# Patient Record
Sex: FEMALE | Race: WHITE | HISPANIC OR LATINO | Employment: FULL TIME | URBAN - METROPOLITAN AREA
[De-identification: names, ages, dates, MRNs, and addresses within clinical notes are randomized per-mention and may not be internally consistent; named-entity substitution may affect disease eponyms.]

---

## 2017-01-04 ENCOUNTER — AMBULATORY - HEALTHEAST (OUTPATIENT)
Dept: SURGERY | Facility: CLINIC | Age: 52
End: 2017-01-04

## 2017-01-13 ENCOUNTER — AMBULATORY - HEALTHEAST (OUTPATIENT)
Dept: SURGERY | Facility: CLINIC | Age: 52
End: 2017-01-13

## 2017-01-13 ENCOUNTER — OFFICE VISIT - HEALTHEAST (OUTPATIENT)
Dept: SURGERY | Facility: CLINIC | Age: 52
End: 2017-01-13

## 2017-01-13 DIAGNOSIS — E66.01 MORBID OBESITY (H): ICD-10-CM

## 2017-01-13 DIAGNOSIS — E78.5 HYPERLIPEMIA: ICD-10-CM

## 2017-01-13 DIAGNOSIS — I10 HYPERTENSION: ICD-10-CM

## 2017-01-13 ASSESSMENT — MIFFLIN-ST. JEOR: SCORE: 1732.03

## 2017-02-16 ENCOUNTER — AMBULATORY - HEALTHEAST (OUTPATIENT)
Dept: SURGERY | Facility: CLINIC | Age: 52
End: 2017-02-16

## 2017-02-23 ENCOUNTER — HOSPITAL ENCOUNTER (OUTPATIENT)
Dept: CARDIOLOGY | Facility: CLINIC | Age: 52
Discharge: HOME | End: 2017-02-23
Attending: FAMILY MEDICINE

## 2017-02-23 ENCOUNTER — AMBULATORY - HEALTHEAST (OUTPATIENT)
Dept: SURGERY | Facility: CLINIC | Age: 52
End: 2017-02-23

## 2017-02-23 ENCOUNTER — HOSPITAL ENCOUNTER (OUTPATIENT)
Dept: RADIOLOGY | Facility: CLINIC | Age: 52
Discharge: HOME | End: 2017-02-23
Attending: FAMILY MEDICINE

## 2017-02-23 DIAGNOSIS — Z01.818 PRE-OPERATIVE CLEARANCE: ICD-10-CM

## 2017-02-23 DIAGNOSIS — Z98.84 BARIATRIC SURGERY STATUS: ICD-10-CM

## 2017-02-23 DIAGNOSIS — E11.9 DIABETES (H): ICD-10-CM

## 2017-02-23 DIAGNOSIS — Z98.84 S/P LAPAROSCOPIC SLEEVE GASTRECTOMY: ICD-10-CM

## 2017-02-23 DIAGNOSIS — Z71.3 DIETARY COUNSELING: ICD-10-CM

## 2017-02-23 DIAGNOSIS — R63.4 RAPID WEIGHT LOSS: ICD-10-CM

## 2017-02-23 DIAGNOSIS — Z98.84 HISTORY OF ROUX-EN-Y GASTRIC BYPASS: ICD-10-CM

## 2017-02-23 DIAGNOSIS — K90.9 INTESTINAL MALABSORPTION: ICD-10-CM

## 2017-02-23 DIAGNOSIS — E66.9 OBESITY (BMI 30-39.9): ICD-10-CM

## 2017-02-23 ASSESSMENT — MIFFLIN-ST. JEOR: SCORE: 1727.5

## 2017-02-24 LAB
ATRIAL RATE - MUSE: 55 BPM
DIASTOLIC BLOOD PRESSURE - MUSE: NORMAL MMHG
INTERPRETATION ECG - MUSE: NORMAL
P AXIS - MUSE: 29 DEGREES
PR INTERVAL - MUSE: 152 MS
QRS DURATION - MUSE: 86 MS
QT - MUSE: 416 MS
QTC - MUSE: 397 MS
R AXIS - MUSE: 18 DEGREES
SYSTOLIC BLOOD PRESSURE - MUSE: NORMAL MMHG
T AXIS - MUSE: 16 DEGREES
VENTRICULAR RATE- MUSE: 55 BPM

## 2017-02-27 ENCOUNTER — OFFICE VISIT - HEALTHEAST (OUTPATIENT)
Dept: FAMILY MEDICINE | Facility: CLINIC | Age: 52
End: 2017-02-27

## 2017-02-27 DIAGNOSIS — H40.9 GLAUCOMA: ICD-10-CM

## 2017-02-27 DIAGNOSIS — E11.9 DIABETES (H): ICD-10-CM

## 2017-02-27 DIAGNOSIS — Z01.818 PREOP EXAMINATION: ICD-10-CM

## 2017-02-27 DIAGNOSIS — E55.9 VITAMIN D DEFICIENCY: ICD-10-CM

## 2017-02-27 DIAGNOSIS — J31.0 RHINITIS: ICD-10-CM

## 2017-02-27 DIAGNOSIS — E04.0 SIMPLE GOITER: ICD-10-CM

## 2017-02-27 DIAGNOSIS — E66.9 OBESITY: ICD-10-CM

## 2017-02-27 DIAGNOSIS — E78.5 HYPERLIPIDEMIA: ICD-10-CM

## 2017-02-27 DIAGNOSIS — K76.0 NAFLD (NONALCOHOLIC FATTY LIVER DISEASE): ICD-10-CM

## 2017-02-27 DIAGNOSIS — I10 ESSENTIAL HYPERTENSION: ICD-10-CM

## 2017-02-27 DIAGNOSIS — E11.9 TYPE 2 DIABETES MELLITUS (H): ICD-10-CM

## 2017-02-27 DIAGNOSIS — Z01.818 PRE-OPERATIVE CLEARANCE: ICD-10-CM

## 2017-02-27 LAB
CHOLEST SERPL-MCNC: 191 MG/DL
FASTING STATUS PATIENT QL REPORTED: ABNORMAL
HBA1C MFR BLD: 6.6 % (ref 3.5–6)
HDLC SERPL-MCNC: 56 MG/DL
LDLC SERPL CALC-MCNC: 99 MG/DL
TRIGL SERPL-MCNC: 182 MG/DL

## 2017-02-27 ASSESSMENT — MIFFLIN-ST. JEOR: SCORE: 1739.41

## 2017-02-28 ENCOUNTER — COMMUNICATION - HEALTHEAST (OUTPATIENT)
Dept: FAMILY MEDICINE | Facility: CLINIC | Age: 52
End: 2017-02-28

## 2017-03-12 ENCOUNTER — ANESTHESIA - HEALTHEAST (OUTPATIENT)
Dept: SURGERY | Facility: CLINIC | Age: 52
End: 2017-03-12

## 2017-03-13 ENCOUNTER — SURGERY - HEALTHEAST (OUTPATIENT)
Dept: SURGERY | Facility: CLINIC | Age: 52
End: 2017-03-13

## 2017-03-13 ASSESSMENT — MIFFLIN-ST. JEOR: SCORE: 1672.78

## 2017-03-16 ENCOUNTER — COMMUNICATION - HEALTHEAST (OUTPATIENT)
Dept: SURGERY | Facility: CLINIC | Age: 52
End: 2017-03-16

## 2017-03-21 ENCOUNTER — AMBULATORY - HEALTHEAST (OUTPATIENT)
Dept: SURGERY | Facility: CLINIC | Age: 52
End: 2017-03-21

## 2017-03-21 DIAGNOSIS — Z98.84 BARIATRIC SURGERY STATUS: ICD-10-CM

## 2017-03-21 DIAGNOSIS — Z71.3 DIETARY COUNSELING: ICD-10-CM

## 2017-03-21 DIAGNOSIS — E66.9 OBESITY (BMI 30-39.9): ICD-10-CM

## 2017-03-24 ENCOUNTER — COMMUNICATION - HEALTHEAST (OUTPATIENT)
Dept: INTERNAL MEDICINE | Facility: CLINIC | Age: 52
End: 2017-03-24

## 2017-03-27 ENCOUNTER — COMMUNICATION - HEALTHEAST (OUTPATIENT)
Dept: SURGERY | Facility: CLINIC | Age: 52
End: 2017-03-27

## 2017-03-29 ENCOUNTER — OFFICE VISIT - HEALTHEAST (OUTPATIENT)
Dept: SURGERY | Facility: CLINIC | Age: 52
End: 2017-03-29

## 2017-03-29 DIAGNOSIS — Z48.89 POSTOPERATIVE VISIT: ICD-10-CM

## 2017-03-29 DIAGNOSIS — E11.9 DIABETES (H): ICD-10-CM

## 2017-03-29 ASSESSMENT — MIFFLIN-ST. JEOR: SCORE: 1644.15

## 2017-05-05 ENCOUNTER — COMMUNICATION - HEALTHEAST (OUTPATIENT)
Dept: SURGERY | Facility: CLINIC | Age: 52
End: 2017-05-05

## 2017-06-08 ENCOUNTER — OFFICE VISIT - HEALTHEAST (OUTPATIENT)
Dept: FAMILY MEDICINE | Facility: CLINIC | Age: 52
End: 2017-06-08

## 2017-06-08 DIAGNOSIS — R42 LIGHTHEADED: ICD-10-CM

## 2017-06-08 DIAGNOSIS — I10 ESSENTIAL HYPERTENSION WITH GOAL BLOOD PRESSURE LESS THAN 130/80: ICD-10-CM

## 2017-06-08 DIAGNOSIS — R73.03 PREDIABETES: ICD-10-CM

## 2017-06-08 LAB — HBA1C MFR BLD: 5.5 % (ref 3.5–6)

## 2017-06-08 ASSESSMENT — MIFFLIN-ST. JEOR: SCORE: 1540.73

## 2017-06-12 ENCOUNTER — COMMUNICATION - HEALTHEAST (OUTPATIENT)
Dept: FAMILY MEDICINE | Facility: CLINIC | Age: 52
End: 2017-06-12

## 2017-06-19 ENCOUNTER — OFFICE VISIT - HEALTHEAST (OUTPATIENT)
Dept: SURGERY | Facility: CLINIC | Age: 52
End: 2017-06-19

## 2017-06-19 DIAGNOSIS — Z98.84 BARIATRIC SURGERY STATUS: ICD-10-CM

## 2017-06-19 DIAGNOSIS — Z71.3 DIETARY COUNSELING: ICD-10-CM

## 2017-06-19 DIAGNOSIS — E66.9 OBESITY (BMI 30-39.9): ICD-10-CM

## 2017-06-19 ASSESSMENT — MIFFLIN-ST. JEOR: SCORE: 1535.29

## 2017-06-22 ENCOUNTER — OFFICE VISIT - HEALTHEAST (OUTPATIENT)
Dept: FAMILY MEDICINE | Facility: CLINIC | Age: 52
End: 2017-06-22

## 2017-06-22 DIAGNOSIS — I10 ESSENTIAL HYPERTENSION WITH GOAL BLOOD PRESSURE LESS THAN 130/80: ICD-10-CM

## 2017-06-22 DIAGNOSIS — T75.3XXA MOTION SICKNESS, INITIAL ENCOUNTER: ICD-10-CM

## 2017-06-22 RX ORDER — CETIRIZINE HYDROCHLORIDE 5 MG/1
5 TABLET ORAL DAILY
Status: SHIPPED | COMMUNITY
Start: 2017-06-22

## 2017-06-22 ASSESSMENT — MIFFLIN-ST. JEOR: SCORE: 1515.78

## 2017-07-10 ENCOUNTER — OFFICE VISIT - HEALTHEAST (OUTPATIENT)
Dept: SURGERY | Facility: CLINIC | Age: 52
End: 2017-07-10

## 2017-07-10 DIAGNOSIS — E66.01 MORBID OBESITY (H): ICD-10-CM

## 2017-08-22 ENCOUNTER — COMMUNICATION - HEALTHEAST (OUTPATIENT)
Dept: FAMILY MEDICINE | Facility: CLINIC | Age: 52
End: 2017-08-22

## 2017-08-22 DIAGNOSIS — T75.3XXA MOTION SICKNESS, INITIAL ENCOUNTER: ICD-10-CM

## 2017-08-24 ENCOUNTER — RECORDS - HEALTHEAST (OUTPATIENT)
Dept: ADMINISTRATIVE | Facility: OTHER | Age: 52
End: 2017-08-24

## 2017-09-06 ENCOUNTER — AMBULATORY - HEALTHEAST (OUTPATIENT)
Dept: SURGERY | Facility: CLINIC | Age: 52
End: 2017-09-06

## 2017-09-06 DIAGNOSIS — K90.9 UNSPECIFIED INTESTINAL MALABSORPTION: ICD-10-CM

## 2017-09-06 DIAGNOSIS — Z98.84 S/P BARIATRIC SURGERY: ICD-10-CM

## 2017-09-06 DIAGNOSIS — K91.2 OTHER AND UNSPECIFIED POSTSURGICAL NONABSORPTION: ICD-10-CM

## 2017-09-11 ENCOUNTER — HOSPITAL ENCOUNTER (OUTPATIENT)
Dept: MAMMOGRAPHY | Facility: CLINIC | Age: 52
Discharge: HOME | End: 2017-09-11
Attending: OBSTETRICS & GYNECOLOGY

## 2017-09-11 DIAGNOSIS — Z12.31 VISIT FOR SCREENING MAMMOGRAM: ICD-10-CM

## 2017-09-15 ENCOUNTER — COMMUNICATION - HEALTHEAST (OUTPATIENT)
Dept: SURGERY | Facility: CLINIC | Age: 52
End: 2017-09-15

## 2017-09-18 ENCOUNTER — OFFICE VISIT - HEALTHEAST (OUTPATIENT)
Dept: FAMILY MEDICINE | Facility: CLINIC | Age: 52
End: 2017-09-18

## 2017-09-18 DIAGNOSIS — Z01.818 PREOP EXAMINATION: ICD-10-CM

## 2017-09-18 DIAGNOSIS — I34.0 MITRAL REGURGITATION: ICD-10-CM

## 2017-09-18 RX ORDER — CLINDAMYCIN HCL 300 MG
CAPSULE ORAL
Refills: 0 | Status: SHIPPED | COMMUNITY
Start: 2017-07-12 | End: 2021-12-16

## 2017-09-18 ASSESSMENT — MIFFLIN-ST. JEOR: SCORE: 1463.84

## 2017-09-19 ENCOUNTER — COMMUNICATION - HEALTHEAST (OUTPATIENT)
Dept: FAMILY MEDICINE | Facility: CLINIC | Age: 52
End: 2017-09-19

## 2017-09-22 ENCOUNTER — RECORDS - HEALTHEAST (OUTPATIENT)
Dept: ADMINISTRATIVE | Facility: OTHER | Age: 52
End: 2017-09-22

## 2017-09-22 LAB
LAB AP CHARGES (HE HISTORICAL CONVERSION): NORMAL
PATH REPORT.COMMENTS IMP SPEC: NORMAL
PATH REPORT.COMMENTS IMP SPEC: NORMAL
PATH REPORT.FINAL DX SPEC: NORMAL
PATH REPORT.GROSS SPEC: NORMAL
PATH REPORT.MICROSCOPIC SPEC OTHER STN: NORMAL
PATH REPORT.MICROSCOPIC SPEC OTHER STN: NORMAL
PATH REPORT.RELEVANT HX SPEC: NORMAL
RESULT FLAG (HE HISTORICAL CONVERSION): NORMAL

## 2017-10-04 ENCOUNTER — OFFICE VISIT - HEALTHEAST (OUTPATIENT)
Dept: INTERNAL MEDICINE | Facility: CLINIC | Age: 52
End: 2017-10-04

## 2017-10-04 DIAGNOSIS — R30.0 DYSURIA: ICD-10-CM

## 2017-10-06 ENCOUNTER — COMMUNICATION - HEALTHEAST (OUTPATIENT)
Dept: INTERNAL MEDICINE | Facility: CLINIC | Age: 52
End: 2017-10-06

## 2017-10-17 ENCOUNTER — OFFICE VISIT - HEALTHEAST (OUTPATIENT)
Dept: INTERNAL MEDICINE | Facility: CLINIC | Age: 52
End: 2017-10-17

## 2017-10-17 DIAGNOSIS — Z98.84 S/P BARIATRIC SURGERY: ICD-10-CM

## 2017-10-17 DIAGNOSIS — E78.5 HYPERLIPIDEMIA: ICD-10-CM

## 2017-10-17 DIAGNOSIS — R20.0 NUMBNESS AND TINGLING IN RIGHT HAND: ICD-10-CM

## 2017-10-17 DIAGNOSIS — K76.0 NAFLD (NONALCOHOLIC FATTY LIVER DISEASE): ICD-10-CM

## 2017-10-17 DIAGNOSIS — R20.2 NUMBNESS AND TINGLING IN RIGHT HAND: ICD-10-CM

## 2017-10-17 DIAGNOSIS — Z76.89 ENCOUNTER TO ESTABLISH CARE WITH NEW DOCTOR: ICD-10-CM

## 2017-10-17 DIAGNOSIS — K91.2 OTHER AND UNSPECIFIED POSTSURGICAL NONABSORPTION: ICD-10-CM

## 2017-10-17 DIAGNOSIS — E11.9 TYPE 2 DIABETES MELLITUS (H): ICD-10-CM

## 2017-10-17 DIAGNOSIS — E55.9 VITAMIN D DEFICIENCY: ICD-10-CM

## 2017-10-17 DIAGNOSIS — F43.21 GRIEF REACTION: ICD-10-CM

## 2017-10-17 DIAGNOSIS — Z98.890 S/P GASTRIC SURGERY: ICD-10-CM

## 2017-10-17 DIAGNOSIS — I10 ESSENTIAL HYPERTENSION: ICD-10-CM

## 2017-10-17 DIAGNOSIS — K90.9 INTESTINAL MALABSORPTION: ICD-10-CM

## 2017-10-17 LAB
CHOLEST SERPL-MCNC: 230 MG/DL
FASTING STATUS PATIENT QL REPORTED: ABNORMAL
HBA1C MFR BLD: 5.5 % (ref 3.5–6)
HDLC SERPL-MCNC: 59 MG/DL
LDLC SERPL CALC-MCNC: 135 MG/DL
TRIGL SERPL-MCNC: 181 MG/DL

## 2017-10-17 ASSESSMENT — MIFFLIN-ST. JEOR: SCORE: 1468.09

## 2017-10-23 ENCOUNTER — RECORDS - HEALTHEAST (OUTPATIENT)
Dept: ADMINISTRATIVE | Facility: OTHER | Age: 52
End: 2017-10-23

## 2017-10-24 ENCOUNTER — OFFICE VISIT - HEALTHEAST (OUTPATIENT)
Dept: SURGERY | Facility: CLINIC | Age: 52
End: 2017-10-24

## 2017-10-24 DIAGNOSIS — E66.3 OVERWEIGHT: ICD-10-CM

## 2017-10-24 DIAGNOSIS — K91.2 POSTOPERATIVE MALABSORPTION: ICD-10-CM

## 2017-10-24 RX ORDER — LATANOPROST 50 UG/ML
1 SOLUTION/ DROPS OPHTHALMIC AT BEDTIME
Refills: 11 | Status: SHIPPED | COMMUNITY
Start: 2017-10-11 | End: 2021-12-16

## 2017-10-24 ASSESSMENT — MIFFLIN-ST. JEOR: SCORE: 1471.21

## 2017-11-30 ENCOUNTER — COMMUNICATION - HEALTHEAST (OUTPATIENT)
Dept: INTERNAL MEDICINE | Facility: CLINIC | Age: 52
End: 2017-11-30

## 2017-11-30 DIAGNOSIS — J30.9 ALLERGIC RHINITIS: ICD-10-CM

## 2017-12-29 ENCOUNTER — COMMUNICATION - HEALTHEAST (OUTPATIENT)
Dept: INTERNAL MEDICINE | Facility: CLINIC | Age: 52
End: 2017-12-29

## 2017-12-29 DIAGNOSIS — J30.9 ALLERGIC RHINITIS: ICD-10-CM

## 2018-03-16 ENCOUNTER — OFFICE VISIT - HEALTHEAST (OUTPATIENT)
Dept: SURGERY | Facility: CLINIC | Age: 53
End: 2018-03-16

## 2018-03-16 DIAGNOSIS — Z98.84 BARIATRIC SURGERY STATUS: ICD-10-CM

## 2018-03-16 DIAGNOSIS — Z71.3 NUTRITIONAL COUNSELING: ICD-10-CM

## 2018-03-16 DIAGNOSIS — E66.3 OVERWEIGHT (BMI 25.0-29.9): ICD-10-CM

## 2018-03-16 ASSESSMENT — MIFFLIN-ST. JEOR: SCORE: 1467.25

## 2018-04-30 ENCOUNTER — COMMUNICATION - HEALTHEAST (OUTPATIENT)
Dept: INTERNAL MEDICINE | Facility: CLINIC | Age: 53
End: 2018-04-30

## 2018-04-30 DIAGNOSIS — J30.9 ALLERGIC RHINITIS: ICD-10-CM

## 2018-06-04 ENCOUNTER — AMBULATORY - HEALTHEAST (OUTPATIENT)
Dept: SURGERY | Facility: CLINIC | Age: 53
End: 2018-06-04

## 2018-06-04 DIAGNOSIS — K90.9 INTESTINAL MALABSORPTION, UNSPECIFIED TYPE: ICD-10-CM

## 2018-06-04 DIAGNOSIS — Z98.84 S/P BARIATRIC SURGERY: ICD-10-CM

## 2018-06-04 DIAGNOSIS — K91.2 POSTSURGICAL MALABSORPTION: ICD-10-CM

## 2018-06-10 ENCOUNTER — COMMUNICATION - HEALTHEAST (OUTPATIENT)
Dept: FAMILY MEDICINE | Facility: CLINIC | Age: 53
End: 2018-06-10

## 2018-06-10 DIAGNOSIS — I10 ESSENTIAL HYPERTENSION WITH GOAL BLOOD PRESSURE LESS THAN 130/80: ICD-10-CM

## 2018-06-12 ENCOUNTER — OFFICE VISIT - HEALTHEAST (OUTPATIENT)
Dept: INTERNAL MEDICINE | Facility: CLINIC | Age: 53
End: 2018-06-12

## 2018-06-12 DIAGNOSIS — E78.5 HYPERLIPIDEMIA: ICD-10-CM

## 2018-06-12 DIAGNOSIS — Z01.818 PREOP EXAMINATION: ICD-10-CM

## 2018-06-12 DIAGNOSIS — E11.9 TYPE 2 DIABETES MELLITUS (H): ICD-10-CM

## 2018-06-12 DIAGNOSIS — I10 ESSENTIAL HYPERTENSION: ICD-10-CM

## 2018-06-12 DIAGNOSIS — H40.9 GLAUCOMA: ICD-10-CM

## 2018-06-12 DIAGNOSIS — I34.0 MITRAL REGURGITATION: ICD-10-CM

## 2018-06-12 DIAGNOSIS — K76.0 NAFLD (NONALCOHOLIC FATTY LIVER DISEASE): ICD-10-CM

## 2018-06-12 DIAGNOSIS — R87.619 ABNORMAL PAP SMEAR OF CERVIX: ICD-10-CM

## 2018-06-12 DIAGNOSIS — Z01.818 ENCOUNTER FOR PREOPERATIVE EXAMINATION FOR GENERAL SURGICAL PROCEDURE: ICD-10-CM

## 2018-06-12 DIAGNOSIS — Z98.84 S/P LAPAROSCOPIC SLEEVE GASTRECTOMY: ICD-10-CM

## 2018-06-12 LAB
ALBUMIN SERPL-MCNC: 4.2 G/DL (ref 3.5–5)
ALP SERPL-CCNC: 89 U/L (ref 45–120)
ALT SERPL W P-5'-P-CCNC: 13 U/L (ref 0–45)
ANION GAP SERPL CALCULATED.3IONS-SCNC: 13 MMOL/L (ref 5–18)
AST SERPL W P-5'-P-CCNC: 23 U/L (ref 0–40)
BILIRUB SERPL-MCNC: 0.4 MG/DL (ref 0–1)
BUN SERPL-MCNC: 13 MG/DL (ref 8–22)
CALCIUM SERPL-MCNC: 9.8 MG/DL (ref 8.5–10.5)
CHLORIDE BLD-SCNC: 103 MMOL/L (ref 98–107)
CO2 SERPL-SCNC: 23 MMOL/L (ref 22–31)
CREAT SERPL-MCNC: 0.7 MG/DL (ref 0.6–1.1)
CREAT UR-MCNC: 26.9 MG/DL
ERYTHROCYTE [DISTWIDTH] IN BLOOD BY AUTOMATED COUNT: 11.3 % (ref 11–14.5)
GFR SERPL CREATININE-BSD FRML MDRD: >60 ML/MIN/1.73M2
GLUCOSE BLD-MCNC: 88 MG/DL (ref 70–125)
HBA1C MFR BLD: 5.4 % (ref 3.5–6)
HCT VFR BLD AUTO: 40.7 % (ref 35–47)
HGB BLD-MCNC: 13.4 G/DL (ref 12–16)
MCH RBC QN AUTO: 32.1 PG (ref 27–34)
MCHC RBC AUTO-ENTMCNC: 33.1 G/DL (ref 32–36)
MCV RBC AUTO: 97 FL (ref 80–100)
MICROALBUMIN UR-MCNC: <0.5 MG/DL (ref 0–1.99)
MICROALBUMIN/CREAT UR: NORMAL MG/G
PLATELET # BLD AUTO: 252 THOU/UL (ref 140–440)
PMV BLD AUTO: 7.8 FL (ref 7–10)
POTASSIUM BLD-SCNC: 4 MMOL/L (ref 3.5–5)
PROT SERPL-MCNC: 7.3 G/DL (ref 6–8)
RBC # BLD AUTO: 4.19 MILL/UL (ref 3.8–5.4)
SODIUM SERPL-SCNC: 139 MMOL/L (ref 136–145)
WBC: 8.9 THOU/UL (ref 4–11)

## 2018-06-12 ASSESSMENT — MIFFLIN-ST. JEOR: SCORE: 1493.56

## 2018-06-13 LAB
ATRIAL RATE - MUSE: 57 BPM
DIASTOLIC BLOOD PRESSURE - MUSE: NORMAL MMHG
INTERPRETATION ECG - MUSE: NORMAL
P AXIS - MUSE: 42 DEGREES
PR INTERVAL - MUSE: 158 MS
QRS DURATION - MUSE: 88 MS
QT - MUSE: 456 MS
QTC - MUSE: 443 MS
R AXIS - MUSE: 30 DEGREES
SYSTOLIC BLOOD PRESSURE - MUSE: NORMAL MMHG
T AXIS - MUSE: 35 DEGREES
VENTRICULAR RATE- MUSE: 57 BPM

## 2018-06-18 ENCOUNTER — ANESTHESIA - HEALTHEAST (OUTPATIENT)
Dept: SURGERY | Facility: CLINIC | Age: 53
End: 2018-06-18

## 2018-06-19 ENCOUNTER — SURGERY - HEALTHEAST (OUTPATIENT)
Dept: SURGERY | Facility: CLINIC | Age: 53
End: 2018-06-19

## 2018-07-24 ENCOUNTER — COMMUNICATION - HEALTHEAST (OUTPATIENT)
Dept: INTERNAL MEDICINE | Facility: CLINIC | Age: 53
End: 2018-07-24

## 2018-07-24 DIAGNOSIS — T75.3XXA MOTION SICKNESS, INITIAL ENCOUNTER: ICD-10-CM

## 2018-09-17 ENCOUNTER — HOSPITAL ENCOUNTER (OUTPATIENT)
Dept: MAMMOGRAPHY | Facility: CLINIC | Age: 53
Discharge: HOME | End: 2018-09-17
Attending: INTERNAL MEDICINE

## 2018-09-17 DIAGNOSIS — Z12.31 SCREENING MAMMOGRAM, ENCOUNTER FOR: ICD-10-CM

## 2018-11-27 ENCOUNTER — COMMUNICATION - HEALTHEAST (OUTPATIENT)
Dept: ADMINISTRATIVE | Facility: CLINIC | Age: 53
End: 2018-11-27

## 2018-12-19 ENCOUNTER — RECORDS - HEALTHEAST (OUTPATIENT)
Dept: ADMINISTRATIVE | Facility: OTHER | Age: 53
End: 2018-12-19

## 2019-01-30 ENCOUNTER — OFFICE VISIT - HEALTHEAST (OUTPATIENT)
Dept: INTERNAL MEDICINE | Facility: CLINIC | Age: 54
End: 2019-01-30

## 2019-01-30 DIAGNOSIS — E78.5 HYPERLIPIDEMIA, UNSPECIFIED HYPERLIPIDEMIA TYPE: ICD-10-CM

## 2019-01-30 DIAGNOSIS — E55.9 VITAMIN D DEFICIENCY: ICD-10-CM

## 2019-01-30 DIAGNOSIS — H40.9 GLAUCOMA, UNSPECIFIED GLAUCOMA TYPE, UNSPECIFIED LATERALITY: ICD-10-CM

## 2019-01-30 DIAGNOSIS — E04.0 SIMPLE GOITER: ICD-10-CM

## 2019-01-30 DIAGNOSIS — F43.23 ADJUSTMENT DISORDER WITH MIXED ANXIETY AND DEPRESSED MOOD: ICD-10-CM

## 2019-01-30 DIAGNOSIS — Z98.84 S/P LAPAROSCOPIC SLEEVE GASTRECTOMY: ICD-10-CM

## 2019-01-30 DIAGNOSIS — Z00.00 ROUTINE GENERAL MEDICAL EXAMINATION AT A HEALTH CARE FACILITY: ICD-10-CM

## 2019-01-30 DIAGNOSIS — I10 ESSENTIAL HYPERTENSION: ICD-10-CM

## 2019-01-30 DIAGNOSIS — E11.9 TYPE 2 DIABETES MELLITUS WITHOUT COMPLICATION, WITHOUT LONG-TERM CURRENT USE OF INSULIN (H): ICD-10-CM

## 2019-01-30 DIAGNOSIS — K76.0 NAFLD (NONALCOHOLIC FATTY LIVER DISEASE): ICD-10-CM

## 2019-01-30 LAB
ALBUMIN SERPL-MCNC: 4.3 G/DL (ref 3.5–5)
ALP SERPL-CCNC: 81 U/L (ref 45–120)
ALT SERPL W P-5'-P-CCNC: 27 U/L (ref 0–45)
ANION GAP SERPL CALCULATED.3IONS-SCNC: 15 MMOL/L (ref 5–18)
AST SERPL W P-5'-P-CCNC: 31 U/L (ref 0–40)
BILIRUB SERPL-MCNC: 0.7 MG/DL (ref 0–1)
BUN SERPL-MCNC: 13 MG/DL (ref 8–22)
CALCIUM SERPL-MCNC: 9.9 MG/DL (ref 8.5–10.5)
CHLORIDE BLD-SCNC: 103 MMOL/L (ref 98–107)
CHOLEST SERPL-MCNC: 272 MG/DL
CO2 SERPL-SCNC: 22 MMOL/L (ref 22–31)
CREAT SERPL-MCNC: 0.78 MG/DL (ref 0.6–1.1)
FASTING STATUS PATIENT QL REPORTED: YES
GFR SERPL CREATININE-BSD FRML MDRD: >60 ML/MIN/1.73M2
GLUCOSE BLD-MCNC: 102 MG/DL (ref 70–125)
HBA1C MFR BLD: 5.6 % (ref 3.5–6)
HDLC SERPL-MCNC: 84 MG/DL
LDLC SERPL CALC-MCNC: 143 MG/DL
POTASSIUM BLD-SCNC: 4.4 MMOL/L (ref 3.5–5)
PROT SERPL-MCNC: 7.7 G/DL (ref 6–8)
SODIUM SERPL-SCNC: 140 MMOL/L (ref 136–145)
TRIGL SERPL-MCNC: 227 MG/DL
TSH SERPL DL<=0.005 MIU/L-ACNC: 1.51 UIU/ML (ref 0.3–5)

## 2019-01-31 LAB
25(OH)D3 SERPL-MCNC: 52.4 NG/ML (ref 30–80)
25(OH)D3 SERPL-MCNC: 52.4 NG/ML (ref 30–80)

## 2019-02-18 ENCOUNTER — HOSPITAL ENCOUNTER (OUTPATIENT)
Dept: ULTRASOUND IMAGING | Facility: CLINIC | Age: 54
Discharge: HOME | End: 2019-02-18
Attending: INTERNAL MEDICINE

## 2019-02-18 DIAGNOSIS — E04.0 SIMPLE GOITER: ICD-10-CM

## 2019-02-22 ENCOUNTER — COMMUNICATION - HEALTHEAST (OUTPATIENT)
Dept: INTERNAL MEDICINE | Facility: CLINIC | Age: 54
End: 2019-02-22

## 2019-03-06 ENCOUNTER — COMMUNICATION - HEALTHEAST (OUTPATIENT)
Dept: INTERNAL MEDICINE | Facility: CLINIC | Age: 54
End: 2019-03-06

## 2019-03-06 DIAGNOSIS — E04.0 SIMPLE GOITER: ICD-10-CM

## 2019-03-07 ENCOUNTER — COMMUNICATION - HEALTHEAST (OUTPATIENT)
Dept: ADMINISTRATIVE | Facility: CLINIC | Age: 54
End: 2019-03-07

## 2019-06-11 ENCOUNTER — COMMUNICATION - HEALTHEAST (OUTPATIENT)
Dept: SURGERY | Facility: CLINIC | Age: 54
End: 2019-06-11

## 2019-06-11 ENCOUNTER — COMMUNICATION - HEALTHEAST (OUTPATIENT)
Dept: FAMILY MEDICINE | Facility: CLINIC | Age: 54
End: 2019-06-11

## 2019-06-11 DIAGNOSIS — I10 ESSENTIAL HYPERTENSION WITH GOAL BLOOD PRESSURE LESS THAN 130/80: ICD-10-CM

## 2019-07-22 ENCOUNTER — COMMUNICATION - HEALTHEAST (OUTPATIENT)
Dept: INTERNAL MEDICINE | Facility: CLINIC | Age: 54
End: 2019-07-22

## 2019-07-22 DIAGNOSIS — J30.9 ALLERGIC RHINITIS: ICD-10-CM

## 2019-08-02 ENCOUNTER — COMMUNICATION - HEALTHEAST (OUTPATIENT)
Dept: SCHEDULING | Facility: CLINIC | Age: 54
End: 2019-08-02

## 2019-09-05 ENCOUNTER — OFFICE VISIT - HEALTHEAST (OUTPATIENT)
Dept: FAMILY MEDICINE | Facility: CLINIC | Age: 54
End: 2019-09-05

## 2019-09-05 DIAGNOSIS — F10.10 ALCOHOL ABUSE: ICD-10-CM

## 2019-09-05 DIAGNOSIS — I10 ESSENTIAL HYPERTENSION WITH GOAL BLOOD PRESSURE LESS THAN 130/80: ICD-10-CM

## 2019-09-05 DIAGNOSIS — E66.01 MORBID OBESITY (H): ICD-10-CM

## 2019-09-05 DIAGNOSIS — E11.9 TYPE 2 DIABETES MELLITUS WITHOUT COMPLICATION, WITHOUT LONG-TERM CURRENT USE OF INSULIN (H): ICD-10-CM

## 2019-09-05 DIAGNOSIS — I34.0 MITRAL VALVE INSUFFICIENCY, UNSPECIFIED ETIOLOGY: ICD-10-CM

## 2019-09-05 DIAGNOSIS — Z76.89 ENCOUNTER TO ESTABLISH CARE: ICD-10-CM

## 2019-09-05 DIAGNOSIS — E78.5 HYPERLIPIDEMIA, UNSPECIFIED HYPERLIPIDEMIA TYPE: ICD-10-CM

## 2019-09-05 LAB
ANION GAP SERPL CALCULATED.3IONS-SCNC: 10 MMOL/L (ref 5–18)
BUN SERPL-MCNC: 17 MG/DL (ref 8–22)
CALCIUM SERPL-MCNC: 10.1 MG/DL (ref 8.5–10.5)
CHLORIDE BLD-SCNC: 104 MMOL/L (ref 98–107)
CO2 SERPL-SCNC: 25 MMOL/L (ref 22–31)
CREAT SERPL-MCNC: 0.81 MG/DL (ref 0.6–1.1)
GFR SERPL CREATININE-BSD FRML MDRD: >60 ML/MIN/1.73M2
GLUCOSE BLD-MCNC: 106 MG/DL (ref 70–125)
HBA1C MFR BLD: 5.4 % (ref 3.5–6)
POTASSIUM BLD-SCNC: 4 MMOL/L (ref 3.5–5)
SODIUM SERPL-SCNC: 139 MMOL/L (ref 136–145)

## 2019-09-05 ASSESSMENT — ANXIETY QUESTIONNAIRES
GAD7 TOTAL SCORE: 13
IF YOU CHECKED OFF ANY PROBLEMS ON THIS QUESTIONNAIRE, HOW DIFFICULT HAVE THESE PROBLEMS MADE IT FOR YOU TO DO YOUR WORK, TAKE CARE OF THINGS AT HOME, OR GET ALONG WITH OTHER PEOPLE: SOMEWHAT DIFFICULT
7. FEELING AFRAID AS IF SOMETHING AWFUL MIGHT HAPPEN: SEVERAL DAYS
5. BEING SO RESTLESS THAT IT IS HARD TO SIT STILL: NOT AT ALL
4. TROUBLE RELAXING: NEARLY EVERY DAY
3. WORRYING TOO MUCH ABOUT DIFFERENT THINGS: NEARLY EVERY DAY
6. BECOMING EASILY ANNOYED OR IRRITABLE: SEVERAL DAYS
1. FEELING NERVOUS, ANXIOUS, OR ON EDGE: NEARLY EVERY DAY
2. NOT BEING ABLE TO STOP OR CONTROL WORRYING: MORE THAN HALF THE DAYS

## 2019-09-05 ASSESSMENT — PATIENT HEALTH QUESTIONNAIRE - PHQ9: SUM OF ALL RESPONSES TO PHQ QUESTIONS 1-9: 8

## 2019-09-05 ASSESSMENT — MIFFLIN-ST. JEOR: SCORE: 1613.53

## 2019-09-06 ENCOUNTER — COMMUNICATION - HEALTHEAST (OUTPATIENT)
Dept: FAMILY MEDICINE | Facility: CLINIC | Age: 54
End: 2019-09-06

## 2019-09-27 ENCOUNTER — RECORDS - HEALTHEAST (OUTPATIENT)
Dept: HEALTH INFORMATION MANAGEMENT | Facility: CLINIC | Age: 54
End: 2019-09-27

## 2019-10-20 ENCOUNTER — COMMUNICATION - HEALTHEAST (OUTPATIENT)
Dept: INTERNAL MEDICINE | Facility: CLINIC | Age: 54
End: 2019-10-20

## 2019-10-20 DIAGNOSIS — J30.9 ALLERGIC RHINITIS: ICD-10-CM

## 2019-10-23 ENCOUNTER — HOSPITAL ENCOUNTER (OUTPATIENT)
Dept: MAMMOGRAPHY | Facility: CLINIC | Age: 54
Discharge: HOME | End: 2019-10-23

## 2019-10-23 DIAGNOSIS — Z12.31 VISIT FOR SCREENING MAMMOGRAM: ICD-10-CM

## 2019-12-03 ENCOUNTER — COMMUNICATION - HEALTHEAST (OUTPATIENT)
Dept: FAMILY MEDICINE | Facility: CLINIC | Age: 54
End: 2019-12-03

## 2019-12-03 DIAGNOSIS — I10 ESSENTIAL HYPERTENSION WITH GOAL BLOOD PRESSURE LESS THAN 130/80: ICD-10-CM

## 2020-06-10 ENCOUNTER — COMMUNICATION - HEALTHEAST (OUTPATIENT)
Dept: FAMILY MEDICINE | Facility: CLINIC | Age: 55
End: 2020-06-10

## 2020-06-11 ENCOUNTER — OFFICE VISIT - HEALTHEAST (OUTPATIENT)
Dept: FAMILY MEDICINE | Facility: CLINIC | Age: 55
End: 2020-06-11

## 2020-06-11 DIAGNOSIS — T75.3XXA MOTION SICKNESS, INITIAL ENCOUNTER: ICD-10-CM

## 2020-08-01 ENCOUNTER — VIRTUAL VISIT (OUTPATIENT)
Dept: FAMILY MEDICINE | Facility: OTHER | Age: 55
End: 2020-08-01
Payer: COMMERCIAL

## 2020-08-02 ENCOUNTER — COMMUNICATION - HEALTHEAST (OUTPATIENT)
Dept: FAMILY MEDICINE | Facility: CLINIC | Age: 55
End: 2020-08-02

## 2020-08-02 DIAGNOSIS — E78.5 HYPERLIPIDEMIA, UNSPECIFIED HYPERLIPIDEMIA TYPE: ICD-10-CM

## 2020-08-02 DIAGNOSIS — I10 ESSENTIAL HYPERTENSION: ICD-10-CM

## 2020-08-02 DIAGNOSIS — E11.9 TYPE 2 DIABETES MELLITUS WITHOUT COMPLICATION, WITHOUT LONG-TERM CURRENT USE OF INSULIN (H): ICD-10-CM

## 2020-08-02 NOTE — PROGRESS NOTES
"Date: 2020 07:56:07  Clinician: Jignesh Asif  Clinician NPI: [id number]  Patient: Evita Vasquez  Patient : 1965  Patient Address: [place] [address]  Patient Phone: [phone]  Visit Protocol: URI  Patient Summary:  Evita is a 54 year old ( : 1965 ) female who initiated a Visit for COVID-19 (Coronavirus) evaluation and screening. When asked the question \"Please sign me up to receive news, health information and promotions from [employer].\", Evita responded \"Yes\".    When asked when her symptoms started, Evita reported that she does not have any symptoms.   She denies having recent facial or sinus surgery in the past 60 days and taking antibiotic medication in the past month.    Pertinent COVID-19 (Coronavirus) information  In the past 14 days, Evita has not worked in a congregate living setting.   She does not work or volunteer as healthcare worker or a  and does not work or volunteer in a healthcare facility.   Evita also has not lived in a congregate living setting in the past 14 days. She does not live with a healthcare worker.   Evita has not had a close contact with a laboratory-confirmed COVID-19 patient within the last 14 days.   Pertinent medical history  Evita does not get yeast infections when she takes antibiotics.   Evita does not need a return to work/school note.   Weight: 220 lbs   Evita does not smoke or use smokeless tobacco.   Additional information as reported by the patient (free text): My  spend four days with his father in [place]. His father tested positive Yesterday. He was in close contact with him.  He has been home 10 days. I have a apt with my doctor Monday, and I am wondering if I should be tested first. I am unsure if the symptoms I have are allergy related.   Weight: 220 lbs    MEDICATIONS: lisinopril oral, ALLERGIES: Penicillins  Clinician Response:  Dear Evita,   Based on the details you've shared, you may have been exposed " to coronavirus (COVID-19). You do not need to be tested at this time.  What should I do?  For safety, it's very important to follow these rules. Do this for 14 days after the date you were last exposed to the virus.  Stay home and away from others. Don't go to work, school or anywhere else.  No hugging, kissing or shaking hands.  Don't let anyone visit.  Cover your mouth and nose with a mask, tissue or washcloth to avoid spreading germs.  Wash your hands and face often. Use soap and water.  What are the symptoms of COVID-19?  The most common symptoms are cough, fever and trouble breathing. Less common symptoms include headache, body aches, fatigue (feeling very tired), chills, sore throat, stuffy or runny nose, diarrhea (loose poop), loss of taste or smell, belly pain, and nausea or vomiting (feeling sick to your stomach or throwing up).  After 14 days, if you have still don't have symptoms, you likely don't have this virus.  If you develop symptoms, follow these guidelines.  If you're normally healthy: Please start another [facility] visit to report your symptoms. Go to [email or web address].  If you have a serious health problem (like cancer, heart failure, an organ transplant or kidney disease): Call your specialty clinic. Let them know that you might have COVID-19.  Where can I get more information?   [facility] -- About COVID-19: [email or web address]  CDC -- What to Do If You're Sick: www.cdc.gov/coronavirus/2019-ncov/about/steps-when-sick.html  CDC -- Ending Home Isolation: www.cdc.gov/coronavirus/2019-ncov/hcp/disposition-in-home-patients.html  CDC -- Caring for Someone: www.cdc.gov/coronavirus/2019-ncov/if-you-are-sick/care-for-someone.html  [facility] -- Interim Guidance for Hospital Discharge to Home: www.health.[facility].mn.us/diseases/coronavirus/hcp/hospdischarge.pdf  [facility] clinical trials (COVID-19 research studies): clinicalaffairs.[facility]-clinical-trials  Below are the COVID-19  hotlines at the [place] Department of Health ([facility]). Interpreters are available.   For health questions: Call 167-938-3193 or 1-314.962.2869 (7 a.m. to 7 p.m.) For questions about schools and childcare: Call 732-729-0394 or 1-322.353.4748 (7 a.m. to 7 p.m.)     .      Diagnosis: Healthy person accompanying sick person  Diagnosis ICD: Z76.3

## 2020-08-03 ENCOUNTER — COMMUNICATION - HEALTHEAST (OUTPATIENT)
Dept: FAMILY MEDICINE | Facility: CLINIC | Age: 55
End: 2020-08-03

## 2020-08-12 ENCOUNTER — AMBULATORY - HEALTHEAST (OUTPATIENT)
Dept: LAB | Facility: CLINIC | Age: 55
End: 2020-08-12

## 2020-08-12 DIAGNOSIS — E11.9 TYPE 2 DIABETES MELLITUS WITHOUT COMPLICATION, WITHOUT LONG-TERM CURRENT USE OF INSULIN (H): ICD-10-CM

## 2020-08-12 DIAGNOSIS — E78.5 HYPERLIPIDEMIA, UNSPECIFIED HYPERLIPIDEMIA TYPE: ICD-10-CM

## 2020-08-12 DIAGNOSIS — I10 ESSENTIAL HYPERTENSION: ICD-10-CM

## 2020-08-12 LAB
ANION GAP SERPL CALCULATED.3IONS-SCNC: 10 MMOL/L (ref 5–18)
BUN SERPL-MCNC: 7 MG/DL (ref 8–22)
CALCIUM SERPL-MCNC: 9.4 MG/DL (ref 8.5–10.5)
CHLORIDE BLD-SCNC: 103 MMOL/L (ref 98–107)
CHOLEST SERPL-MCNC: 199 MG/DL
CO2 SERPL-SCNC: 26 MMOL/L (ref 22–31)
CREAT SERPL-MCNC: 0.66 MG/DL (ref 0.6–1.1)
CREAT UR-MCNC: 167.4 MG/DL
FASTING STATUS PATIENT QL REPORTED: YES
GFR SERPL CREATININE-BSD FRML MDRD: >60 ML/MIN/1.73M2
GLUCOSE BLD-MCNC: 153 MG/DL (ref 70–125)
HBA1C MFR BLD: 6.9 %
HDLC SERPL-MCNC: 66 MG/DL
LDLC SERPL CALC-MCNC: 102 MG/DL
MICROALBUMIN UR-MCNC: 3.14 MG/DL (ref 0–1.99)
MICROALBUMIN/CREAT UR: 18.8 MG/G
POTASSIUM BLD-SCNC: 4.6 MMOL/L (ref 3.5–5)
SODIUM SERPL-SCNC: 139 MMOL/L (ref 136–145)
TRIGL SERPL-MCNC: 157 MG/DL

## 2020-08-17 ENCOUNTER — COMMUNICATION - HEALTHEAST (OUTPATIENT)
Dept: FAMILY MEDICINE | Facility: CLINIC | Age: 55
End: 2020-08-17

## 2020-08-18 ENCOUNTER — AMBULATORY - HEALTHEAST (OUTPATIENT)
Dept: FAMILY MEDICINE | Facility: CLINIC | Age: 55
End: 2020-08-18

## 2020-08-18 DIAGNOSIS — E11.9 TYPE 2 DIABETES MELLITUS WITHOUT COMPLICATION, WITHOUT LONG-TERM CURRENT USE OF INSULIN (H): ICD-10-CM

## 2020-08-19 ENCOUNTER — AMBULATORY - HEALTHEAST (OUTPATIENT)
Dept: FAMILY MEDICINE | Facility: CLINIC | Age: 55
End: 2020-08-19

## 2020-08-19 DIAGNOSIS — E11.9 TYPE 2 DIABETES MELLITUS WITHOUT COMPLICATION, WITHOUT LONG-TERM CURRENT USE OF INSULIN (H): ICD-10-CM

## 2020-10-29 ENCOUNTER — VIRTUAL VISIT (OUTPATIENT)
Dept: FAMILY MEDICINE | Facility: OTHER | Age: 55
End: 2020-10-29

## 2020-10-29 ENCOUNTER — COMMUNICATION - HEALTHEAST (OUTPATIENT)
Dept: FAMILY MEDICINE | Facility: CLINIC | Age: 55
End: 2020-10-29

## 2020-10-29 ENCOUNTER — COMMUNICATION - HEALTHEAST (OUTPATIENT)
Dept: SCHEDULING | Facility: CLINIC | Age: 55
End: 2020-10-29

## 2020-10-29 NOTE — PROGRESS NOTES
"Date: 10/29/2020 08:50:05  Clinician: Yuliana Sunshine  Clinician NPI: [id number]  Patient: Evita Vasquez  Patient : 1965  Patient Address: [facility] [place] [address]  Patient Phone: [phone]  Visit Protocol: URI  Patient Summary:  Evita is a 55 year old ( : 1965 ) female who initiated a [facility] Visit for COVID-19 (Coronavirus) evaluation and screening. When asked the question \"Please sign me up to receive news, health information and promotions from [facility].\", Evita responded \"Yes\".    Evita states her symptoms started 1-2 days ago.   Her symptoms consist of a headache, wheezing, enlarged lymph nodes, a cough, nasal congestion, malaise, a sore throat, tooth pain, ageusia, anosmia, and rhinitis. She is experiencing mild difficulty breathing with activities but can speak normally in full sentences.   Symptom details     Nasal secretions: The color of her mucus is yellow and clear.    Cough: Evita coughs almost every minute and her cough is not more bothersome at night. Phlegm does not come into her throat when she coughs. She believes her cough is caused by post-nasal drip.     Sore throat: Evita reports having mild throat pain (1-3 on a 10 point pain scale), does not have exudate on her tonsils, and can swallow liquids. The lymph nodes in her neck are enlarged. A rash has not appeared on the skin since the sore throat started.     Wheezing: Evita has not ever been diagnosed with asthma. Additional wheezing details as reported by the patient (free text): Just started this morning.  It just feels difficult to breathe.  Not too weesy after taking medicine.       Headache: She states the headache is mild (1-3 on a 10 point pain scale).     Tooth pain: The tooth pain is not caused by a cavity, recent dental work, or other mouth problems.      Evita denies having ear pain, fever, nausea, facial pain or pressure, myalgias, chills, diarrhea, and vomiting. She also denies having recent facial or " sinus surgery in the past 60 days, having a sinus infection within the past year, and taking antibiotic medication in the past month.   Precipitating events  Evita is not sure if she has been exposed to someone with strep throat. She has recently been exposed to someone with influenza. Evita has been in close contact with the following high risk individuals: immunocompromised people and people with asthma, heart disease or diabetes.   Pertinent COVID-19 (Coronavirus) information  Evita does not work or volunteer as healthcare worker or a . In the past 14 days, Evita has not worked or volunteered at a healthcare facility or group living setting.   In the past 14 days, she also has not lived in a congregate living setting.   Evita has not had a close contact with a laboratory-confirmed COVID-19 patient within 14 days of symptom onset.    Since December 2019, Evita has not been diagnosed with lab-confirmed COVID-19 test and has not had upper respiratory infection or influenza-like illness.   Pertinent medical history  Evita does not get yeast infections when she takes antibiotics.   Evita does not need a return to work/school note.   Weight: 220 lbs   Evita does not smoke or use smokeless tobacco.   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 I do not know if I have been exposed to the flu or other viruses.  However, students have been sick and sent home.  No one COVID positive to my knowledge.   Weight: 220 lbs    MEDICATIONS: vitamin A-vitamin D3-vitamin E-vitamin K oral, vitamin B12-folic acid oral, Daily Multiple Vitamins with Iron oral, lisinopril oral, ALLERGIES: Penicillins  Clinician Response:  Dear Evita,   Your symptoms show that you may have coronavirus (COVID-19). This illness can cause fever, cough and trouble breathing. Many people get a mild case and get better on their own. Some people can get very sick.  Based  "on the symptoms you have shared, I would like you to be re-checked in 2 to 3 days. Please call your family clinic to set up a video or phone visit.  Will I be tested for COVID-19?  We would like to test you for this virus.   Please call [phone] to schedule your visit. Explain that [facility]. Be ready to share your [facility] visit ID number.    The following will serve as your written order for this COVID Test, ordered by me, for the indication of suspected COVID [Z20.828]: The test will be ordered in [employer], our electronic health record, after you are scheduled. It will show as ordered and authorized by Demar Adkins MD.  Order: COVID-19 (Coronavirus) PCR for SYMPTOMATIC testing from [facility].   1.When it's time for your COVID test:   Stay at least 6 feet away from others. (If someone will drive you to your test, stay in the backseat, as far away from the  as you can.)   Cover your mouth and nose with a mask, tissue or washcloth.  Go straight to the testing site. Don't make any stops on the way there or back.      2.Starting now: Stay home and away from others (self-isolate) until:   You've had no fever---and no medicine that reduces fever---for one full day (24 hours). And...   Your other symptoms have gotten better. For example, your cough or breathing has improved. And...   At least 10 days have passed since your symptoms started.       During this time, don't leave the house except for testing or medical care.   Stay in your own room, even for meals. Use your own bathroom if you can.   Stay away from others in your home. No hugging, kissing or shaking hands. No visitors.  Don't go to work, school or anywhere else.    Clean \"high touch\" surfaces often (doorknobs, counters, handles, etc.). Use a household cleaning spray or wipes. You'll find a full list of  on the EPA website: www.epa.gov/pesticide-registration/list-n-disinfectants-use-against-sars-cov-2.   Cover " your mouth and nose with a mask, tissue or washcloth to avoid spreading germs.  Wash your hands and face often. Use soap and water.  Caregivers in these groups are at risk for severe illness due to COVID-19:  o People 65 years and older  o People who live in a nursing home or long-term care facility  o People with chronic disease (lung, heart, cancer, diabetes, kidney, liver, immunologic)   o People who have a weakened immune system, including those who:   Are in cancer treatment  Take medicine that weakens the immune system, such as corticosteroids  Had a bone marrow or organ transplant  Have an immune deficiency  Have poorly controlled HIV or AIDS  Are obese (body mass index of 40 or higher)  Smoke regularly   o Caregivers should wear gloves while washing dishes, handling laundry and cleaning bedrooms and bathrooms.  o Use caution when washing and drying laundry: Don't shake dirty laundry, and use the warmest water setting that you can.  o For more tips, go to www.cdc.gov/coronavirus/2019-ncov/downloads/10Things.pdf.      How can I take care of myself?   Get lots of rest. Drink extra fluids (unless a doctor has told you not to)   Take Tylenol (acetaminophen) for fever or pain. If you have liver or kidney problems, ask your family doctor if it's okay to take Tylenol.   Adults can take either:    650 mg (two 325 mg pills) every 4 to 6 hours, or...   1,000 mg (two 500 mg pills) every 8 hours as needed.    Note: Don't take more than 3,000 mg in one day. Acetaminophen is found in many medicines (both prescribed and over-the-counter medicines). Read all labels to be sure you don't take too much.   For children, check the Tylenol bottle for the right dose. The dose is based on the child's age or weight.    If you have other health problems (like cancer, heart failure, an organ transplant or severe kidney disease): Call your specialty clinic if you don't feel better in the next 2 days.       Know when to call 911.  Emergency warning signs include:    Trouble breathing or shortness of breath Pain or pressure in the chest that doesn't go away Feeling confused like you haven't felt before, or not being able to wake up Bluish-colored lips or face  Where can I get more information?   [facility] -- About COVID-19: [email or web address]   CDC -- What to Do If You're Sick: www.cdc.gov/coronavirus/2019-ncov/about/steps-when-sick.html   CDC -- Ending Home Isolation: www.cdc.gov/coronavirus/2019-ncov/hcp/disposition-in-home-patients.html   [facility] -- Caring for Someone: www.cdc.gov/coronavirus/2019-ncov/if-you-are-sick/care-for-someone.html   [facility] -- Interim Guidance for Hospital Discharge to Home: www.health.[facility].mn./diseases/coronavirus/hcp/hospdischarge.pdf   [facility] clinical trials (COVID-19 research studies): clinicalaffairs.[facility]-clinical-trials    Below are the COVID-19 hotlines at the [place] Department of Health ([facility]). Interpreters are available.    For health questions: Call 864-102-1076 or 1-971.725.3547 (7 a.m. to 7 p.m.) For questions about schools and childcare: Call 898-020-2416 or 1-217.439.7546 (7 a.m. to 7 p.m.)       Diagnosis: Contact with and (suspected) exposure to other viral communicable diseases  Diagnosis ICD: Z20.828

## 2020-10-30 ENCOUNTER — AMBULATORY - HEALTHEAST (OUTPATIENT)
Dept: FAMILY MEDICINE | Facility: CLINIC | Age: 55
End: 2020-10-30

## 2020-10-30 DIAGNOSIS — Z20.822 SUSPECTED 2019 NOVEL CORONAVIRUS INFECTION: ICD-10-CM

## 2020-10-30 DIAGNOSIS — Z20.822 COVID-19 RULED OUT: ICD-10-CM

## 2020-11-22 ENCOUNTER — COMMUNICATION - HEALTHEAST (OUTPATIENT)
Dept: FAMILY MEDICINE | Facility: CLINIC | Age: 55
End: 2020-11-22

## 2020-11-22 DIAGNOSIS — E11.9 TYPE 2 DIABETES MELLITUS WITHOUT COMPLICATION, WITHOUT LONG-TERM CURRENT USE OF INSULIN (H): ICD-10-CM

## 2020-11-25 ENCOUNTER — VIRTUAL VISIT (OUTPATIENT)
Dept: FAMILY MEDICINE | Facility: OTHER | Age: 55
End: 2020-11-25

## 2020-11-25 ENCOUNTER — AMBULATORY - HEALTHEAST (OUTPATIENT)
Dept: FAMILY MEDICINE | Facility: CLINIC | Age: 55
End: 2020-11-25

## 2020-11-25 DIAGNOSIS — Z20.822 SUSPECTED 2019 NOVEL CORONAVIRUS INFECTION: ICD-10-CM

## 2020-11-25 NOTE — PROGRESS NOTES
"Date: 2020 13:46:00  Clinician: Kim Lay  Clinician NPI: [id number]  Patient: Evita Vasquez  Patient : 1965  Patient Address: [facility] [place] [address]  Patient Phone: [phone]  Visit Protocol: URI  Patient Summary:  Evita is a 55 year old ( : 1965 ) female who initiated a [facility] Visit for COVID-19 (Coronavirus) evaluation and screening. When asked the question \"Please sign me up to receive news, health information and promotions from [facility].\", Evita responded \"Yes\".    Evita states her symptoms started 1-2 days ago.   Her symptoms consist of a cough, nasal congestion, nausea, rhinitis, myalgia, chills, malaise, ageusia, diarrhea, and anosmia. She is experiencing mild difficulty breathing with activities but can speak normally in full sentences. Evita also feels feverish.   Symptom details     Nasal secretions: The color of her mucus is blood-tinged, white, and clear.    Cough: Evita coughs a few times an hour and her cough is not more bothersome at night. Phlegm comes into her throat when she coughs. She believes her cough is caused by post-nasal drip. The color of the phlegm is white and clear.     Temperature: Her current temperature is 100.4 degrees Fahrenheit. Evita has had a temperature over 100 degrees Fahrenheit for 1-2 days.      Evita denies having ear pain, headache, wheezing, vomiting, facial pain or pressure, sore throat, and teeth pain. She also denies taking antibiotic medication in the past month and having recent facial or sinus surgery in the past 60 days.   Precipitating events  She has recently been exposed to someone with influenza. Evita has been in close contact with the following high risk individuals: people with asthma, heart disease or diabetes.   Pertinent COVID-19 (Coronavirus) information  Evita does not work or volunteer as healthcare worker or a . In the past 14 days, Evita has not worked or volunteered at a healthcare " facility or group living setting.   In the past 14 days, she also has not lived in a congregate living setting.   Evita has had a close contact with a laboratory-confirmed COVID-19 patient within 14 days of symptom onset. She was exposed at her work. Date Evita was exposed to the laboratory-confirmed COVID-19 patient: 11/17/2020   Additional information about contact with COVID-19 (Coronavirus) patient as reported by the patient (free text): On November 16 &amp; 17 spent about 3 hours with my  talking about transitioning to distant learning etc.  On November 18 he woke up sick and had a test.  He tested positive.    Since December 2019, Evita has been tested for COVID-19 and has had upper respiratory infection (URI) or influenza-like illness.      Result of COVID-19 test: Negative     Date of her COVID-19 test: 10/22/2020     Date(s) of previous URI or influenza-like illness (free-text): 10/19/2020 10/25/2020     Symptoms Evita experienced during previous URI or influenza-like illness as reported by the patient (free-text): Headache, runny nose, cough, phlegm, chest pain        Pertinent medical history  Evita has diabetes. She has been told by her provider that her diabetes is not in control.   She has been told by her provider to avoid NSAIDs.   Evita does not get yeast infections when she takes antibiotics.   She denies having immunosuppressive conditions (e.g., chemotherapy, HIV, organ transplant, long-term use of steroids or other immunosuppressive medications, splenectomy). She does not have severe COPD and congestive heart failure. She does not have asthma.   Evita needs a return to work/school note.   Weight: 220 lbs   Evita does not smoke or use smokeless tobacco.   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  I  spent several hours a day in my classroom with some of these students.   Weight: 220 lbs    MEDICATIONS: vitamin A-vitamin D3-vitamin E-vitamin K oral, vitamin B12-folic acid oral, Daily Multiple Vitamins with Iron oral, lisinopril oral, ALLERGIES: Penicillins  Clinician Response:  Dear Evita,   Your symptoms show that you may have coronavirus (COVID-19). This illness can cause fever, cough and trouble breathing. Many people get a mild case and get better on their own. Some people can get very sick.  Based on the symptoms you have shared, I would like you to be re-checked in 2 to 3 days. Please call your family clinic to set up a video or phone visit.  Will I be tested for COVID-19?  We would like to test you for this virus.   Please call [phone] to schedule your visit. Explain that you were referred by [facility] to have a COVID-19 test. Be ready to share your [facility] visit ID number.   * If you need to schedule in [place] or [facility] please call [phone] or for [employer] employees please call [phone].    The following will serve as your written order for this COVID Test, ordered by me, for the indication of suspected COVID [Z20.828]: The test will be ordered in [employer], our electronic health record, after you are scheduled. It will show as ordered and authorized by Demar Adkins MD.  Order: COVID-19 (Coronavirus) PCR for SYMPTOMATIC testing from [facility].   1.When it's time for your COVID test:   Stay at least 6 feet away from others. (If someone will drive you to your test, stay in the backseat, as far away from the  as you can.)   Cover your mouth and nose with a mask, tissue or washcloth.  Go straight to the testing site. Don't make any stops on the way there or back.      2.Starting now: Stay home and away from others (self-isolate) until:   You've had no fever---and no medicine that reduces fever---for one full day (24 hours). And...   Your other symptoms have gotten better. For example, your  "cough or breathing has improved. And...   At least 10 days have passed since your symptoms started.       During this time, don't leave the house except for testing or medical care.   Stay in your own room, even for meals. Use your own bathroom if you can.   Stay away from others in your home. No hugging, kissing or shaking hands. No visitors.  Don't go to work, school or anywhere else.    Clean \"high touch\" surfaces often (doorknobs, counters, handles, etc.). Use a household cleaning spray or wipes. You'll find a full list of  on the EPA website: www.epa.gov/pesticide-registration/list-n-disinfectants-use-against-sars-cov-2.   Cover your mouth and nose with a mask, tissue or washcloth to avoid spreading germs.  Wash your hands and face often. Use soap and water.  Caregivers in these groups are at risk for severe illness due to COVID-19:  o People 65 years and older  o People who live in a nursing home or long-term care facility  o People with chronic disease (lung, heart, cancer, diabetes, kidney, liver, immunologic)   o People who have a weakened immune system, including those who:   Are in cancer treatment  Take medicine that weakens the immune system, such as corticosteroids  Had a bone marrow or organ transplant  Have an immune deficiency  Have poorly controlled HIV or AIDS  Are obese (body mass index of 40 or higher)  Smoke regularly   o Caregivers should wear gloves while washing dishes, handling laundry and cleaning bedrooms and bathrooms.  o Use caution when washing and drying laundry: Don't shake dirty laundry, and use the warmest water setting that you can.  o For more tips, go to www.cdc.gov/coronavirus/2019-ncov/downloads/10Things.pdf.      How can I take care of myself?   Get lots of rest. Drink extra fluids (unless a doctor has told you not to)   Take Tylenol (acetaminophen) for fever or pain. If you have liver or kidney problems, ask your family doctor if it's okay to take Tylenol.   Adults " can take either:    650 mg (two 325 mg pills) every 4 to 6 hours, or...   1,000 mg (two 500 mg pills) every 8 hours as needed.    Note: Don't take more than 3,000 mg in one day. Acetaminophen is found in many medicines (both prescribed and over-the-counter medicines). Read all labels to be sure you don't take too much.   For children, check the Tylenol bottle for the right dose. The dose is based on the child's age or weight.    If you have other health problems (like cancer, heart failure, an organ transplant or severe kidney disease): Call your specialty clinic if you don't feel better in the next 2 days.       Know when to call 911. Emergency warning signs include:    Trouble breathing or shortness of breath Pain or pressure in the chest that doesn't go away Feeling confused like you haven't felt before, or not being able to wake up Bluish-colored lips or face  Where can I get more information?   [facility] -- About COVID-19: [email or web address]   CDC -- What to Do If You're Sick: www.cdc.gov/coronavirus/2019-ncov/about/steps-when-sick.html   CDC -- Ending Home Isolation: www.cdc.gov/coronavirus/2019-ncov/hcp/disposition-in-home-patients.html   CDC -- Caring for Someone: www.cdc.gov/coronavirus/2019-ncov/if-you-are-sick/care-for-someone.html   [facility] -- Interim Guidance for Hospital Discharge to Home: www.health.[facility].[place].us/diseases/coronavirus/hcp/hospdischarge.pdf   [facility] clinical trials (COVID-19 research studies): clinicalaffairs.[facility]-clinical-trials    Below are the COVID-19 hotlines at the [facility] ([facility]). Interpreters are available.    For health questions: Call 817-458-0350 or 1-935.825.8869 (7 a.m. to 7 p.m.) For questions about schools and childcare: Call 708-866-1529 or 1-961.846.1192 (7 a.m. to 7 p.m.)       Diagnosis: Contact with and (suspected) exposure to other viral communicable diseases  Diagnosis ICD: Z20.828

## 2020-11-27 ENCOUNTER — AMBULATORY - HEALTHEAST (OUTPATIENT)
Dept: FAMILY MEDICINE | Facility: CLINIC | Age: 55
End: 2020-11-27

## 2020-11-27 DIAGNOSIS — Z20.822 SUSPECTED 2019 NOVEL CORONAVIRUS INFECTION: ICD-10-CM

## 2020-11-29 ENCOUNTER — COMMUNICATION - HEALTHEAST (OUTPATIENT)
Dept: SCHEDULING | Facility: CLINIC | Age: 55
End: 2020-11-29

## 2020-12-16 ENCOUNTER — COMMUNICATION - HEALTHEAST (OUTPATIENT)
Dept: FAMILY MEDICINE | Facility: CLINIC | Age: 55
End: 2020-12-16

## 2020-12-16 DIAGNOSIS — I10 ESSENTIAL HYPERTENSION WITH GOAL BLOOD PRESSURE LESS THAN 130/80: ICD-10-CM

## 2020-12-21 ENCOUNTER — HOSPITAL ENCOUNTER (OUTPATIENT)
Dept: MAMMOGRAPHY | Facility: CLINIC | Age: 55
Discharge: HOME | End: 2020-12-21

## 2020-12-21 DIAGNOSIS — Z12.31 SCREENING MAMMOGRAM, ENCOUNTER FOR: ICD-10-CM

## 2020-12-29 ENCOUNTER — COMMUNICATION - HEALTHEAST (OUTPATIENT)
Dept: FAMILY MEDICINE | Facility: CLINIC | Age: 55
End: 2020-12-29

## 2020-12-29 DIAGNOSIS — J30.9 ALLERGIC RHINITIS: ICD-10-CM

## 2021-01-07 ENCOUNTER — OFFICE VISIT - HEALTHEAST (OUTPATIENT)
Dept: FAMILY MEDICINE | Facility: CLINIC | Age: 56
End: 2021-01-07

## 2021-01-07 DIAGNOSIS — I10 ESSENTIAL HYPERTENSION WITH GOAL BLOOD PRESSURE LESS THAN 130/80: ICD-10-CM

## 2021-01-07 DIAGNOSIS — F10.10 ALCOHOL ABUSE: ICD-10-CM

## 2021-01-07 DIAGNOSIS — E78.5 HYPERLIPIDEMIA, UNSPECIFIED HYPERLIPIDEMIA TYPE: ICD-10-CM

## 2021-01-07 DIAGNOSIS — Z12.11 SCREEN FOR COLON CANCER: ICD-10-CM

## 2021-01-07 DIAGNOSIS — Z00.00 ROUTINE HEALTH MAINTENANCE: ICD-10-CM

## 2021-01-07 DIAGNOSIS — H40.9 GLAUCOMA, UNSPECIFIED GLAUCOMA TYPE, UNSPECIFIED LATERALITY: ICD-10-CM

## 2021-01-07 DIAGNOSIS — E11.9 TYPE 2 DIABETES MELLITUS WITHOUT COMPLICATION, WITHOUT LONG-TERM CURRENT USE OF INSULIN (H): ICD-10-CM

## 2021-01-07 DIAGNOSIS — E66.01 MORBID OBESITY (H): ICD-10-CM

## 2021-01-07 LAB
ALBUMIN SERPL-MCNC: 4.1 G/DL (ref 3.5–5)
ALP SERPL-CCNC: 128 U/L (ref 45–120)
ALT SERPL W P-5'-P-CCNC: 336 U/L (ref 0–45)
ANION GAP SERPL CALCULATED.3IONS-SCNC: 14 MMOL/L (ref 5–18)
AST SERPL W P-5'-P-CCNC: 721 U/L (ref 0–40)
BILIRUB SERPL-MCNC: 0.6 MG/DL (ref 0–1)
BUN SERPL-MCNC: 10 MG/DL (ref 8–22)
CALCIUM SERPL-MCNC: 10 MG/DL (ref 8.5–10.5)
CHLORIDE BLD-SCNC: 101 MMOL/L (ref 98–107)
CHOLEST SERPL-MCNC: 242 MG/DL
CO2 SERPL-SCNC: 24 MMOL/L (ref 22–31)
CREAT SERPL-MCNC: 0.71 MG/DL (ref 0.6–1.1)
FASTING STATUS PATIENT QL REPORTED: YES
GFR SERPL CREATININE-BSD FRML MDRD: >60 ML/MIN/1.73M2
GLUCOSE BLD-MCNC: 135 MG/DL (ref 70–125)
HBA1C MFR BLD: 6.8 %
HDLC SERPL-MCNC: 53 MG/DL
LDLC SERPL CALC-MCNC: 160 MG/DL
POTASSIUM BLD-SCNC: 4 MMOL/L (ref 3.5–5)
PROT SERPL-MCNC: 7.7 G/DL (ref 6–8)
SODIUM SERPL-SCNC: 139 MMOL/L (ref 136–145)
TRIGL SERPL-MCNC: 143 MG/DL

## 2021-01-07 RX ORDER — LISINOPRIL 40 MG/1
40 TABLET ORAL DAILY
Qty: 90 TABLET | Refills: 3 | Status: SHIPPED | OUTPATIENT
Start: 2021-01-07 | End: 2022-02-08

## 2021-01-07 ASSESSMENT — MIFFLIN-ST. JEOR: SCORE: 1586.54

## 2021-01-08 LAB — HCV AB SERPL QL IA: NEGATIVE

## 2021-01-28 ENCOUNTER — RECORDS - HEALTHEAST (OUTPATIENT)
Dept: ADMINISTRATIVE | Facility: OTHER | Age: 56
End: 2021-01-28

## 2021-04-01 ENCOUNTER — OFFICE VISIT - HEALTHEAST (OUTPATIENT)
Dept: FAMILY MEDICINE | Facility: CLINIC | Age: 56
End: 2021-04-01

## 2021-04-01 DIAGNOSIS — F10.10 ALCOHOL ABUSE: ICD-10-CM

## 2021-04-01 DIAGNOSIS — L30.9 DERMATITIS: ICD-10-CM

## 2021-04-01 DIAGNOSIS — E11.9 TYPE 2 DIABETES MELLITUS WITHOUT COMPLICATION, WITHOUT LONG-TERM CURRENT USE OF INSULIN (H): ICD-10-CM

## 2021-04-01 DIAGNOSIS — R79.89 ELEVATED LFTS: ICD-10-CM

## 2021-04-01 LAB
ALBUMIN SERPL-MCNC: 4.1 G/DL (ref 3.5–5)
ALP SERPL-CCNC: 112 U/L (ref 45–120)
ALT SERPL W P-5'-P-CCNC: 80 U/L (ref 0–45)
ANION GAP SERPL CALCULATED.3IONS-SCNC: 12 MMOL/L (ref 5–18)
AST SERPL W P-5'-P-CCNC: 141 U/L (ref 0–40)
BILIRUB SERPL-MCNC: 0.7 MG/DL (ref 0–1)
BUN SERPL-MCNC: 10 MG/DL (ref 8–22)
CALCIUM SERPL-MCNC: 9.7 MG/DL (ref 8.5–10.5)
CHLORIDE BLD-SCNC: 103 MMOL/L (ref 98–107)
CO2 SERPL-SCNC: 25 MMOL/L (ref 22–31)
CREAT SERPL-MCNC: 0.71 MG/DL (ref 0.6–1.1)
GFR SERPL CREATININE-BSD FRML MDRD: >60 ML/MIN/1.73M2
GLUCOSE BLD-MCNC: 145 MG/DL (ref 70–125)
HBA1C MFR BLD: 6.2 %
POTASSIUM BLD-SCNC: 4 MMOL/L (ref 3.5–5)
PROT SERPL-MCNC: 7.3 G/DL (ref 6–8)
SODIUM SERPL-SCNC: 140 MMOL/L (ref 136–145)

## 2021-04-01 RX ORDER — TRIAMCINOLONE ACETONIDE 1 MG/G
CREAM TOPICAL
Qty: 30 G | Refills: 0 | Status: SHIPPED | OUTPATIENT
Start: 2021-04-01 | End: 2021-12-16

## 2021-05-12 ENCOUNTER — COMMUNICATION - HEALTHEAST (OUTPATIENT)
Dept: SCHEDULING | Facility: CLINIC | Age: 56
End: 2021-05-12

## 2021-05-12 ENCOUNTER — OFFICE VISIT - HEALTHEAST (OUTPATIENT)
Dept: FAMILY MEDICINE | Facility: CLINIC | Age: 56
End: 2021-05-12

## 2021-05-12 ENCOUNTER — HOSPITAL ENCOUNTER (EMERGENCY)
Dept: EMERGENCY MEDICINE | Facility: CLINIC | Age: 56
Discharge: HOME | End: 2021-05-12
Payer: COMMERCIAL

## 2021-05-12 DIAGNOSIS — J06.9 UPPER RESPIRATORY TRACT INFECTION, UNSPECIFIED TYPE: ICD-10-CM

## 2021-05-12 DIAGNOSIS — R06.2 WHEEZING: ICD-10-CM

## 2021-05-12 DIAGNOSIS — R05.9 COUGH: ICD-10-CM

## 2021-05-12 DIAGNOSIS — J02.9 SORE THROAT: ICD-10-CM

## 2021-05-12 DIAGNOSIS — R06.02 SHORTNESS OF BREATH: ICD-10-CM

## 2021-05-12 DIAGNOSIS — Z20.822 SUSPECTED COVID-19 VIRUS INFECTION: ICD-10-CM

## 2021-05-12 LAB
ANION GAP SERPL CALCULATED.3IONS-SCNC: 7 MMOL/L (ref 5–18)
BASOPHILS # BLD AUTO: 0.1 THOU/UL (ref 0–0.2)
BASOPHILS NFR BLD AUTO: 1 % (ref 0–2)
BUN SERPL-MCNC: 7 MG/DL (ref 8–22)
CALCIUM SERPL-MCNC: 10.4 MG/DL (ref 8.5–10.5)
CHLORIDE BLD-SCNC: 106 MMOL/L (ref 98–107)
CO2 SERPL-SCNC: 26 MMOL/L (ref 22–31)
CREAT SERPL-MCNC: 0.66 MG/DL (ref 0.6–1.1)
EOSINOPHIL # BLD AUTO: 0.6 THOU/UL (ref 0–0.4)
EOSINOPHIL NFR BLD AUTO: 6 % (ref 0–6)
ERYTHROCYTE [DISTWIDTH] IN BLOOD BY AUTOMATED COUNT: 12.9 % (ref 11–14.5)
GFR SERPL CREATININE-BSD FRML MDRD: >60 ML/MIN/1.73M2
GLUCOSE BLD-MCNC: 128 MG/DL (ref 70–125)
HCT VFR BLD AUTO: 41 % (ref 35–47)
HGB BLD-MCNC: 14.5 G/DL (ref 12–16)
IMM GRANULOCYTES # BLD: 0 THOU/UL
IMM GRANULOCYTES NFR BLD: 0 %
LYMPHOCYTES # BLD AUTO: 1.6 THOU/UL (ref 0.8–4.4)
LYMPHOCYTES NFR BLD AUTO: 16 % (ref 20–40)
MCH RBC QN AUTO: 33.3 PG (ref 27–34)
MCHC RBC AUTO-ENTMCNC: 35.4 G/DL (ref 32–36)
MCV RBC AUTO: 94 FL (ref 80–100)
MONOCYTES # BLD AUTO: 0.7 THOU/UL (ref 0–0.9)
MONOCYTES NFR BLD AUTO: 7 % (ref 2–10)
NEUTROPHILS # BLD AUTO: 7 THOU/UL (ref 2–7.7)
NEUTROPHILS NFR BLD AUTO: 71 % (ref 50–70)
PLATELET # BLD AUTO: 227 THOU/UL (ref 140–440)
PMV BLD AUTO: 10.1 FL (ref 8.5–12.5)
POTASSIUM BLD-SCNC: 4 MMOL/L (ref 3.5–5)
RBC # BLD AUTO: 4.35 MILL/UL (ref 3.8–5.4)
SODIUM SERPL-SCNC: 139 MMOL/L (ref 136–145)
WBC: 9.9 THOU/UL (ref 4–11)

## 2021-05-12 RX ORDER — ALBUTEROL SULFATE 90 UG/1
2 AEROSOL, METERED RESPIRATORY (INHALATION) EVERY 4 HOURS PRN
Qty: 1 INHALER | Refills: 0 | Status: SHIPPED | OUTPATIENT
Start: 2021-05-12 | End: 2021-12-16

## 2021-05-12 ASSESSMENT — MIFFLIN-ST. JEOR: SCORE: 1603.33

## 2021-05-26 ASSESSMENT — PATIENT HEALTH QUESTIONNAIRE - PHQ9: SUM OF ALL RESPONSES TO PHQ QUESTIONS 1-9: 8

## 2021-05-27 VITALS — WEIGHT: 214 LBS | BODY MASS INDEX: 33.59 KG/M2 | HEIGHT: 67 IN

## 2021-05-28 ASSESSMENT — ANXIETY QUESTIONNAIRES: GAD7 TOTAL SCORE: 13

## 2021-05-29 NOTE — TELEPHONE ENCOUNTER
Former patient of carmen Ryan & has not established care with another provider.  Please assign refill request to covering provider per Clinic standard process.      Refill Approved    Rx renewed per Medication Renewal Policy. Medication was last renewed on 6/12/18.    Nadya Malik, [facility] Triage/Med Refill 6/12/2019     Requested Prescriptions   Pending Prescriptions Disp Refills     lisinopril (PRINIVIL,ZESTRIL) 10 MG tablet [Pharmacy Med Name: LISINOPRIL 10MG TABLETS] 90 tablet 0     Sig: TAKE 1 TABLET(10 MG) BY MOUTH DAILY       Ace Inhibitors Refill Protocol Passed - 6/11/2019  3:09 AM        Passed - PCP or prescribing provider visit in past 12 months       Last office visit with prescriber/PCP: 10/17/2017 Carmen Ryan MD OR same dept: Visit date not found OR same specialty: 6/22/2017 Noemí Walter, FNP  Last physical: 1/30/2019 Last MTM visit: Visit date not found   Next visit within 3 mo: Visit date not found  Next physical within 3 mo: Visit date not found  Prescriber OR PCP: Carmen Ryan MD  Last diagnosis associated with med order: 1. Essential hypertension with goal blood pressure less than 130/80  - lisinopril (PRINIVIL,ZESTRIL) 10 MG tablet [Pharmacy Med Name: LISINOPRIL 10MG TABLETS]; TAKE 1 TABLET(10 MG) BY MOUTH DAILY  Dispense: 90 tablet; Refill: 0    If protocol passes may refill for 12 months if within 3 months of last provider visit (or a total of 15 months).             Passed - Serum Potassium in past 12 months     Lab Results   Component Value Date    Potassium 4.4 01/30/2019             Passed - Blood pressure filed in past 12 months     BP Readings from Last 1 Encounters:   01/30/19 130/74             Passed - Serum Creatinine in past 12 months     Creatinine   Date Value Ref Range Status   01/30/2019 0.78 0.60 - 1.10 mg/dL Final

## 2021-05-29 NOTE — TELEPHONE ENCOUNTER
"----- Message from Dorothy Millan MD sent at 2019 10:53 AM CDT -----  Regarding: FW: Cancellation of Order # [id number]  It looks like she is due for yearly labs and these have been canceled  ----- Message -----  From: Admin, Epic  Sent: 2019  11:02 PM  To: Dorothy Millan MD  Subject: Cancellation of Order # [id number]                 Order number [id number] for the procedure VITAMIN A (RETINOL),   SERUM OR PLASMA (VIT A) [[id number]] has been canceled by Admin, Epic  [ADMIN]. This procedure was ordered by Dorothy Millan MD   [[id number]] on 2018 for the patient Evita Vasquez [[id number]].  The reason for cancellation was \"Order \".    This was a future order.    "

## 2021-05-29 NOTE — TELEPHONE ENCOUNTER
Called pt to get her scheduled for her annual post op visit as she missed her 2 yr appointment which should have been in March.  She needs to have labs ordered as well as an appt with one of the bariatricians.  Left pt a message asking her to call back to make the appt.    Chika Valadez RN, N  [facility]  P [phone]  F [phone]

## 2021-05-30 VITALS — HEIGHT: 66 IN | WEIGHT: 244 LBS | BODY MASS INDEX: 39.21 KG/M2

## 2021-05-30 VITALS — BODY MASS INDEX: 38.3 KG/M2 | HEIGHT: 67 IN | WEIGHT: 244 LBS

## 2021-05-30 VITALS — HEIGHT: 66 IN | WEIGHT: 245 LBS | BODY MASS INDEX: 39.37 KG/M2

## 2021-05-30 VITALS — HEIGHT: 67 IN | WEIGHT: 200.2 LBS | BODY MASS INDEX: 31.42 KG/M2

## 2021-05-30 VITALS — BODY MASS INDEX: 35.99 KG/M2 | HEIGHT: 67 IN | WEIGHT: 229.31 LBS

## 2021-05-30 VITALS — BODY MASS INDEX: 35 KG/M2 | WEIGHT: 223 LBS | HEIGHT: 67 IN

## 2021-05-30 VITALS — BODY MASS INDEX: 39.09 KG/M2 | WEIGHT: 244 LBS

## 2021-05-30 NOTE — TELEPHONE ENCOUNTER
Left voicemail for patient to return call to clinic. When patient returns call, please give them below message.    Tamika Wolfe CMA ............... 9:32 AM, 07/23/19

## 2021-05-30 NOTE — TELEPHONE ENCOUNTER
Former patient of carmen Ryan & has not established care with another provider.  Please assign refill request to covering provider per Clinic standard process.      Refill Approved    Rx renewed per Medication Renewal Policy. Medication was last renewed on 4/30//18.    Nadya Malik, [facility] Connection Triage/Med Refill 7/23/2019     Requested Prescriptions   Pending Prescriptions Disp Refills     fluticasone propionate (FLONASE) 50 mcg/actuation nasal spray [Pharmacy Med Name: FLUTICASONE 50MCG NASAL SP (120) RX]  0     Sig: SHAKE AND INSTILL 2 SPRAYS IN EACH NOSTRIL EVERY DAY AS NEEDED       Nasal Steroid Refill Protocol Passed - 7/22/2019  5:04 PM        Passed - Patient has had office visit/physical in last 2 years     Last office visit with prescriber/PCP: 10/17/2017 OR same dept: Visit date not found OR same specialty: 10/17/2017 Carmen Ryan MD Last physical: 1/30/2019 Last MTM visit: Visit date not found    Next appt within 3 mo: Visit date not found  Next physical within 3 mo: Visit date not found  Prescriber OR PCP: Carmen Ryan MD  Last diagnosis associated with med order: 1. Allergic rhinitis  - fluticasone propionate (FLONASE) 50 mcg/actuation nasal spray [Pharmacy Med Name: FLUTICASONE 50MCG NASAL SP (120) RX]; SHAKE AND INSTILL 2 SPRAYS IN EACH NOSTRIL EVERY DAY AS NEEDED; Refill: 0     If protocol passes may refill for 12 months if within 3 months of last provider visit (or a total of 15 months).

## 2021-05-30 NOTE — TELEPHONE ENCOUNTER
Patient needs to EC with a new clinician now that Dr. Ryan is no longer here. May be able to get a refill once scheduled.  Tamika Wolfe CMA ............... 9:24 AM, 07/23/19

## 2021-05-30 NOTE — TELEPHONE ENCOUNTER
Patient Returning Call  Reason for call:  Patient calling back  Information relayed to patient:  Relayed below message, patient agrees  Patient has additional questions:  No  If YES, what are your questions/concerns:  Transferred patient to scheduling to make an appointment.  Okay to leave a detailed message?: No call back needed

## 2021-05-31 VITALS — HEIGHT: 67 IN | WEIGHT: 184 LBS | BODY MASS INDEX: 28.88 KG/M2

## 2021-05-31 VITALS — HEIGHT: 67 IN | BODY MASS INDEX: 28.77 KG/M2 | WEIGHT: 183.31 LBS

## 2021-05-31 VITALS — BODY MASS INDEX: 30.56 KG/M2 | HEIGHT: 67 IN | WEIGHT: 194.7 LBS

## 2021-05-31 VITALS — WEIGHT: 185.5 LBS | BODY MASS INDEX: 29.49 KG/M2

## 2021-05-31 VITALS — WEIGHT: 199 LBS | HEIGHT: 67 IN | BODY MASS INDEX: 31.23 KG/M2

## 2021-05-31 VITALS — BODY MASS INDEX: 29.03 KG/M2 | WEIGHT: 185 LBS | HEIGHT: 67 IN

## 2021-05-31 NOTE — TELEPHONE ENCOUNTER
RN triage   Call from pt   Pt states she woke in the night - felt chest pressure and /104 -- took extra BP med   This AM = /98 -- still having chest pressure   Pt states she has gained a lot of wt and is drinking too much alcohol for the past 2 months   Has chest pressure off and on for the past few months   Pt states she walks Qday -- walked for 1 hr yesterday -- felt OK   No blurred vision or slurred speech  Per protocol = should go to ED or UC   Pt indicated she would go to    Shyanne Park RN [facility] RN triage      Reason for Disposition    Systolic BP >= 160 OR Diastolic >= 100, and any cardiac or neurologic symptoms (e.g., chest pain, difficulty breathing, unsteady gait, blurred vision)    Protocols used: HIGH BLOOD PRESSURE-A-OH

## 2021-06-01 VITALS — BODY MASS INDEX: 29.79 KG/M2 | HEIGHT: 67 IN | WEIGHT: 189.8 LBS

## 2021-06-01 VITALS — HEIGHT: 67 IN | WEIGHT: 184 LBS | BODY MASS INDEX: 28.88 KG/M2

## 2021-06-01 VITALS — BODY MASS INDEX: 30.23 KG/M2 | WEIGHT: 193 LBS

## 2021-06-01 NOTE — PROGRESS NOTES
Assessment/Plan:     1. Encounter to establish care    2. Essential hypertension with goal blood pressure less than 130/80  Optimal control today.  Continue Lisinopril at 40 mg daily.  I would not recommend any higher dose.  She will continue checking BPs at home with goal of < 140/90.  Recheck BMP.  - lisinopril (PRINIVIL,ZESTRIL) 40 MG tablet; Take 1 tablet (40 mg total) by mouth daily.  Dispense: 90 tablet; Refill: 0  - Basic Metabolic Panel    3. Morbid obesity (H)  S/P gastric sleeve.  Recent weight gain secondary to stress.      4. Alcohol abuse  Increased alcohol consumption secondary to stressors.  Patient recognizes that this is an issue.  Likely substituting food with alcohol since her gastric bypass.  Highly encouraged decreased alcohol use.  She will re-establish with her therapist, and I highly encouraged her to do so.  She declines any additional referrals today.    5. Hyperlipidemia, unspecified hyperlipidemia type  Encouraged better diet and exercise habits.  Will recheck a lipid panel next year at her physical.    6. Mitral valve insufficiency, unspecified etiology  No murmur noted today.  Consider an monitoring echocardiogram next year.    7. Type 2 diabetes mellitus without complication, without long-term current use of insulin (H)  Historically well controlled with diet.  Recent weight gain, will recheck a Hgb A1c.  - Glycosylated Hemoglobin A1c        Subjective:     Evita Vasquez is a 54 y.o. female who presents to establish care.  Patient previously followed with Dr. Carmen Ryan and internal medicine.  She works as a high school social sciences teacher.  She is also finishing her masters degree, and will graduate in December.  Patient is  with one adult daughter and 1 grandchild.    History of gastric sleeve.  She has recently gained some weight over the last year.  She mostly attributes this to stress and poor eating habits.  She has a lot on her plate with teaching as well as going to  school.  She was seeing a counselor last year, and found that to be very beneficial.  Dr. Ryan had started her on Lexapro earlier this year due to anxiety, but patient never filled this.  She has been self-medicating with alcohol.  And reports to drinking about 4 glasses of wine per day.  This usually occurs in the evening before bedtime.  She does not usually drink during the day or feel the need to drink when she wakes up.  She is concerned about her increased use of alcohol.  Denies any depressive symptoms.    Patient is no longer having any menstrual periods.  She had a hysterectomy 2 years ago due to significant cervical dysplasia.  Her ovaries are intact.  She follows with Dr. Lozano.    History of hypertension.  Patient historically on lisinopril 10 mg daily.  She was seen in the emergency room last month with some chest pain.  She was found to be quite hypertensive at 175/82.  Her lisinopril was increased to 20 mg at that visit.  Her EKG and troponin was negative for any concern about MI.  Since that visit, patient has been titrating her lisinopril up.  She is currently taking 50 mg daily.  Blood pressures have been well controlled at home, generally 120s over 70s.  She denies any chest pain or shortness of breath.  Her liver enzymes are very mildly elevated in the ER.    History of glaucoma.  She follows with an ophthalmologist for this.    History of mitral regurgitation.  She has had an echocardiogram in the past, but it has been quite a while.  She does take prophylactic antibiotics prior to dental procedures due to this.  She denies any new chest pain, shortness of breath, or palpitations.    History of type 2 diabetes prior to her gastric bypass.  Since then, it has been well controlled with diet and exercise.  Last hemoglobin A1c was normal.      The following portions of the patient's history were reviewed and updated as appropriate: allergies, current medications, past family history, past  "medical history, past social history, past surgical history and problem list.    Review of Systems  A comprehensive review of systems was performed and was otherwise negative    Objective:     /80   Pulse 64   Ht 5' 6.5\" (1.689 m)   Wt 218 lb (98.9 kg)   LMP 06/07/2018   BMI 34.66 kg/m      General Appearance: Alert, cooperative, no distress, appears stated age  Lungs: Clear to auscultation bilaterally, respirations unlabored  Heart: Regular rate and rhythm, S1 and S2 normal, no murmur, rub, or gallop   Psychologic: appropriate affective, answers all of my questions appropriately. No hallucinations, delusion, or suicidal ideations.    PHQ-9 Total Score: 8 (9/5/2019  3:00 PM)    TYRONE 7 Total Score: 13 (9/5/2019  3:00 PM)        Mary Kate Duncan NP    "

## 2021-06-02 VITALS — WEIGHT: 204.25 LBS | BODY MASS INDEX: 31.99 KG/M2

## 2021-06-02 NOTE — TELEPHONE ENCOUNTER
Refill Approved    Rx renewed per Medication Renewal Policy. Medication was last renewed on 7/23/19  OV 9/5/19.    Azucena Villalpando, [facility] Connection Triage/Med Refill 10/20/2019     Requested Prescriptions   Pending Prescriptions Disp Refills     fluticasone propionate (FLONASE) 50 mcg/actuation nasal spray [Pharmacy Med Name: FLUTICASONE 50MCG NASAL SP (120) RX]  0     Sig: SHAKE AND INSTILL 2 SPRAYS IN EACH NOSTRIL EVERY DAY AS NEEDED       Nasal Steroid Refill Protocol Passed - 10/20/2019  3:04 PM        Passed - Patient has had office visit/physical in last 2 years     Last office visit with prescriber/PCP: Visit date not found OR same dept: Visit date not found OR same specialty: 10/17/2017 Carmen Ryan MD Last physical: Visit date not found Last MTM visit: Visit date not found    Next appt within 3 mo: Visit date not found  Next physical within 3 mo: Visit date not found  Prescriber OR PCP: Dov Ryan MD  Last diagnosis associated with med order: 1. Allergic rhinitis  - fluticasone propionate (FLONASE) 50 mcg/actuation nasal spray [Pharmacy Med Name: FLUTICASONE 50MCG NASAL SP (120) RX]; SHAKE AND INSTILL 2 SPRAYS IN EACH NOSTRIL EVERY DAY AS NEEDED; Refill: 0     If protocol passes may refill for 12 months if within 3 months of last provider visit (or a total of 15 months).

## 2021-06-03 VITALS
HEIGHT: 67 IN | WEIGHT: 218 LBS | HEART RATE: 64 BPM | DIASTOLIC BLOOD PRESSURE: 80 MMHG | BODY MASS INDEX: 34.21 KG/M2 | SYSTOLIC BLOOD PRESSURE: 128 MMHG

## 2021-06-03 NOTE — TELEPHONE ENCOUNTER
Refill Approved    Rx renewed per Medication Renewal Policy. Medication was last renewed on 9/5/19.    Azucean Villalpando, [facility] Connection Triage/Med Refill 12/4/2019     Requested Prescriptions   Pending Prescriptions Disp Refills     lisinopril (PRINIVIL,ZESTRIL) 40 MG tablet [Pharmacy Med Name: LISINOPRIL 40MG TABLETS] 90 tablet 0     Sig: TAKE 1 TABLET(40 MG) BY MOUTH DAILY       Ace Inhibitors Refill Protocol Passed - 12/3/2019  5:06 PM        Passed - PCP or prescribing provider visit in past 12 months       Last office visit with prescriber/PCP: 9/5/2019 MaryK ate Duncan NP OR same dept: 9/5/2019 Mary Kate Duncan NP OR same specialty: 9/5/2019 Mary Kate Duncan NP  Last physical: Visit date not found Last MTM visit: Visit date not found   Next visit within 3 mo: Visit date not found  Next physical within 3 mo: Visit date not found  Prescriber OR PCP: Mary Kate Duncan NP  Last diagnosis associated with med order: 1. Essential hypertension with goal blood pressure less than 130/80  - lisinopril (PRINIVIL,ZESTRIL) 40 MG tablet [Pharmacy Med Name: LISINOPRIL 40MG TABLETS]; TAKE 1 TABLET(40 MG) BY MOUTH DAILY  Dispense: 90 tablet; Refill: 0    If protocol passes may refill for 12 months if within 3 months of last provider visit (or a total of 15 months).             Passed - Serum Potassium in past 12 months     Lab Results   Component Value Date    Potassium 4.0 09/05/2019             Passed - Blood pressure filed in past 12 months     BP Readings from Last 1 Encounters:   09/05/19 128/80             Passed - Serum Creatinine in past 12 months     Creatinine   Date Value Ref Range Status   09/05/2019 0.81 0.60 - 1.10 mg/dL Final

## 2021-06-04 VITALS — WEIGHT: 215 LBS | BODY MASS INDEX: 34.18 KG/M2

## 2021-06-05 VITALS
HEART RATE: 74 BPM | BODY MASS INDEX: 33.19 KG/M2 | DIASTOLIC BLOOD PRESSURE: 80 MMHG | SYSTOLIC BLOOD PRESSURE: 132 MMHG | WEIGHT: 211.9 LBS

## 2021-06-05 VITALS
SYSTOLIC BLOOD PRESSURE: 120 MMHG | WEIGHT: 210.3 LBS | HEART RATE: 74 BPM | DIASTOLIC BLOOD PRESSURE: 70 MMHG | HEIGHT: 67 IN | BODY MASS INDEX: 33.01 KG/M2

## 2021-06-08 NOTE — PROGRESS NOTES
"I was consulted by Bhupendra Woodward MD to evaluate this pt for Bariatric Surgery.    HPI: Evita Vasquez is a 51 y.o. female here today for consideration of metabolic and bariatric surgery. she has had weight problems for she was a teenager. she Has tried multiple weight loss programs in the past with moderate success. She has tried weight watchers, slim genics and nutrisystem. She was able to lose 64 lbs, but was unable to keep the weight off.   she carries most of his/her obesity in through their abdomen, and hips.   This pt also experiences Hypertention which is currently controlled with meds.   This pt does not have GERD.   The pt does not have Sleep Apnea.   This pt does not have diabetes.       Allergies:Penicillins    Past Medical History   Diagnosis Date     Borderline diabetes mellitus      Hyperlipidemia      Hypertension        No past surgical history on file.    CURRENT MEDS:      Family History   Problem Relation Age of Onset     Heart attack Father      Early death Father      Heart attack Brother      Diabetes Brother      Heart disease Brother      Cirrhosis Mother      Diabetes Mother      Type II     Hypertension Mother      No Medical Problems Sister      Diabetes Brother      Heart disease Brother      Diabetes Brother      Heart disease Brother         reports that she has quit smoking. Her smoking use included Cigarettes. She has never used smokeless tobacco. She reports that she drinks about 6.0 oz of alcohol per week  She reports that she does not use illicit drugs.    Review of Systems - 12 point Review of Systems is negative except for the issues mentioned above.    PSYCHIATRIC: she has undergone a lifestyle assessment and has been deemed a good candidate for bariatric surgery by the psychologist.    Visit Vitals     /84     Pulse 63     Ht 5' 6.25\" (1.683 m)     Wt (!) 245 lb (111.1 kg)     SpO2 98%     BMI 39.25 kg/m2     Body mass index is 39.25 kg/(m^2).    EXAM:  GENERAL: This is a " well-developed 51 y.o. female who appears her stated age  HEAD & NECK: Grossly normal.  No palpable thyroid lesions  CARDIAC: RRR without murmur  CHEST/LUNG:  Clear to auscultation  ABDOMEN: Obese.  Nontender.  No hernias or masses appreciated.  LYMPHATIC:  No significant adenopathy appreciated.    EXTREMITIES: Grossly normal.  No evidence of chronic venous stasis.    NEUROLOGIC: Focally intact  INTEGUMENT: No open lesions or ulcers  PSYCHIATRIC: Normal affect. she has a good grasp on the nature of her obesity and the treatment options.    LABS:  Lab Results   Component Value Date    WBC 10.1 04/05/2016    HGB 14.0 04/05/2016    HCT 41.5 04/05/2016    MCV 93 04/05/2016     04/05/2016     INR/Prothrombin Time      Lab Results   Component Value Date    HGBA1C 6.6 (H) 09/26/2016     Lab Results   Component Value Date    ALT 81 (H) 09/26/2016    AST 66 (H) 09/26/2016    ALKPHOS 97 09/26/2016    BILITOT 0.3 09/26/2016       Assessment/Plan: 51 y.o. female who is an excellent candidate for bariatric and metabolic surgery.  After a careful conversation with the patient it was decided that a  Laparoscopic Sleeve Gastrectomy. would be her best option.       I went over the surgery in detail with her.  I went over the nature of the operation and some of the potential consequences of the surgery.  I went over the expected hospital course and discussed laparoscopic versus open surgery, understanding that we will plan on doing this laparoscopically with the possibility of having to convert to an open operation.  I went over some of the risks and complications of the operation including, but not limited to, DVT, pulmonary emboli, pneumonia, postoperative bleeding, wound infection, staple line leak, intra-abdominal sepsis, and possible death.  I also went over some of the potential nutritional concerns such as vitamin B-12, iron, vitamin D, vitamin A, calcium and protein deficiencies.  I will also went over the need for  lifelong nutritional surveillance.  The patient understands and wants to proceed with surgery.  We will submit for prior authorization.      Tejas Prado MD  Health system Department of Surgery

## 2021-06-08 NOTE — TELEPHONE ENCOUNTER
Medication Request  Medication name: Motion sickness for medication patch for flying  [facility]ed [id number]  Reason for request: For motion sickness  When did you use medication last?:  n/a  Patient offered appointment:  n/a  Okay to leave a detailed message: yes  [phone]    FYI: This medication is not listed on the patient's medication list. Patient stated that she doesn't know the name of this medication but it is a patch that goes behind the ear for motion sickness.

## 2021-06-08 NOTE — PROGRESS NOTES
"Evita Vasquez is a 54 y.o. female who is being evaluated via a billable video visit.      The patient has been notified of following:     \"This video visit will be conducted via a call between you and your physician/provider. We have found that certain health care needs can be provided without the need for an in-person physical exam.  This service lets us provide the care you need with a video conversation.  If a prescription is necessary we can send it directly to your pharmacy.  If lab work is needed we can place an order for that and you can then stop by our lab to have the test done at a later time.    Video visits are billed at different rates depending on your insurance coverage. Please reach out to your insurance provider with any questions.    If during the course of the call the physician/provider feels a video visit is not appropriate, you will not be charged for this service.\"    Patient has given verbal consent to a Video visit? Yes    Will anyone else be joining your video visit? No        Video Start Time: 103     Patient presents via video visit for a medication request.  History of severe motion sickness and vomiting with flying.  She has two upcoming trips planned.  Unfortunately, she will be leaving tomorrow morning to attend a  for a family member.  Patient gets significant nausea and vomiting with flying, specifically with landing.  She will be on a smaller plan tomorrow, which is worse for her.  Patient has benefited from Scopolamine in the past and denies any significant side effects.  She has tried OTC dramamine in the past without great effect.    Additional provider notes: GENERAL: Healthy, alert and no distress  EYES: Eyes grossly normal to inspection. No discharge or erythema, or obvious scleral/conjunctival abnormalities.  RESP: No audible wheeze, cough, or visible cyanosis.  No visible retractions or increased work of breathing.    NEURO: Cranial nerves grossly intact. Mentation " and speech appropriate for age.  PSYCH: Mentation appears normal, affect normal/bright, judgement and insight intact, normal speech and appearance well-groomed    Assessment/Plan:  1. Motion sickness, initial encounter  Prescribed Scopolamine for upcoming plan trips.  Discussed proper use and possible side effects of medication.    - scopolamine (TRANSDERM-SCOP) 1.5 mg transdermal patch; Place 1 patch on the skin every third day.  Dispense: 4 patch; Refill: 1      Video-Visit Details    Type of service:  Video Visit    Video End Time (time video stopped): 1045  Originating Location (pt. Location): Home    Distant Location (provider location):  [facility]     Platform used for Video Visit: Milagros Duncan NP

## 2021-06-08 NOTE — TELEPHONE ENCOUNTER
I have never prescribed this for the patient.  Please have her schedule a virtual visit.    Mary Kate Duncan NP

## 2021-06-08 NOTE — PROGRESS NOTES
I have submitted a Prior Authorization request on behalf of this patient [facility] (Primary) and to [facility] (secondary) to be approved for a LSG with Dr. Tejas Prado.  Copy to Darrick to scan to patients chart

## 2021-06-08 NOTE — PROGRESS NOTES
Workflow updated.     Bruno Tapia [facility] Surgery  P: [phone]  F: [phone]

## 2021-06-09 NOTE — ANESTHESIA POSTPROCEDURE EVALUATION
Patient: Evita Vasquez  #2  LAPAROSCOPIC SLEEVE GASTRECTOMY  Anesthesia type: general    Patient location: PACU  Last vitals:   Vitals:    03/13/17 1215   BP: 162/74   Pulse: 78   Resp: 16   Temp:    SpO2: 97%     Post vital signs: stable  Level of consciousness: drowsy but responds to questions  Post-anesthesia pain: pain controlled, looks comfortable  Post-anesthesia nausea and vomiting: no  Pulmonary: supplemental oxygen when seen.  Cardiovascular: stable and blood pressure at baseline  Hydration: adequate  Anesthetic events: no    QCDR Measures:  ASA# 11 - Donna-op Cardiac Arrest: ASA11B - Patient did NOT experience unanticipated cardiac arrest  ASA# 12 - Donna-op Mortality Rate: ASA12B - Patient did NOT die  ASA# 13 - PACU Re-Intubation Rate: ASA13B - Patient did NOT require a new airway mgmt  ASA# 10 - Composite Anes Safety: ASA10A - No serious adverse event  ASA# 38 - New Corneal Injury: ASA38A - No new exposure keratitis or corneal abrasion in PACU    Additional Notes:

## 2021-06-09 NOTE — ANESTHESIA CARE TRANSFER NOTE
Last vitals:   Vitals:    03/13/17 1136   BP: 155/71   Pulse: 73   Resp: 20   Temp: 37  C (98.6  F)   SpO2: 99%     Patient's level of consciousness is drowsy  Spontaneous respirations: yes  Maintains airway independently: yes  Dentition unchanged: yes  Oropharynx: oropharynx clear of all foreign objects    QCDR Measures:  ASA# 20 - Surgical Safety Checklist: ASA20A - Safety Checks Done  PQRS# 430 - Adult PONV Prevention: 4558F - Pt received => 2 anti-emetic agents (different classes) preop & intraop  ASA# 8 - Peds PONV Prevention: NA - Not pediatric patient, not GA or 2 or more risk factors NOT present  PQRS# 424 - Donna-op Temp Management: 4559F - At least one body temp DOCUMENTED => 35.5C or 95.9F within required timeframe  PQRS# 426 - PACU Transfer Protocol: - Transfer of care checklist used  ASA# 14 - Acute Post-op Pain: ASA14B - Patient did NOT experience pain >= 7 out of 10    I completed my SBAR handoff to the receiving nurse per policy and procedure.

## 2021-06-09 NOTE — PROGRESS NOTES
"HPI: Pt is here for follow up of a Laparoscopic Sleeve Gastrectomy. she is doing well. Taking po well. She is drinking about 60 ounces of water and then 2 protein shakes per day. No vomiting. No fevers or chills. Ambulating without problems.       /67  Pulse 60  Ht 5' 7\" (1.702 m)  Wt (!) 223 lb (101.2 kg)  LMP 02/21/2017  SpO2 97%  BMI 34.93 kg/m2  Wt Readings from Last 3 Encounters:   03/29/17 (!) 223 lb (101.2 kg)   03/13/17 (!) 229 lb 5 oz (104 kg)   02/27/17 (!) 244 lb (110.7 kg)     Body mass index is 34.93 kg/(m^2).    EXAM:  GENERAL:Appears well  ABDOMEN: Incisions healing well      Assessment/Plan: Pt s/p Laparoscopic Sleeve Gastrectomy. Doing well. Diet and activity discussed. she will [facility].    Tejas Prado MD  [facility] Department of Surgery  "

## 2021-06-09 NOTE — ANESTHESIA PREPROCEDURE EVALUATION
Anesthesia Evaluation      Patient summary reviewed   No history of anesthetic complications     Airway   Mallampati: I  Neck ROM: full   Pulmonary - negative ROS and normal exam                          Cardiovascular - normal exam  (+) hypertension, , hypercholesterolemia,      Neuro/Psych - negative ROS     Endo/Other    (+) diabetes mellitus type 2 well controlled, obesity,      GI/Hepatic/Renal - negative ROS      Other findings: Non-alcoholic fatty liver disease. Glaucoma.  Motion sickness, but denies PONV.      Dental - normal exam                        Anesthesia Plan  Planned anesthetic: general endotracheal    ASA 3   Induction: intravenous   Anesthetic plan and risks discussed with: patient    Post-op plan: routine recovery

## 2021-06-09 NOTE — PROGRESS NOTES
ASSESSMENT:  1. Preop examination  For weight loss surgery as below.    2. Obesity  BMI greater than 30 with comorbidities.    3. Type 2 diabetes mellitus  Diagnosed 2016, A1c 6.6  - Lipid Cascade; Future  - Lipid Cascade    4. Rhinitis  Chronic long-standing.    5. Hypertension  Well-controlled.    6. Hyperlipidemia  Overall well controlled, currently not on a statin medication.    7. NAFLD (nonalcoholic fatty liver disease)  Diagnosed via imaging studies, presumed metabolic.    8. Simple goiter  Patient has normal thyroid function  - Thyroid Stimulating Hormone (TSH)    9. Glaucoma  Followed by ophthalmology.    10. Vitamin D deficiency  Mild deficiency of 5000 units daily.    11. Pre-operative clearance     - HM1(CBC and Differential)  - Urinalysis-UC if Indicated  - Comprehensive Metabolic Panel  - APTT(PTT)  - INR  - Glycosylated Hemoglobin A1c  - HM1 (CBC with Diff)  - Glycosylated Hemoglobin A1c    PLAN:  1.  Hemoglobin A1c, TSH, lipid profile, INR PTT urinalysis comprehensive metabolic profile and CBC are reviewed.  2.  Laboratory studies essentially within normal limits.  3.  Patient is cleared for surgery.    Orders Placed This Encounter   Procedures     Thyroid Stimulating Hormone (TSH)     Lipid Cascade     Standing Status:   Future     Number of Occurrences:   1     Standing Expiration Date:   2/27/2018     Order Specific Question:   Fasting is required?     Answer:   Yes     HM1 (CBC with Diff)     There are no discontinued medications.    No Follow-up on file.    Patient approved for surgery with general or local anesthesia.     CHIEF COMPLAINT:  Chief Complaint   Patient presents with     Pre-op Exam     Subjective:     Scheduled Procedure: laparoscopic sleeve gastrectomy   Surgery Date:  3-13-17  Surgery Location: [facility]  Surgeon:  Dr. Prado   Recovery Plan: a few days in the hospital, then home     Evita is a 51 y.o. female here for an internal medicine pre-operative consultation. The exam is  requested by Dr. Prado in preparation for laparoscopic sleeve gastrectomy to be performed at [facility] on March 13, 2017. Today s examination on 2/27/2017 is done to review the underlying surgical condition of morbid obesity, clear for anesthesia, and review medical problems with appropriate changes in medications    Evita has tolerated previous surgeries well without bleeding or anesthesia difficulty.    Obesity: She began discussing bariatric surgery as an option following a visit to the emergency room in April of 2016. She went to a bariatric care center from April to September. She decided to get the surgery in September. She states that she has had some success in the past with diet and exercise. She had a chest x-ray on 2/23/2017 and an EKG on 2/21/2017.    Current Outpatient Prescriptions   Medication Sig Dispense Refill     ALLEGRA-D 24 HOUR 180-240 mg per 24 hr tablet TAKE 1 TABLET BY MOUTH DAILY 90 tablet 0     cholecalciferol, vitamin D3, 1,000 unit tablet Take 2,000 Units by mouth daily.        cyanocobalamin, vitamin B-12, 1,000 mcg Subl Place 1,000 mcg under the tongue daily.       fluticasone (FLONASE) 50 mcg/actuation nasal spray SHAKE WELL AND USE 2 SPRAYS IN EACH NOSTRIL EVERY DAY AS NEEDED 48 g 3     lisinopril (PRINIVIL,ZESTRIL) 40 MG tablet TAKE 1 TABLET BY MOUTH ONCE DAILY 90 tablet 2     multivitamin therapeutic (THERAGRAN) tablet Take 1 tablet by mouth daily.       omeprazole (PRILOSEC) 20 MG capsule Take 1 capsule (20 mg total) by mouth daily. Open capsule and sprinkle on food. 90 capsule 0     timolol maleate (TIMOPTIC) 0.5 % ophthalmic solution INT 1 GTT IN OU QD  11     ursodiol (ACTIGALL) 300 mg capsule Take 1 capsule (300 mg total) by mouth 2 (two) times a day. Start 2 wks after surgery. Do not open capsule. Swallow whole with warm water. 180 capsule 1     No current facility-administered medications for this visit.      Allergies   Allergen Reactions     Penicillins  Hives     Childhood     Immunization History   Administered Date(s) Administered     Influenza, Seasonal, Inj PF 10/01/2011     Influenza, inj, historic 11/10/2008, 10/20/2009, 10/26/2010, 10/08/2013, 10/21/2014, 11/01/2016     Influenza, seasonal,quad inj 36+ mos 09/28/2015     Influenza, seasonal,quad inj 6-35 mos 12/07/2012     Td, historic 10/19/2000     Tdap 03/07/2011     Patient Active Problem List   Diagnosis     Hyperlipidemia     Hypertension     Abnormal Pap Smear Of Cervix     Simple goiter     NAFLD (nonalcoholic fatty liver disease)     Glaucoma     Vitamin D deficiency     Obesity     Type 2 diabetes mellitus     Rhinitis     Social History     Social History     Marital status:      Spouse name: N/A     Number of children: 1     Years of education: N/A     Occupational History     HS       Social History Main Topics     Smoking status: Former Smoker     Types: Cigarettes     Smokeless tobacco: Never Used     Alcohol use Yes      Comment: occasional, weekends, with meals. Wine     Drug use: No     Sexual activity: Yes     Partners: Male     Other Topics Concern     Not on file     Social History Narrative     Past Surgical History:   Procedure Laterality Date     CERVICAL BIOPSY  W/ LOOP ELECTRODE EXCISION  2005     TONSILLECTOMY         Family History   Problem Relation Age of Onset     Heart attack Father      Heart attack Brother      Diabetes Brother      Heart disease Brother      Cirrhosis Mother      Hypertension Mother      Diabetes type II Mother      No Medical Problems Sister      Diabetes Brother      Heart disease Brother      Diabetes Brother      Heart disease Brother      History of Present Illness  Recent Health  Fever: no  Chills: no  Fatigue: no  Chest Pain: no  Cough: no  Dyspnea: no  Urinary Frequency: no  Nausea:no   Vomiting: no  Diarrhea: no  Abdominal Pain: no  Easy Bruising: no  Lower Extremity Swelling:no   Poor Exercise Tolerance:no     Pertinent  History  Prior Anesthesia: yes  Previous Anesthesia Reaction:  no  Diabetes:yes     Cardiovascular Disease:no   Pulmonary Disease: no  Renal Disease:no   GI Disease: no  Sleep Apnea: no  Thromboembolic Problems: no  Clotting Disorder:no   Bleeding Disorder:no   Transfusion Reaction: no  Impaired Immunity:no    Steroid use in the last 6 months: no   Frequent Aspirin use: no    Review of Systems  Review of Systems   She is a former patient of Dr. Woodward. She uses Allegra D and fluticasone as needed for allergies. She has had allergy shots in the past, as well as allergy testing. She has allergies to molds, danders, pollens, and hay fever. She is currently taking lisinopril to control her blood pressure. She has been on Lipitor and simvastatin in the past. She was taken off of simvastatin a year ago due to liver labs and was not placed back on statin medication. She stays that she was found to have a fatty liver via ultrasound. He has a normal biopsy of a goiter in the past. She is currently using Timoptic for pre-glaucoma and will be seeing ophthalmology at the end of March. Her A1c on 9/26/2016 was 6.6%. She received the flu shot at work this season. She has been working on completing an advanced directive. She takes 5000 units of Vitamin D daily. All other systems are negative.    Objective:      Vitals:    02/27/17 1444   BP: 114/72   Pulse: (!) 53   Resp: 14   Temp: 97.9  F (36.6  C)   SpO2: 98%     Wt Readings from Last 3 Encounters:   02/27/17 (!) 244 lb (110.7 kg)   02/23/17 (!) 244 lb (110.7 kg)   02/23/17 (!) 244 lb (110.7 kg)     BP Readings from Last 3 Encounters:   02/27/17 114/72   01/13/17 148/84   09/28/16 140/81     Body mass index is 38.22 kg/(m^2).    Physical Exam:  Constitutional: she appears well-developed and well-nourished.   HENT:   Right Ear: External ear normal.   Left Ear: External ear normal.   Nose: Nose normal.   Mouth/Throat: Oropharynx is clear and moist.   Eyes: Conjunctivae and EOM are  normal. Pupils are equal, round, and reactive to light. Right eye exhibits no discharge. Left eye exhibits no discharge.   Neck: No thyromegaly present.   Cardiovascular: Normal rate, regular rhythm and normal heart sounds. No murmur heard.  Pulmonary/Chest: Effort normal and breath sounds normal.   Abdominal: Soft. Bowel sounds are normal. He exhibits no distension and no mass. There is no tenderness. There is no rebound and no guarding.   Musculoskeletal: Normal range of motion.   No joint swelling or deformity.   Lymphadenopathy:     He has no cervical adenopathy.   Neurological: she is alert. she has normal reflexes.   Skin: Skin is warm and dry. No rash noted.   Psychiatric: she has a normal mood and affect.     DATA REVIEWED:  Additional History from Old Records Summarized (2): None  Decision to Obtain Records (1): None  Radiology Tests Summarized or Ordered (1): Reviewed 2/23/2017 chest x-ray note.  Labs Reviewed or Ordered (1): Reviewed 9/26/2016 labs. Ordered labs.  Medicine Test Summarized or Ordered (1): Reviewed 2/21/2017 EKG note.  Independent Review of EKG, X-RAY, or RAPID STREP (2 each): None    The visit lasted a total of 20 minutes face to face with the patient. Over 50% of the time was spent counseling and educating the patient about preparation for surgery.    IMitch, am scribing for and in the presence of, Dr. Peter.    I, Dr. Peter, personally performed the services described in this documentation, as scribed by Mitch Griffith in my presence, and it is both accurate and complete.    Total Data Points: 3

## 2021-06-09 NOTE — PROGRESS NOTES
Patient attended group class to review pre-op instructions for upcoming surgery.  Discussed admission process and hospital course.  Pharmacy information packet given and explained. Patient was given exercises to work on post-op for maintaining muscle mass and strengthening that was created by Physical Therapy.  Bariatric quiz reviewed. Rationale for correct answers given and pt. verbalized understanding. Discharge instructions, information card and follow up appointments given and reviewed with pt at this time and patient verbalized understanding. She will have her H&P on 2/27/2017 with Dr. Peter and do her CXR and EKG on 2/23 at [place], then labs with her primary on 2/27. Prescriptions sent for Omeprazole and Actigall to be started after surgery.  Dior Bennett RN, CBN

## 2021-06-09 NOTE — PROGRESS NOTES
Time in: 10:00/Time out: 11:00      Pt presents for 1 week post op dietitian follow up. Reports tolerating full liquids well. Denies n/v, constipation/diarrhea, or significant pain. Taking MVI and SL B12 appropriately. Taking adequate fluids/day. Educated pt on pureed and soft to bariatric regular diets. Provided grocery list and sample meal plan for each diet stage.    Pt will begin pureed diet on 3/27 and advance as tolerated to softs/bariatric regular on 4/17/2017. Instructed pt to begin 500 mg calcium citrate BID and 5000IU Vitamin D3 when starting the soft/regular bariatric diet phase. Pt to follow up with RD at 3 months post op.

## 2021-06-09 NOTE — PROGRESS NOTES
Time in: 1:00 /Time out: 2:00  .  Initial Weight: 252 lbs  Weight: 244 lb (110.7 kg)  Weight loss from initial: 8  % Weight loss: 3.17 %    Weight goal: At or below initial weight    Pt presents for nutrition education class for liquid diets. Educated pt on 2 week preop and 1 week post op liquid diets. Discussed appropriate liquids and demonstrated portions for each of the food groupings during each diet phase. Reviewed appropriate calories/protein/fluid goals during 2 week pre-op liquid diet. Educated on correct vitamins/minerals to take after surgery in correct dosage and frequency. Provided grocery list and sample menu plan for each diet stage, as well as unflavored protein powder samples.      Pt will begin 2 week preop liquid diet 2/27/2017, will do clear liquids the day before surgery. Pt is scheduled for LSG on 3/13/2017, and will then follow two weeks  post op liquid diet. Pt  will f/u with RD 1 week post op for further diet advancement.

## 2021-06-10 NOTE — TELEPHONE ENCOUNTER
New Appointment Needed  What is the reason for the visit:    lab appointment  Provider Preference: lab only   How soon do you need to be seen?: next week  Waitlist offered?: No  Okay to leave a detailed message:  Yes.    She was exposed to covid 19 and has been self quarantined. Today would be her 14th day of being quarantined. Wondering if she can schedule lab only visit next week or would pcp prefer patient having labs drawn through lab drive up?  Please call to let her know.

## 2021-06-10 NOTE — TELEPHONE ENCOUNTER
Patient will need to do a urine test for diabetes monitoring, which I don't think she can do through the drive-up.  As long as she has finished quarantine and is asymptomatic, she can schedule a lab only visit in the clinic.    Mary Kate Duncan NP

## 2021-06-11 NOTE — PROGRESS NOTES
Health and Behavior Assessment with Intervention, Post Op Follow up (30 minutes): Met with patient 1:1 to discuss postoperative issues.  Weight today was 192 pounds.  She has continued to make positive progress 4 months postoperatively.  Conversely, she noted that she is getting more attention because of her weight loss and is questioning who she should tell especially when she returns to work.  She is also trying to reconcile the significant weight loss with how she views herself in the mirror.  She will follow-up if necessary.  Diagnoses: E 66.01

## 2021-06-11 NOTE — PROGRESS NOTES
Office Visit - Follow Up   Evita Vasquez   51 y.o. female    Date of Visit: 6/22/2017    Chief Complaint   Patient presents with     Hypertension     follow up BP check        Assessment and Plan   1. Essential hypertension with goal blood pressure less than 130/80  Blood pressure is well managed.  Patient was encouraged to continue to check her blood pressure once or twice daily.  She is to continue with her daily exercise.  Reduce sodium and call the clinic if blood pressure is trending high.    2. Motion sickness, initial encounter  We will try a different treatment.  Prescription of scopolamine was sent to patient's pharmacy  - scopolamine (TRANSDERM-SCOP) 1.5 mg (1 mg over 3 days) transdermal patch; Place 1 patch on the skin every third day.  Dispense: 4 patch; Refill: 0       History of Present Illness   This 51 y.o. old female female patient with history of hyperlipidemia, hypertension, glaucoma, vitamin D deficiency, prediabetic, morbid obesity with recent laparoscopic sleeve gastrectomy.  Patient was seen in the clinic 2 weeks ago for lightheadedness.  Patient reported that she was hypotensive prior to her surgery and she was taking 40 mg of lisinopril daily but after surgery she started having lightheadedness when she takes her lisinopril.  I did make changes to her lisinopril during that visit she was instructed to take 10 mg of lisinopril daily and to follow-up.  Patient is here today for follow-up.  Patient reported that she has not been having lightheadedness.  She has been checking her blood pressure twice daily and her blood pressures have been ranging between 107 to 134 systolically and 70s diastolically.  Patient denied chest pain, shortness of breath, fainting, and palpitations.  Patient reported that she does have history of motion sickness especially when she is flying on an airplane.  She reported that last year when she flew to [place] she used Dramamine but she was still sick while she was in  "flight.  She is requesting a different treatment option for her motion sickness as she is planning to travel in the next couple of weeks for her vacation.    Review of Systems: A comprehensive review of systems was negative except as noted.     Medications, Allergies and Problem List   Reviewed and updated     Physical Exam   General Appearance: Alert and oriented    /72 (Patient Site: Right Arm, Patient Position: Sitting, Cuff Size: Adult Regular)  Pulse 63  Ht 5' 7\" (1.702 m)  Wt 194 lb 11.2 oz (88.3 kg)  LMP 05/17/2017  BMI 30.49 kg/m2  Physical Examination: General appearance - alert, well appearing, and in no distress  Eyes: pupils equal and reactive, extraocular eye movements intact  Mouth: mucous membranes moist, pharynx normal without lesions  Neurological: alert, oriented, normal speech, no focal findings or movement disorder noted  Extremities: No edema, no clubbing or cyanosis  Psychiatric: Normal affect. Does not appear anxious or depressed.       Additional Information   Current Outpatient Prescriptions   Medication Sig Dispense Refill     calcium carbonate (OS-OBED) 500 mg calcium (1,250 mg) chewable tablet Chew 1 tablet daily.       cetirizine (ZYRTEC) 5 MG tablet Take 5 mg by mouth daily.       cholecalciferol, vitamin D3, 5,000 unit Tab Take 5,000 Units by mouth daily.       cyanocobalamin, vitamin B-12, 2,500 mcg Subl Place 2,500 mcg under the tongue daily.       fluticasone (FLONASE) 50 mcg/actuation nasal spray 2 sprays into each nostril daily as needed.        lisinopril (PRINIVIL,ZESTRIL) 10 MG tablet Take 1 tablet (10 mg total) by mouth daily. 30 tablet 11     pediatric multivitamin-iron (POLY-VI-SOL WITH IRON) chewable tablet Chew 1 tablet daily.        timolol maleate (TIMOPTIC) 0.5 % ophthalmic solution Administer 1 drop to both eyes bedtime. Pre glaucoma       ursodiol (ACTIGALL) 300 mg capsule Take 1 capsule (300 mg total) by mouth 2 (two) times a day. Start 2 wks after surgery. " Do not open capsule. Swallow whole with warm water. 180 capsule 1     scopolamine (TRANSDERM-SCOP) 1.5 mg (1 mg over 3 days) transdermal patch Place 1 patch on the skin every third day. 4 patch 0     No current facility-administered medications for this visit.      Allergies   Allergen Reactions     Penicillins Hives     Childhood     Mold Other (See Comments)     congestion     Social History   Substance Use Topics     Smoking status: Former Smoker     Types: Cigarettes     Smokeless tobacco: Never Used     Alcohol use Yes      Comment: occasional, weekends, with meals. Wine       Time: total time spent with the patient was 15 minutes of which >50% was spent in counseling and coordination of care     Noemí Walter, CNP

## 2021-06-11 NOTE — PROGRESS NOTES
"Post-op Surgical Weight Loss Diet Evaluation     Assessment:  Pt presents for 3 months post-op RD visit, s/p LSG on 3/13/2017 with Dr. Prado. Today we reviewed current eating habits and level of physical activity, and instructed on the changes that are required for successful bariatric outcomes.    Patient Progress: Pt states she has concerns with BP - saw  1.5 weeks ago; lowered BP meds (stated dr told her to \"eat something\" as his last words) rec coming to see Dr. Millan in the future  Pt is doing well - answered questions and addressed her concerns throughout appt    Pt's Initial Weight: 252 lbs  Weight: 199 lb (90.3 kg)  Weight loss from initial: 53  % Weight loss: 21.03 %    Body mass index is 31.17 kg/(m^2).     Concerns: Pt is doing a great job - very focused on keys to success    Diabetes  Testing Blood Sugars: not testing   Last A1C, (per pt. report): 5.5%    Vitamins   Multi Vit with Iron: yes  Calcium Citrate: yes  B12: yes  D3: yes    Do you experience hunger? Yes - drinks protein shake   Do you have \"dumping\" syndrome? No  Nausea: no  Vomiting: no  Diarrhea: yes - 2 incidents   Constipation: yes - taking miralax and Doculax   Hair loss: yes - more hair coming out     Diet Recall/Time:   Breakfast: Greek yogurt (12g) OR 1 egg, 1 [place] gonzalez on sat/sun w/ salsa and cheese (15g)   Am Snack: string cheese (8g) (onyl during school   Lunch: rotisserie chicken and occasional crackers/fruit/Veg (15g)  Pm snack: string cheese OR protein shake (30g)   Dinner: Pro/Veg (15g)   HS Snack: protein shake (30g) if hungry     Typical Snacks: string cheese OR protein shake     Proteins/Veg/Fruits/CHO (NOT well tolerated): No    Estimated protein intake: 102 grams    Estimated portion size per meals: 1/4-1/2cup/meal    Incorporation of vegetables, fruits, carbohydrates into diet/meals using   Bariatric \"Plate Method\"   The patient and I discussed the importance of including lean/low fat protein at each meal, " "including a vegetable/fruit, and limiting carbohydrate intake to less than 25% of plate volume. Always keeping within approved perimeters of post op meal portion sizes according to 3 months post op guidelines.    Healthy Fats: olive oil    Meals per week away from home: 1-2X/week  Recommended limiting eating out to no more than 2x/week.    Meal Duration:20 minutes  Encouraged slowing meal times down, 20-30 minutes, chewing to applesauce consistency.     Fluid-meal separation:  Fluids are  30min before and 30 minutes after meals.  The patient and I reviewed the anatomy of the bypass and why  fluids from a meal is so important.    Fluid Intake  Water: 64oz  Caffeine: 1 cup black tea  Alcohol: none  Carbonation: none  Milk: none  Juice: none    Discussed the importance of adequate hydration after surgery and the goal of 64+ oz of fluid daily.   The patient understands the importance of avoiding all carbonated, caffeinated, and sweetened drinks; and instead choosing 64oz plain water.    Exercise  Walking 10,000 steps/day   Gym 1-2X/week - focusing on weights     Pt's understands that 30-60 minutes of daily activity is an important part of bariatric surgery success.   Encouraged pt to incorporate strength training exercise along with cardiovascular exercise as well, most days of the week.      PES statement:    1. (NC-1.4) Altered GI Function related to Alteration in gastrointestinal tract structure and/or function/ Decreased functional length of the GI tract as evidenced by Weight loss of 21.03% initial body weight; sleeve gastrectomy    Intervention    Discussion  1. Discussed 3 months  Post-Op Nutritional Guidelines for LSG  2. Recommended to consume 15-20gm protein at 3 meals daily, along with protein supplement/\"planned protein containing snack\" of 15-30gm protein, to reach goal of 60-80 gm protein daily.  3. Educated on post-op vitamin regimen: Multi Vit + iron 2x/day, calcium citrate 400-600 mg " "2x/day, 0883-1642 mcg of Sublingual B-12 daily, and 5000 IU Vitamin D3 daily (MVI and calcium can be taken at the same time BID)  4. Reviewed lean protein sources  5. Bariatric Plate Method-  including lean/low fat protein at each meal, including a vegetable/fruit, and limiting carbohydrate intake to less than 25% of plate volume. Approved perimeters of post op meal portion sizes according to 3 months post op guidelines.  Instructions  1. Include 15-20gm protein at each meal, along with protein supplement/\"planned protein containing snack\" of 15-30gm protein, to reach goal of 60-80 gm protein daily.  2. Increase fluid intake to 64oz daily: choose plain or calorie/carbonation-free beverages.  3. Incorporate daily structured activity, 30-60 minutes most days of the week  4. Recommended pt to start taking: Multi Vit + iron 2x/day, calcium citrate 400-600 mg 2x/day, 1580-8330 mcg of Sublingual B-12 daily, and 5000 IU Vitamin D3 daily. (MVI and calcium can be taken at the same time)  5. Read food labels more consistently: keeping total fat grams <10, total sugar grams <10, fiber >3gm per serving.  6. Increase vegetable/fruit intake, by having a vegetable or fruit with each meal daily.  7. Practice plate method: 1/2 plate lean/low fat protein source, vegetable/fruit, <25% of plate complex carbohydrates.  8. Separate fluids 30 minutes before/after meal times.  9. Practice eating off of smaller plates/bowls, chewing to applesauce consistency, taking 20-30 minutes to eat in a calm/relaxed environment without distractions of tv/email/cell phone.    Handouts provided:  3 months  Post-Op Nutritional Guidelines for LSG    Monitor/Evaluation    Pt to follow up for 6 months  post-op visit with bariatrician     Time In: 8:30a  Time Out: 9:30a      ABN signed: Yes      "

## 2021-06-11 NOTE — PROGRESS NOTES
ASSESSMENT:  1. Lightheaded  Likely secondary to low blood pressure as patient does not experience this symptom when she does not take her lisinopril. We are going to reduce lisinopril and reassess  - Basic Metabolic Panel  - HM2(CBC w/o Differential): Normal    2. Essential hypertension with goal blood pressure less than 130/80  Decrease lisinopril to 10 mg daily.  - lisinopril (PRINIVIL,ZESTRIL) 10 MG tablet; Take 1 tablet (10 mg total) by mouth daily.  Dispense: 30 tablet; Refill: 11    3. Prediabetes  A1C was elevated prior to weight lost surgery.   - Glycosylated Hemoglobin A1c: Normal A1C at 5.5    PLAN:  Check your blood pressure twice daily and record the readings. Stand up slowly from a sitting position to avoid the dizziness    Orders Placed This Encounter   Procedures     Glycosylated Hemoglobin A1c     Basic Metabolic Panel     HM2(CBC w/o Differential)     Medications Discontinued During This Encounter   Medication Reason     lisinopril (PRINIVIL,ZESTRIL) 40 MG tablet        No Follow-up on file.    CHIEF COMPLAINT:  Chief Complaint   Patient presents with     Hypertension     pt has been off lisinopril for 2 months       HISTORY OF PRESENT ILLNESS:  Evita is a 51 y.o. female presenting to the clinic today for a follow-up of her hypertension.    She has not been taking her lisinopril 40 mg for the past two months. She reduced her dose to 20 mg once per day because she was having dizzy spells. She would stand from a sitting position, lose balance, and nearly fall backward. She has to hold onto something and wait for the dizziness to subside. She took the lisinopril last week and experienced dizziness again. She checked her blood pressure and it was 100/62 mmHg. She has since discontinued taking the lisinopril. She last took it 9 days ago. She has been monitoring her blood pressure at home. Her systolic pressures have been in the 120s without the lisinopril. Her blood pressure was 124/78 mmHg this  morning. She is wondering if she needs to be on lisinopril any longer. Her blood pressure was elevated today upon intake at 140/98 mmHg.    REVIEW OF SYSTEMS:   She has regurgitation from two of her cardiac valves. All other systems are negative.    PFSH:  She had gastric bypass surgery about 3 months ago. She has lost 44 lbs since her surgery. She has been working to adopt a healthier lifestyle. She is a .    Past Medical History:   Diagnosis Date     Abnormal Pap smear of cervix      Diabetes mellitus      Glaucoma      Hyperlipidemia      Hypertension      NAFLD (nonalcoholic fatty liver disease)      Obesity      Rhinitis     chronic     Simple goiter      Vitamin D deficiency      Family History   Problem Relation Age of Onset     Heart attack Father      Heart attack Brother      Diabetes Brother      Heart disease Brother      Cirrhosis Mother      Hypertension Mother      Diabetes type II Mother      No Medical Problems Sister      Diabetes Brother      Heart disease Brother      Diabetes Brother      Heart disease Brother      Past Surgical History:   Procedure Laterality Date     CERVICAL BIOPSY  W/ LOOP ELECTRODE EXCISION  2005     COLPOSCOPY       DE LAP, EARLE RESTRICT PROC, LONGITUDINAL GASTRECTOMY N/A 3/13/2017    Procedure: LAPAROSCOPIC SLEEVE GASTRECTOMY;  Surgeon: Tejas Prado MD;  Location: [facility];  Service: General     TONSILLECTOMY       WISDOM TOOTH EXTRACTION       TOBACCO USE:  History   Smoking Status     Former Smoker     Types: Cigarettes   Smokeless Tobacco     Never Used     VITALS:  Vitals:    06/08/17 1538 06/08/17 1540 06/08/17 1541 06/08/17 1544   BP: (!) 140/98 (!) 140/98 156/80 148/75   Patient Site:  Right Arm     Patient Position:  Lying     Cuff Size:  Adult Regular     Pulse:   80 84   SpO2:       Weight:       Height:         Wt Readings from Last 3 Encounters:   06/08/17 200 lb 3.2 oz (90.8 kg)   03/29/17 (!) 223 lb (101.2 kg)   03/13/17  (!) 229 lb 5 oz (104 kg)     Body mass index is 31.36 kg/(m^2).    PHYSICAL EXAM:  General Appearance: Alert, cooperative, no distress, appears stated age.  Lungs: Clear to auscultation bilaterally, respirations unlabored.  Heart: Regular rate and rhythm, S1 and S2 normal, no murmur, rub, or gallop. Radial pulses regular and intact.  Psychiatric: Normal mood and affect.    ADDITIONAL HISTORY SUMMARIZED (2): None.  DECISION TO OBTAIN EXTRA INFORMATION (1): None.   RADIOLOGY TESTS (1): None.  LABS (1): Ordered labs.  MEDICINE TESTS (1): None.  INDEPENDENT REVIEW (2 each): None.    The visit lasted a total of 22 minutes face to face with the patient. Over 50% of the time was spent counseling and educating the patient about continued management of her blood pressure.    Connor DUTTON, lyle scribing for and in the presence of, Promise Amajiri.    Noemí DUTTON CNP, personally performed the services described in this documentation, as scribed by Connor Rangel in my presence, and it is both accurate and complete.    MEDICATIONS:  Current Outpatient Prescriptions   Medication Sig Dispense Refill     calcium carbonate (OS-OBED) 500 mg calcium (1,250 mg) chewable tablet Chew 1 tablet daily.       cholecalciferol, vitamin D3, 5,000 unit Tab Take 5,000 Units by mouth daily.       cyanocobalamin, vitamin B-12, 2,500 mcg Subl Place 2,500 mcg under the tongue daily.       fluticasone (FLONASE) 50 mcg/actuation nasal spray 2 sprays into each nostril daily as needed.        omeprazole (PRILOSEC) 20 MG capsule Take 1 capsule (20 mg total) by mouth daily. Open capsule and sprinkle on food. 90 capsule 0     pediatric multivitamin-iron (POLY-VI-SOL WITH IRON) chewable tablet Chew 1 tablet daily.        timolol maleate (TIMOPTIC) 0.5 % ophthalmic solution Administer 1 drop to both eyes bedtime. Pre glaucoma       ursodiol (ACTIGALL) 300 mg capsule Take 1 capsule (300 mg total) by mouth 2 (two) times a day. Start 2 wks after surgery. Do  not open capsule. Swallow whole with warm water. 180 capsule 1     lisinopril (PRINIVIL,ZESTRIL) 10 MG tablet Take 1 tablet (10 mg total) by mouth daily. 30 tablet 11     No current facility-administered medications for this visit.        Total data points: 1

## 2021-06-12 NOTE — TELEPHONE ENCOUNTER
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  On Tuesday got a flu shot by Tuesday night sneezing nose running.  This morning woke up with nonstop coughing and slight difficulty breathing and sore throat.  Evita feels warm but has taken tylenol.  FNA advised to go to [facility] and patient agreed.      COVID 19 Nurse Triage Plan/Patient Instructions    Please be aware that novel coronavirus (COVID-19) may be circulating in the community. If you develop symptoms such as fever, cough, or SOB or if you have concerns about the presence of another infection including coronavirus (COVID-19), please contact your health care provider or visit [email or web address].     Disposition/Instructions    Virtual Visit with provider recommended. Reference Visit Selection Guide.    Thank you for taking steps to prevent the spread of this virus.  o Limit your contact with others.  o Wear a simple mask to cover your cough.  o Wash your hands well and often.    Resources    [facility]: About COVID-19: [email or web address]    CDC: What to Do If You're Sick: www.cdc.gov/coronavirus/2019-ncov/about/steps-when-sick.html    CDC: Ending Home Isolation: www.cdc.gov/coronavirus/2019-ncov/hcp/disposition-in-home-patients.html     CDC: Caring for Someone: www.cdc.gov/coronavirus/2019-ncov/if-you-are-sick/care-for-someone.html     [facility]: Interim Guidance for Hospital Discharge to Home: www.health.[facility].[place].us/diseases/coronavirus/hcp/hospdischarge.pdf    [facility] clinical trials (COVID-19 research studies): [email or web address]     Below are the COVID-19 hotlines at the [facility] of Health ([facility]). Interpreters are available.   o For health questions: Call 687-764-1569 or 1-315.511.4970 (7 a.m. to 7 p.m.)  o For questions about schools and childcare: Call 943-211-2258 or 1-591.731.1522 (7 a.m. to 7 p.m.)       Reason for Disposition    [1] COVID-19 infection suspected by caller or  triager AND [2] mild symptoms (cough, fever, or others) AND [3] no complications or SOB    Additional Information    Negative: SEVERE difficulty breathing (e.g., struggling for each breath, speaks in single words)    Negative: Difficult to awaken or acting confused (e.g., disoriented, slurred speech)    Negative: Bluish (or gray) lips or face now    Negative: Shock suspected (e.g., cold/pale/clammy skin, too weak to stand, low BP, rapid pulse)    Negative: Sounds like a life-threatening emergency to the triager    Negative: SEVERE or constant chest pain or pressure (Exception: mild central chest pain, present only when coughing)    Negative: MODERATE difficulty breathing (e.g., speaks in phrases, SOB even at rest, pulse 100-120)    Negative: Patient sounds very sick or weak to the triager    Negative: MILD difficulty breathing (e.g., minimal/no SOB at rest, SOB with walking, pulse <100)    Negative: Chest pain or pressure    Negative: Fever > 103 F (39.4 C)    Negative: [1] Fever > 101 F (38.3 C) AND [2] age > 60    Negative: [1] Fever > 100.0 F (37.8 C) AND [2] bedridden (e.g., nursing home patient, CVA, chronic illness, recovering from surgery)    Negative: HIGH RISK patient (e.g., age > 64 years, diabetes, heart or lung disease, weak immune system) (Exception: Has already been evaluated by healthcare provider and has no new or worsening symptoms)    Negative: Fever present > 3 days (72 hours)    Negative: [1] Fever returns after gone for over 24 hours AND [2] symptoms worse or not improved    Negative: [1] Continuous (nonstop) coughing interferes with work or school AND [2] no improvement using cough treatment per protocol    Protocols used: CORONAVIRUS (COVID-19) DIAGNOSED OR [id number]-X-BQ 8.4.20

## 2021-06-12 NOTE — PROGRESS NOTES
6 mos post op lab orders placed for patient in preparation for appointment with  on 9/20/2017.    Chika Valadez RN, CBN  [facility]  P [phone]  F [phone]

## 2021-06-13 NOTE — PROGRESS NOTES
ASSESSMENT and PLAN:  1. Dysuria  Her UA was not a pure clean catch.  However, the findings on the UA coupled with her symptoms are compelling.  We will treat her with Macrobid.  I made a note for her that if she develops symptoms consistent with a yeast infection that she can call and I would send in Diflucan.  A culture is pending and we will follow-up to ensure that the Macrobid is an appropriate antibiotic.  - Urinalysis-UC if Indicated  - Culture, Urine  - nitrofurantoin, macrocrystal-monohydrate, (MACROBID) 100 MG capsule; Take 1 capsule (100 mg total) by mouth 2 (two) times a day for 7 days.  Dispense: 14 capsule; Refill: 0        There are no discontinued medications.    No Follow-up on file.    Patient Instructions   Your UA is suggestive of a UTI.  We'll send it for a culture to confirm and ensure we're using an antibiotic that will be effective.     I'll send in macrobid for you.  (If you tend to get yeast infections w/ antibiotics and get one, just send me a RPO message and I can send in medication for you)    Today's labs will be available on RPO.  If you aren't signed up, then we'll mail them out.  If anything needs more immediate follow up, we'll also call      CHIEF COMPLAINT:  Chief Complaint   Patient presents with     Urinary Tract Infection     pressure in lower abdomen area. Had a LEEP procedure 2 weeks ago       HISTORY OF PRESENT ILLNESS:  Evita Vasquez is a 52 y.o. female  presenting to the clinic today for concerns about a UTI.  Two weeks ago, she had a LEEP procedure done.  We reviewed that pathology.  I assured her that my interpretation of it is that it is normal as well.  She was concerned that because no high-grade dysplasia was present that there was still some concerning cells seen.  I do not see that noted anywhere.      She's feeling pressure in her lower abd area.  It feels similar to her as when she had UTIs in the past.  Her last UTI was at least 10 years ago if not  longer.  Her symptoms started this morning.  She is having frequency and hesitancy.  She is only urinating small amounts of urine.  She is feeling suprapubic pressure.  She feels an odd sensation when her bladder empties.  She has not noticed any hematuria however she recently finished her menstrual period.  She has not had fevers or chills however she feels a bit flushed today.  She has no nausea or vomiting.  She has some mild low back pain.    REVIEW OF SYSTEMS:    All other systems are negative.    Family History   Problem Relation Age of Onset     Heart attack Father      Heart attack Brother      Diabetes Brother      Heart disease Brother      Cirrhosis Mother      Hypertension Mother      Diabetes type II Mother      No Medical Problems Sister      Diabetes Brother      Heart disease Brother      Diabetes Brother      Heart disease Brother          TOBACCO USE:  History   Smoking Status     Former Smoker     Types: Cigarettes   Smokeless Tobacco     Never Used       VITALS:  Vitals:    10/04/17 1416   BP: 118/64   Patient Site: Right Arm   Patient Position: Sitting   Cuff Size: Adult Regular   Pulse: (!) 56   Temp: 98.6  F (37  C)   TempSrc: Oral   Weight: 185 lb 8 oz (84.1 kg)     Wt Readings from Last 3 Encounters:   10/04/17 185 lb 8 oz (84.1 kg)   09/18/17 185 lb (83.9 kg)   06/22/17 194 lb 11.2 oz (88.3 kg)         PHYSICAL EXAM:  Constitutional:  Reveals an alert, pleasant adult female.   Vitals:  Noted.   Heart: Regular rate and rhythm, S1 and S2 normal, no murmur, rub, or gallop,   Abdomen: Soft, non-tender, bowel sounds active, no masses, no organomegaly   Extremities: no ankle swelling  Back: no CVA tenderness    QUALITY MEASURES:  The following high BMI interventions were performed this visit: weight monitoring    MEDICATIONS:  Current Outpatient Prescriptions   Medication Sig Dispense Refill     calcium carbonate (OS-OBED) 500 mg calcium (1,250 mg) chewable tablet Chew 2 tablets daily.         cetirizine (ZYRTEC) 5 MG tablet Take 5 mg by mouth daily.       cholecalciferol, vitamin D3, 5,000 unit Tab Take 5,000 Units by mouth daily.       clindamycin (CLEOCIN) 300 MG capsule TK 2 CS PO ONE HOUR B DENTAL APPOINTMENT  0     cyanocobalamin, vitamin B-12, 2,500 mcg Subl Place 2,500 mcg under the tongue daily.       fluticasone (FLONASE) 50 mcg/actuation nasal spray 2 sprays into each nostril daily as needed.        lisinopril (PRINIVIL,ZESTRIL) 10 MG tablet Take 1 tablet (10 mg total) by mouth daily. 30 tablet 11     pediatric multivitamin-iron (POLY-VI-SOL WITH IRON) chewable tablet Chew 2 tablets daily.        scopolamine (TRANSDERM-SCOP) 1.5 mg (1 mg over 3 days) transdermal patch Place 1 patch on the skin every third day. 4 patch 0     timolol maleate (TIMOPTIC) 0.5 % ophthalmic solution Administer 1 drop into the left eye at bedtime. Pre glaucoma       nitrofurantoin, macrocrystal-monohydrate, (MACROBID) 100 MG capsule Take 1 capsule (100 mg total) by mouth 2 (two) times a day for 7 days. 14 capsule 0     ursodiol (ACTIGALL) 300 mg capsule Take 1 capsule (300 mg total) by mouth 2 (two) times a day. Start 2 wks after surgery. Do not open capsule. Swallow whole with warm water. 180 capsule 1     No current facility-administered medications for this visit.

## 2021-06-13 NOTE — PROGRESS NOTES
[facility] Follow Up Visit for Previous Bariatric Surgery   Date of visit: 10/24/2017  Physician: Dorothy Millan MD  Primary Care is Carmen Ryan MD.  Evita Vasquez   52 y.o.  female    Date of Surgery: 3/13/2017  Initial Weight: 252 pounds  Today's Weight:   Wt Readings from Last 1 Encounters:   10/24/17 184 lb (83.5 kg)     Body mass index is 28.6 kg/(m^2).  Initial Weight: 252 lbs  Weight: 184 lb (83.5 kg)  Weight loss from initial: 68  % Weight loss: 26.98 %     Assessment and Plan   Assessment: Evita is a 52 y.o. year old female who is 6 months s/p  Sleeve Gastrectomy with Dr. Prado.  They have had a durable weight loss of 66 lbs since surgery.  Overall compliance with the [facility]ariatric Surgery Program has been excellent.    Evita Vasquez feels that she has achieved the goal(s) identified pre-operatively.      Plan:    1. Postoperative malabsorption  Patient is taking all vitamins as recommended.  Recent labs were reviewed and were normal.    2. Overweight  Patient was congratulated on her success thus far.  She will be meeting with a counselor to help her deal with all of the recent loss she has witnessed.  Hopefully this will help remove the unhealthy habits which have slipped back in.          >25 min spent with patient, >50 % spent in counseling and coordination of care     Bariatric Surgery Review   Interim History/LifeChanges: Pt has been under a great deal of stress. 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 She is not feeling safe at work.  Her brother  this fall.  Her friend was diagnosed with breast cancer.  Another acquaintance also .  She recently had a LEAP procedure. She is finding that she is going back to unhealthy eating.     Patient Concerns: Unhealthy habits are creeping back in    Medication changes: none    Vitamin Intake:   Multivitamin   2 with iron   Vitamin D  5000 IU per day   Calcium  citrate   Vit. B-12    SL      Habits:            Alcohol Intake  wine, a couple of glasses a week, turning to in stress   NSAID Use  none   Caffeine Use  1 cup black tea   Exercise  was exercising 7 days a week until September, she is riding a bike occasionally, she sometimes gets 10,000 steps per day   CPAP Use:  na   Birth Control  na   Tobacco Use     no            Symptoms  Hair Loss: Yes  Reactive Hypoglycemia: No  Abdominal Pain: No  Nausea: No  Heartburn: No  Constipation: Yes  Diarrhea: No  Trouble Breathing or Chest Pain: No  Leg Swelling: No  Skin rashes under folds: No                         LABS: reviewed      LABS:  Lab Results   Component Value Date    WBC 6.8 10/17/2017    HGB 14.5 10/17/2017    HCT 43.7 10/17/2017    MCV 96 10/17/2017     10/17/2017      Lab Results   Component Value Date    CPUGHCMW54UT 55.4 10/17/2017    Lab Results   Component Value Date    HGBA1C 5.5 10/17/2017      Lab Results   Component Value Date    CHOL 230 (H) 10/17/2017    Lab Results   Component Value Date    PTH 59 10/17/2017         Lab Results   Component Value Date    FERRITIN 46 10/17/2017      Lab Results   Component Value Date    HDL 59 10/17/2017      Lab Results   Component Value Date    APHIQXVV84 >2000 (H) 10/17/2017    Lab Results   Component Value Date    12127 134 10/17/2017      Lab Results   Component Value Date    LDLCALC 135 (H) 10/17/2017    Lab Results   Component Value Date    TSH 1.50 02/27/2017    Lab Results   Component Value Date    FOLATE 15.8 10/17/2017      Lab Results   Component Value Date    TRIG 181 (H) 10/17/2017    Lab Results   Component Value Date    ALT 12 10/17/2017    AST 19 10/17/2017    ALKPHOS 107 10/17/2017    BILITOT 0.7 10/17/2017    No results found for: TESTOSTERONE     No components found for: CHOLHDL Lab Results   Component Value Date    7597 45.2 10/17/2017      @blair(vitamin a: 1)@             Patient Profile   Social History     Social History Narrative        Past Medical History  "  Past Medical History:   Diagnosis Date     Abnormal Pap smear of cervix      Patient Active Problem List   Diagnosis     Hyperlipidemia     Hypertension     Abnormal Pap Smear Of Cervix     Simple goiter     NAFLD (nonalcoholic fatty liver disease)     Glaucoma     Vitamin D deficiency     Morbid obesity due to excess calories     Type 2 diabetes mellitus     Rhinitis     S/P laparoscopic sleeve gastrectomy     Mitral regurgitation     Current Outpatient Prescriptions   Medication Sig Note     calcium citrate-vitamin D3 250 mg calcium- 200 unit Tab Take 2 tablets by mouth daily.      cetirizine (ZYRTEC) 5 MG tablet Take 5 mg by mouth daily.      cholecalciferol, vitamin D3, 5,000 unit Tab Take 5,000 Units by mouth daily.      clindamycin (CLEOCIN) 300 MG capsule TK 2 CS PO ONE HOUR B DENTAL APPOINTMENT 9/18/2017: Received from: External Pharmacy     cyanocobalamin, vitamin B-12, 2,500 mcg Subl Place 2,500 mcg under the tongue daily.      fluticasone (FLONASE) 50 mcg/actuation nasal spray 2 sprays into each nostril daily as needed.       latanoprost (XALATAN) 0.005 % ophthalmic solution Administer 1 drop to both eyes at bedtime. 10/24/2017: Received from: External Pharmacy Received Sig: INT 1 GTT IN OU EVERY EVENING     lisinopril (PRINIVIL,ZESTRIL) 10 MG tablet Take 1 tablet (10 mg total) by mouth daily.      pediatric multivitamin-iron (POLY-VI-SOL WITH IRON) chewable tablet Chew 2 tablets daily.       scopolamine (TRANSDERM-SCOP) 1.5 mg (1 mg over 3 days) transdermal patch Place 1 patch on the skin every third day.        Past Surgical History  She has a past surgical history that includes Cervical biopsy w/ loop electrode excision (2005); Tonsillectomy; Colposcopy; [place] tooth extraction; lap, samra restrict proc, longitudinal gastrectomy (N/A, 3/13/2017); and Breast biopsy (Left, 2016).     Examination   Resp 18  Ht 5' 7.25\" (1.708 m)  Wt 184 lb (83.5 kg)  LMP 10/17/2017  SpO2 100%  BMI 28.6 " "kg/m2  Height: 5' 7.25\" (1.708 m) (10/24/2017  3:09 PM)  Initial Weight: 252 lbs (10/24/2017  3:09 PM)  Weight: 184 lb (83.5 kg) (10/24/2017  3:09 PM)  Weight loss from initial: 68 (10/24/2017  3:09 PM)  % Weight loss: 26.98 % (10/24/2017  3:09 PM)  BMI (Calculated): 28.6 (10/24/2017  3:09 PM)  SpO2: 100 % (10/24/2017  3:09 PM)  Waist Circumference (In): 40 Inches (10/24/2017  3:09 PM)  Hip Circumference (In): 45 Inches (10/24/2017  3:09 PM)  Neck Circumference (In): 13.5 Inches (10/24/2017  3:09 PM)  General:  Alert and ambulatory,   HEENT:  No conjunctival pallor, moist mucous Membranes, neck is without LAD  Pulmonary:  Normal respiratory effort, no cough, no audible wheezes/crackles.  CV:  Regular rate and Rhythm, no murmurs  Abdominal: Scars well healed, BS normal,soft, NT without rebound or guarding  Extremities: no edema  Skin:  No rashes  Pscyh/Mood: tearful at times         Counseling:   We reviewed the important post op bariatric recommendations:  -eating 3 meals daily  -eating protein first, getting >60gm protein daily  -eating slowly, chewing food well  -avoiding/limiting calorie containing beverages  -drinking water 15-30 minutes before or after meals  -choosing wheat, not white with breads, crackers, pastas, dylan, bagels, tortillas, rice  -limiting restaurant or cafeteria eating to twice a week or less    We discussed the importance of restorative sleep and stress management in maintaining a healthy weight.  We discussed the National Weight Control Registry healthy weight maintenance strategies and ways to optimize metabolism.  We discussed the importance of physical activity including cardiovascular and strength training in maintaining a healthier weight.  We discussed the importance of life-long vitamin supplementation and life-long  follow-up.    Evita was reminded that, to avoid marginal ulcers she should avoid tobacco at all, alcohol in excess, caffeine in excess, and NSAIDS (unless indicated for " cardioprotection or othewise and opposed by a PPI).    CLEM Millan MD  [facility].  10/24/2017  3:32 PM

## 2021-06-13 NOTE — PROGRESS NOTES
ASSESSMENT and PLAN:  1. Hyperlipidemia  Not on any medication.  She has a strong family history of heart disease.  She will be getting blood work done as part of her bariatric evaluation today.    2. Hypertension  Controlled on lisinopril.  Labs today.    3. NAFLD (nonalcoholic fatty liver disease)  She has been losing weight.  She will be getting liver function tests today as part of her bariatric evaluation.    4. Type 2 diabetes mellitus  Certain degree of control.  Stress is negatively affecting her.  We will check labs today as part of her bariatric evaluation.  I am also referring her to therapy to help her cope with all of her major life changes.    5. Vitamin D deficiency  Labs will be done today    6. Numbness and tingling in right hand  She has very prominent thenar atrophy on the right hand, bony prominences are more notable here, she has weakness of the intrinsic hand muscles. we will refer her to hand specialty at [place].  - Ambulatory referral to Orthopedics    7. Encounter to establish care with new doctor  We reviewed her history and family history today    8. Grief reaction  She has had significant stressors in her life.  She is open to therapy.  Referral is made  - Ambulatory referral to Psychology    9. S/P gastric surgery  She is struggling with all the adjustments related to her surgery as well as the grief in her life.  Will refer her to therapy  - Ambulatory referral to Psychology    Labs done today  - Vitamin A  - Folate, Serum  - Zinc, Serum  - Vitamin D, Total (25-Hydroxy)  - Parathyroid Hormone Intact  - HM2(CBC w/o Differential)  - Vitamin B12  - Ferritin  - Comprehensive Metabolic Panel  - Lipid Profile  - Glycosylated Hemoglobin A1c  - Thiamin (Vitamin B1), Whole Blood      There are no discontinued medications.    No Follow-up on file.    Patient Instructions   We'll do our best to get blood today.  After labs, please meet with our specialty .  Plan on a follow up with me in  3-6 months; earlier as needed.  Take care!      CHIEF COMPLAINT:  Chief Complaint   Patient presents with     [facility]       HISTORY OF PRESENT ILLNESS:  Evita Vasquez is a 52 y.o. female  presenting to the clinic today for an establish care visit.  She's following up on her DM2 as well.  She had an eye exam last week.  She is diet managed.  She's had improved control following her gastric bypass and weight loss.    She has HTN.  That has been well controlled on lisinopril.    She has a history of hyperlipidemia.  She is not currently on medication for that.    She has some right hand numbness and muscle atrophy.  That has been present over about 20 years.  She has discussed this with providers in the past and an EMG has been recommended multiple times.  Unfortunately, she has a needle phobia and is not able to do an EMG.  She does have numbness that will wake her up at night.    She has been dealing with a lot of grief in the past year.  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  She lost an aunt within the last week.  Her brother  last night at age 56.  He had been on dialysis for about 8 years and made a decision on  to stop dialysis as well as insulin for his diabetes.  He was in pain, tired and his wife had moved on.  As result, she is now craving comfort food.  Her sleep has been interrupted.  She feels that now might be a good time to look into counseling.    She has a history of mild regurgitation and 2 heart valves.  This was evaluated when she was at a [facility].  She had 2 echocardiograms and 1 a Cardiolite test.  She said everything has been stable there.  She does have a very strong family history of cardiac disease.    REVIEW OF SYSTEMS:   : UTI sx resolved  Eyes: Blurry vision  HEENT: Ringing in ears, deafness  GI: Loss of appetite, special diet, constipation, gaseousness  GYN: Heavy periods, becoming irregular   all  "other systems are negative.    Family History   Problem Relation Age of Onset     Heart attack Father      Heart attack Brother      56     Diabetes Brother      Heart disease Brother      Dialysis Brother      Cirrhosis Mother      Hypertension Mother      Diabetes type II Mother      Thyroid disease Sister      Diabetes Brother      Heart disease Brother      Diabetes Brother      Heart disease Brother          TOBACCO USE:  History   Smoking Status     Former Smoker     Types: Cigarettes   Smokeless Tobacco     Never Used       VITALS:  Vitals:    10/17/17 0813   BP: 132/74   Patient Site: Right Arm   Pulse: 60   Weight: 183 lb 5 oz (83.2 kg)   Height: 5' 7.25\" (1.708 m)     Wt Readings from Last 3 Encounters:   10/17/17 183 lb 5 oz (83.2 kg)   10/04/17 185 lb 8 oz (84.1 kg)   09/18/17 185 lb (83.9 kg)         PHYSICAL EXAM:  Constitutional:  Reveals an alert, pleasant adult female.   Vitals:  Noted.   Psych: Tearful at times, appropriately so when discussing family  Feet: No lesions on the plantar aspect of either foot.    QUALITY MEASURES:  The following high BMI interventions were performed this visit: weight monitoring    MEDICATIONS:  Current Outpatient Prescriptions   Medication Sig Dispense Refill     calcium carbonate (OS-OBED) 500 mg calcium (1,250 mg) chewable tablet Chew 2 tablets daily.        cetirizine (ZYRTEC) 5 MG tablet Take 5 mg by mouth daily.       cholecalciferol, vitamin D3, 5,000 unit Tab Take 5,000 Units by mouth daily.       clindamycin (CLEOCIN) 300 MG capsule TK 2 CS PO ONE HOUR B DENTAL APPOINTMENT  0     cyanocobalamin, vitamin B-12, 2,500 mcg Subl Place 2,500 mcg under the tongue daily.       fluticasone (FLONASE) 50 mcg/actuation nasal spray 2 sprays into each nostril daily as needed.        lisinopril (PRINIVIL,ZESTRIL) 10 MG tablet Take 1 tablet (10 mg total) by mouth daily. 30 tablet 11     pediatric multivitamin-iron (POLY-VI-SOL WITH IRON) chewable tablet Chew 2 tablets daily.   "      scopolamine (TRANSDERM-SCOP) 1.5 mg (1 mg over 3 days) transdermal patch Place 1 patch on the skin every third day. 4 patch 0     timolol maleate (TIMOPTIC) 0.5 % ophthalmic solution Administer 1 drop into the left eye at bedtime. Pre glaucoma       vit B comp no.3-folic-C-biotin (NEPHRO-ANA) 1- mg-mg-mcg Tab tablet Take 1 tablet by mouth daily.       ursodiol (ACTIGALL) 300 mg capsule Take 1 capsule (300 mg total) by mouth 2 (two) times a day. Start 2 wks after surgery. Do not open capsule. Swallow whole with warm water. 180 capsule 1     No current facility-administered medications for this visit.

## 2021-06-13 NOTE — PROGRESS NOTES
ASSESSMENT:  1. Mitral regurgitation  Patient with a known history of mitral regurgitation, she is asymptomatic, and has no functional limitations.  Previous evaluation by cardiology.    2. Preop examination  For a LEEP procedure secondary to a history of abnormal Pap testing.  - Basic Metabolic Panel  - HM2(CBC w/o Differential)    PLAN:  1.  CBC and basic metabolic profile reviewed, results normal.  2.  Patient is cleared for surgery.    Orders Placed This Encounter   Procedures     Influenza, Seasonal,Quad Inj, 36+ MOS     Basic Metabolic Panel     HM2(CBC w/o Differential)     There are no discontinued medications.    No Follow-up on file.    Patient approved for surgery with general or local anesthesia.     CHIEF COMPLAINT:  Chief Complaint   Patient presents with     Pre-op Exam     fax#[phone]  surgery ctr            Subjective:     Scheduled Procedure: LEEP   Surgery Date:  9-20-17  Surgery Location:  surgical suite   Surgeon:  Dr. Chika Shi  Recovery Plan: going home after surgery     Evita is a 52 y.o. female here for an internal medicine pre-operative consultation. The exam is requested by Dr. Shi in preparation for Loop Electrosurgery Excision Procedure to be performed at  Surgical Suite on 9/20/17. Today s examination on 9/19/2017 is done to review the underlying surgical condition of patient comorbidities, clear for anesthesia, and review medical problems with appropriate changes in medications.     Evita has tolerated previous surgeries well without bleeding or anesthesia difficulty. She had a LEEP procedure performed in 2005. Since then, she and Dr. Shi have been monitoring her Pap smears of which they have been normal for years. Recently, they have started to show up as abnormal and showing pre-cancerous cells. She has been opting for colposcopy in the past. She is not quite ready for a hysterectomy however Dr. Sih would like to be more aggressive. As a result,  they have decided to pursue the LEEP procedure once again. It is unclear how her Pap smear has begun to become abnormal once again, though her gynecologist believes her  could be a carrier of HPV.     Hypertension: Her blood pressure has been well-controlled with the use of 10 mg lisinopril.     Health Maintenance: She will receive the influenza vaccine today.     Current Outpatient Prescriptions   Medication Sig Dispense Refill     calcium carbonate (OS-OBED) 500 mg calcium (1,250 mg) chewable tablet Chew 1 tablet daily.       cetirizine (ZYRTEC) 5 MG tablet Take 5 mg by mouth daily.       cholecalciferol, vitamin D3, 5,000 unit Tab Take 5,000 Units by mouth daily.       clindamycin (CLEOCIN) 300 MG capsule TK 2 CS PO ONE HOUR B DENTAL APPOINTMENT  0     cyanocobalamin, vitamin B-12, 2,500 mcg Subl Place 2,500 mcg under the tongue daily.       fluticasone (FLONASE) 50 mcg/actuation nasal spray 2 sprays into each nostril daily as needed.        lisinopril (PRINIVIL,ZESTRIL) 10 MG tablet Take 1 tablet (10 mg total) by mouth daily. 30 tablet 11     pediatric multivitamin-iron (POLY-VI-SOL WITH IRON) chewable tablet Chew 1 tablet daily.        scopolamine (TRANSDERM-SCOP) 1.5 mg (1 mg over 3 days) transdermal patch Place 1 patch on the skin every third day. 4 patch 0     timolol maleate (TIMOPTIC) 0.5 % ophthalmic solution Administer 1 drop to both eyes bedtime. Pre glaucoma       ursodiol (ACTIGALL) 300 mg capsule Take 1 capsule (300 mg total) by mouth 2 (two) times a day. Start 2 wks after surgery. Do not open capsule. Swallow whole with warm water. 180 capsule 1     No current facility-administered medications for this visit.      Allergies   Allergen Reactions     Mold Other (See Comments)     congestion     Penicillins Hives     Childhood         Immunization History   Administered Date(s) Administered     Influenza, Seasonal, Inj PF 10/01/2011     Influenza, inj, historic 11/10/2008, 10/20/2009, 10/26/2010,  10/08/2013, 10/21/2014, 11/01/2016     Influenza, seasonal,quad inj 36+ mos 09/28/2015, 09/18/2017     Influenza, seasonal,quad inj 6-35 mos 12/07/2012     Td, historic 10/19/2000     Tdap 03/07/2011       Patient Active Problem List   Diagnosis     Hyperlipidemia     Hypertension     Abnormal Pap Smear Of Cervix     Simple goiter     NAFLD (nonalcoholic fatty liver disease)     Glaucoma     Vitamin D deficiency     Morbid obesity due to excess calories     Type 2 diabetes mellitus     Rhinitis     S/P laparoscopic sleeve gastrectomy     Mitral regurgitation       Social History     Social History     Marital status:      Spouse name: N/A     Number of children: 1     Years of education: N/A     Occupational History     HS       Social History Main Topics     Smoking status: Former Smoker     Types: Cigarettes     Smokeless tobacco: Never Used     Alcohol use Yes      Comment: occasional, weekends, with meals. Wine     Drug use: No     Sexual activity: Yes     Partners: Male     Other Topics Concern     Not on file     Social History Narrative       Past Surgical History:   Procedure Laterality Date     BREAST BIOPSY Left 2016     CERVICAL BIOPSY  W/ LOOP ELECTRODE EXCISION  2005     COLPOSCOPY       WA LAP, EARLE RESTRICT PROC, LONGITUDINAL GASTRECTOMY N/A 3/13/2017    Procedure: LAPAROSCOPIC SLEEVE GASTRECTOMY;  Surgeon: Tejas Prado MD;  Location: [facility];  Service: General     TONSILLECTOMY       WISDOM TOOTH EXTRACTION           Family History   Problem Relation Age of Onset     Heart attack Father      Heart attack Brother      Diabetes Brother      Heart disease Brother      Cirrhosis Mother      Hypertension Mother      Diabetes type II Mother      No Medical Problems Sister      Diabetes Brother      Heart disease Brother      Diabetes Brother      Heart disease Brother        History of Present Illness  Recent Health  Fever: no  Chills: no  Fatigue: no  Chest Pain:  no  Cough: no  Dyspnea: no  Urinary Frequency: no  Nausea:no   Vomiting: no  Diarrhea: no  Abdominal Pain: no  Easy Bruising: no  Lower Extremity Swelling:no   Poor Exercise Tolerance:no       Pertinent History  Prior Anesthesia: no  Previous Anesthesia Reaction:  no  Diabetes:yes     Cardiovascular Disease:no   Pulmonary Disease: no  Renal Disease:no   GI Disease: no  Sleep Apnea: no  Thromboembolic Problems: no  Clotting Disorder:no   Bleeding Disorder:no   Transfusion Reaction: no  Impaired Immunity:no    Steroid use in the last 6 months: no   Frequent Aspirin use: no      Review of Systems  Review of Systems   Constitutional: Negative  HENT: Negative  Eyes:Negative   Respiratory:  Negative   Cardiovascular: Negative  Gastrointestinal:  Negative    Endocrine: Negative   Genitourinary:  Negative  Musculoskeletal: Negative  Skin: Negative   Neurological: Negative   Hematological:  Negative  Psychiatric/Behavioral: Negative      Objective:      Vitals:    09/18/17 1443   BP: 118/80   Pulse: (!) 54   Resp: 12   Temp: 98  F (36.7  C)   SpO2: 99%     Wt Readings from Last 3 Encounters:   09/18/17 185 lb (83.9 kg)   06/22/17 194 lb 11.2 oz (88.3 kg)   06/19/17 199 lb (90.3 kg)     BP Readings from Last 3 Encounters:   09/18/17 118/80   06/22/17 124/72   06/08/17 148/75     Body mass index is 29.41 kg/(m^2).      Physical Exam:  Constitutional: she appears well-developed and well-nourished.   HENT:   Right Ear: External ear normal.   Left Ear: External ear normal.   Nose: Nose normal.   Mouth/Throat: Oropharynx is clear and moist.   Eyes: Conjunctivae and EOM are normal. Pupils are equal, round, and reactive to light. Right eye exhibits no discharge. Left eye exhibits no discharge.   Neck: No thyromegaly present.   Cardiovascular: Normal rate, regular rhythm and normal heart sounds. No murmur heard.  Pulmonary/Chest: Effort normal and breath sounds normal.   Abdominal: Soft. Bowel sounds are normal. He exhibits no  distension and no mass. There is no tenderness. There is no rebound and no guarding.   Musculoskeletal: Normal range of motion.   No joint swelling or deformity.   Lymphadenopathy:     She has no cervical adenopathy.   Neurological: she is alert. she has normal reflexes.   Skin: Skin is warm and dry. No rash noted.   Psychiatric: she has a normal mood and affect.       DATA REVIEWED:  Additional History from Old Records Summarized (2): None.   Decision to Obtain Records (1): None.  Radiology Tests Summarized or Ordered (1): None.  Labs Reviewed or Ordered (1): Reviewed labs 5/8/17; A1c. Ordered labs; CBC, basic metabolic profile.   Medicine Test Summarized or Ordered (1): None.  Independent Review of EKG, X-RAY, or RAPID STREP (2 each): None.     The visit lasted a total of 21 minutes face to face with the patient. Over 50% of the time was spent counseling and educating the patient about preparation for surgery.    IJerzy, am scribing for and in the presence of, Dr. Peter.    I, Dr. Peter, personally performed the services described in this documentation, as scribed by Jerzy Orta in my presence, and it is both accurate and complete.    Total data points: 1

## 2021-06-13 NOTE — TELEPHONE ENCOUNTER
RN cannot approve Refill Request    RN can NOT refill this medication PCP messaged that patient is overdue for Labs. Last office visit: Visit date not found Last Physical: 9/18/2017 Last MTM visit: Visit date not found Last visit same specialty: 9/5/2019 Mary Kate Duncan NP.  Next visit within 3 mo: Visit date not found  Next physical within 3 mo: Visit date not found      Vero Angel, [facility] Triage/Med Refill 11/22/2020    Requested Prescriptions   Pending Prescriptions Disp Refills     metFORMIN (GLUCOPHAGE-XR) 500 MG 24 hr tablet [Pharmacy Med Name: METFORMIN ER 500MG 24HR TABS] 90 tablet 0     Sig: TAKE 1 TABLET(500 MG) BY MOUTH DAILY WITH BREAKFAST       Metformin Refill Protocol Failed - 11/22/2020  3:04 PM        Failed - Blood pressure in last 12 months     BP Readings from Last 1 Encounters:   09/05/19 128/80             Failed - LFT or AST or ALT in last 12 months     Albumin   Date Value Ref Range Status   08/02/2019 4.1 3.5 - 5.0 g/dL Final     Bilirubin, Total   Date Value Ref Range Status   08/02/2019 0.3 0.0 - 1.0 mg/dL Final     Bilirubin, Direct   Date Value Ref Range Status   08/02/2019 0.1 <=0.5 mg/dL Final     Alkaline Phosphatase   Date Value Ref Range Status   08/02/2019 88 45 - 120 U/L Final     AST   Date Value Ref Range Status   08/02/2019 52 (H) 0 - 40 U/L Final     ALT   Date Value Ref Range Status   08/02/2019 46 (H) 0 - 45 U/L Final     Protein, Total   Date Value Ref Range Status   08/02/2019 7.6 6.0 - 8.0 g/dL Final                Passed - GFR or Serum Creatinine in last 6 months     GFR MDRD Non Af Amer   Date Value Ref Range Status   08/12/2020 >60 >60 mL/min/1.73m2 Final     GFR MDRD Af Amer   Date Value Ref Range Status   08/12/2020 >60 >60 mL/min/1.73m2 Final             Passed - Visit with PCP or prescribing provider visit in last 6 months or next 3 months     Last office visit with prescriber/PCP: Visit date not found OR same dept: Visit date not found OR same  specialty: 9/5/2019 Mary Kate Duncan NP Last physical: Visit date not found Last MTM visit: Visit date not found         Next appt within 3 mo: Visit date not found  Next physical within 3 mo: Visit date not found  Prescriber OR PCP: Thomas Peter MD  Last diagnosis associated with med order: 1. Type 2 diabetes mellitus without complication, without long-term current use of insulin (H)  - metFORMIN (GLUCOPHAGE-XR) 500 MG 24 hr tablet [Pharmacy Med Name: METFORMIN ER 500MG 24HR TABS]; TAKE 1 TABLET(500 MG) BY MOUTH DAILY WITH BREAKFAST  Dispense: 90 tablet; Refill: 0     If protocol passes may refill for 12 months if within 3 months of last provider visit (or a total of 15 months).           Passed - A1C in last 6 months     Hemoglobin A1c   Date Value Ref Range Status   08/12/2020 6.9 (H) <=5.6 % Final     Comment:     Normal <5.7% Prediabete 5.7-6.4% Diabletes 6.5% or higher - adopted from ADA consensus guidelines               Passed - Microalbumin in last year      Microalbumin, Random Urine   Date Value Ref Range Status   08/12/2020 3.14 (H) 0.00 - 1.99 mg/dL Final

## 2021-06-13 NOTE — TELEPHONE ENCOUNTER
Refill Approved    Rx renewed per Medication Renewal Policy. Medication was last renewed on 6/11/20 vv.    Nadya Malik, [facility] Triage/Med Refill 12/17/2020     Requested Prescriptions   Pending Prescriptions Disp Refills     lisinopriL (PRINIVIL,ZESTRIL) 40 MG tablet [Pharmacy Med Name: LISINOPRIL 40MG TABLETS] 90 tablet 3     Sig: TAKE 1 TABLET(40 MG) BY MOUTH DAILY       Ace Inhibitors Refill Protocol Failed - 12/16/2020  3:10 AM        Failed - Blood pressure filed in past 12 months     BP Readings from Last 1 Encounters:   09/05/19 128/80             Passed - PCP or prescribing provider visit in past 12 months       Last office visit with prescriber/PCP: 9/5/2019 Mary Kate Duncan NP OR same dept: Visit date not found OR same specialty: 9/5/2019 Mary Kate Duncan NP  Last physical: Visit date not found Last MTM visit: Visit date not found   Next visit within 3 mo: Visit date not found  Next physical within 3 mo: Visit date not found  Prescriber OR PCP: Mary Kate Duncan NP  Last diagnosis associated with med order: 1. Essential hypertension with goal blood pressure less than 130/80  - lisinopriL (PRINIVIL,ZESTRIL) 40 MG tablet [Pharmacy Med Name: LISINOPRIL 40MG TABLETS]; TAKE 1 TABLET(40 MG) BY MOUTH DAILY  Dispense: 90 tablet; Refill: 3    If protocol passes may refill for 12 months if within 3 months of last provider visit (or a total of 15 months).             Passed - Serum Potassium in past 12 months     Lab Results   Component Value Date    Potassium 4.6 08/12/2020             Passed - Serum Creatinine in past 12 months     Creatinine   Date Value Ref Range Status   08/12/2020 0.66 0.60 - 1.10 mg/dL Final

## 2021-06-14 NOTE — PROGRESS NOTES
FEMALE PREVENTATIVE EXAM    Assessment and Plan:   Patient has been advised of split billing requirements and indicates understanding: Yes    1. Routine health maintenance  - Tdap vaccine,  8yo or older,  IM  - Hepatitis C Antibody (Anti-HCV)    2. Alcohol abuse  Good insight regarding struggles with alcohol.  Currently abstinent.  Will refer for mental health counseling and treatment.  Encouraged continued abstinence.   - AMB REFERRAL TO MENTAL HEALTH AND ADDICTION  - Adult (18+); Outpatient Treatment; Chemical Dependency Treatment;  [facility] [phone];  Mental Illness and Chemical Dependency Outpatient; We will contact you to sched...    3. Type 2 diabetes mellitus without complication, without long-term current use of insulin (H)  Not currently on Metformin.  Will repeat labs today.  - Glycosylated Hemoglobin A1c  - Comprehensive Metabolic Panel  -  DIABETES FOOT EXAM    4. Screen for colon cancer  - Ambulatory referral for Colonoscopy - [facility]    5. Hyperlipidemia, unspecified hyperlipidemia type  - Lipid Cascade FASTING    6. Glaucoma, unspecified glaucoma type, unspecified laterality  Follows with ophthalmology.    7. Morbid obesity (H)  Weight is stable post gastric bypass.    8. Essential hypertension with goal blood pressure less than 130/80  Well controlled.  Continue Lisinopril.  - lisinopriL (PRINIVIL,ZESTRIL) 40 MG tablet; Take 1 tablet (40 mg total) by mouth daily.  Dispense: 90 tablet; Refill: 3     Next follow up:  Return in about 6 months (around 7/7/2021).    Immunization Review  Adult Imm Review: Declines immunizations today    I discussed the following with the patient:   Adult Healthy Living: Importance of regular exercise  Healthy nutrition  Weight loss referral options  Getting adequate sleep  Stress management      Subjective:   Chief Complaint: Evita Vasquez is an 55 y.o. female here for a preventative health visit.    Patient has been advised of  split billing requirements and indicates understanding: Yes    HPI: Patient finished her masters degree since I saw her last.  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    On lisinopril for hypertension.  Patient is not checking blood pressures at all at home.  Denies any chest pain, shortness of breath, dizziness/lightheadedness, or lower extremity swelling.    History of hysterectomy due to cervical dysplasia.  She is no longer having Pap exams.  She does have some pain with intercourse.  No vaginal bleeding or discharge.    History of glaucoma.  Following with an ophthalmologist every 3 to 6 months.    History of type 2 diabetes.  Last hemoglobin A1c was 6.9.  Patient was prescribed Metformin, but never took this due to concerns regarding her liver.    Patient's main concern today is regards to her alcohol use.  This has been a concern for her in the past as well.  She has been using alcohol to relax and to reduce stress, especially in the evenings.  She had been drinking red wine every single evening, up to 1-2 bottles.  She has been abstinent for the past week.  She has experienced some nausea, headaches, decreased appetite, and night sweats since stopping.  She is not having any alcohol cravings.  She did try to contact her previous counselor, but she is no longer working.  Patient is willing to seek help and is wanting a referral today.  She denies any other substance use.    Healthy Habits  Are you taking a daily aspirin? No  Do you typically exercising at least 40 min, 3-4 times per week?  NO  Do you usually eat at least 4 servings of fruit and vegetables a day, include whole grains and fiber and avoid regularly eating high fat foods? NO  Have you had an eye exam in the past two years? Yes  Do you see a dentist twice per year? Yes  Do you have any concerns regarding sleep? No    Safety Screen  If you own firearms, are they secured in a locked gun cabinet or with trigger locks? Yes  Do you feel you are  "safe where you are living?: Yes (1/7/2021  8:23 AM)  Do you feel you are safe in your relationship(s)?: Yes (1/7/2021  8:23 AM)      Review of Systems:  Please see above.  The rest of the review of systems are negative for all systems.     Pap History:   Status post benign hysterectomy. Health Maintenance and Surgical History updated.  Cancer Screening       Status Date      MAMMOGRAM Next Due 12/21/2021      Done 12/21/2020 MAMMO SCREENING BILATERAL     Patient has more history with this topic...    PAP SMEAR This plan is no longer active.      Done 8/24/2017      Patient has more history with this topic...          Patient Care Team:  Mary Kate Duncan NP as PCP - General (Family Medicine)  Mary Kate Duncan NP as Assigned PCP        History     Reviewed By Date/Time Sections Reviewed    Mary Kate Duncan NP 1/7/2021  8:32 AM Tobacco    Mary Kate Duncan NP 1/7/2021  8:28 AM Tobacco    Мария Sepulveda MA 1/7/2021  8:24 AM Tobacco            Objective:   Vital Signs:   Visit Vitals  /70   Pulse 74   Ht 5' 7\" (1.702 m)   Wt 210 lb 4.8 oz (95.4 kg)   LMP 06/07/2018   BMI 32.94 kg/m           PHYSICAL EXAM  General Appearance: Alert, cooperative, no distress, appears stated age  Head: Normocephalic, without obvious abnormality, atraumatic  Eyes: PERRL, conjunctiva/corneas clear, EOM's intact  Ears: Normal TM's and external ear canals, both ears  Nose: Nares normal, septum midline,mucosa normal, no drainage  Throat: Lips, mucosa, and tongue normal; teeth and gums normal  Neck: Supple, symmetrical, trachea midline, no adenopathy;  thyroid: not enlarged, symmetric, no tenderness/mass/nodules  Lungs: Clear to auscultation bilaterally, respirations unlabored  Breasts: Declines  Heart: Regular rate and rhythm, S1 and S2 normal, no murmur, rub, or gallop  Abdomen: Soft, non-tender, bowel sounds active all four quadrants, no masses, no organomegaly  Extremities: Extremities normal, atraumatic, no cyanosis or " edema  Skin: Skin color, texture, turgor normal, no rashes or lesions  Lymph nodes: Cervical, supraclavicular nodes normal  Neurologic: Normal   Psychologic: appropriate affective, answers all of my questions appropriately. No hallucinations, delusion, or suicidal ideations.    Mary Kate Duncan, NP

## 2021-06-15 PROBLEM — E11.9 TYPE 2 DIABETES MELLITUS (H): Status: ACTIVE | Noted: 2017-02-27

## 2021-06-15 PROBLEM — E66.01 MORBID OBESITY DUE TO EXCESS CALORIES (H): Status: ACTIVE | Noted: 2017-02-27

## 2021-06-15 PROBLEM — J31.0 RHINITIS: Status: ACTIVE | Noted: 2017-02-27

## 2021-06-15 PROBLEM — Z98.84 S/P LAPAROSCOPIC SLEEVE GASTRECTOMY: Status: ACTIVE | Noted: 2017-03-13

## 2021-06-16 PROBLEM — I34.0 MITRAL REGURGITATION: Status: ACTIVE | Noted: 2017-09-18

## 2021-06-16 NOTE — PROGRESS NOTES
Assessment/Plan:     1. Elevated LFTs  Suspect this is secondary to alcohol use and history of fatty liver.  Will recheck.  Consider repeat liver US to rule out other cause.  Recheck in 3 months.  - Comprehensive Metabolic Panel    2. Type 2 diabetes mellitus without complication, without long-term current use of insulin (H)  Hgb A1c is 6.2.  Will remain off of Metformin at this time due to elevated LFTs.  Recheck in 3 months.   - Glycosylated Hemoglobin A1c    3. Dermatitis  Recommend changing deodorant.  - triamcinolone (KENALOG) 0.1 % cream; Apply topically to affected areas (axilla) twice daily.  Do not use longer than 14 days.  Dispense: 30 g; Refill: 0    4. Alcohol abuse  Continues to work on this with her therapist.  Anticipates starting AA.         Subjective:     Evita Vasquez is a 55 y.o. female who presents for a recheck.  Patient had a full physical in January.  Her liver enzymes were elevated at that visit.  She has a history of chronic alcohol use and has restarted seeing her therapist.  She was doing well with alcohol abstinence, but her  was diagnosed with colon cancer and she started drinking more wine in the evening.  States she was feeling really good with abstinence and walking on a daily basis.  She denies any abdominal pain or N/V.    History of Type 2 Diabetes.  Currently on Metformin due to elevated LFTs.  She is not checking blood sugars.    Complains of a rash in her bilateral axilla.  Rash is itching.  Believes it may be associated with her deodorant, as she has gotten this before.       The following portions of the patient's history were reviewed and updated as appropriate: allergies, current medications.    Review of Systems  A comprehensive review of systems was performed and was otherwise negative    Objective:     /80   Pulse 74   Wt 211 lb 14.4 oz (96.1 kg)   LMP 06/07/2018   BMI 33.19 kg/m      General Appearance: Alert, cooperative, no distress, appears stated  age  Skin: erythematous, thickened rash to the bilateral axilla      Mary Kate Duncan NP

## 2021-06-16 NOTE — PROGRESS NOTES
"Post-op Surgical Weight Loss Diet Evaluation     Assessment:  Pt presents for 1 year post-op RD visit, s/p LSG on 3/13/17 with Dr. Prado. Today we reviewed current eating habits and level of physical activity, and instructed on the changes that are required for successful bariatric outcomes.    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 Pt feels like her old dietary habits are \"creeping back in\", such as snacking between meals. Otherwise, pt reports tolerating foods and fluids well.     Pt's Initial Weight: 252 lbs  Weight: 184 lb (83.5 kg)  Weight loss from initial: 68  % Weight loss: 26.98 %    Body mass index is 28.82 kg/(m^2).     Concerns: snacking between meals; no exercise routine established; inconsistent vitamin intake.      Diabetes  Testing Blood Sugars: no   If so, how often? N/a     Vitamins   Multi Vit with Iron: yes- getting sick of [place] (forgots to take 2nd in the afternoon)    Calcium Citrate: yes- occasionally forgets to take second dose   B12: yes  D3: yes  Biotin     Do you experience hunger? Yes between meals   Do you have \"dumping\" syndrome? no    How often?no   With what foods: none   Nausea: no  Vomiting: no  Diarrhea: no  Constipation: yes- pt reports improving with increased fiber intake   Hair loss:yes- improving     Diet Recall/Time:   Breakfast: greek yogurt, sips of protein shake (15g)   Am Snack: handful of almonds or sips of protein shake   Lunch: chicken, broccoli with cheese (20g)   Pm snack: sips of protein shake or humus     Dinner: chicken, frozen vegetables or mashed potatoes (20g)   HS Snack: almonds or wheat thins with humus or handful of popcorn     Estimated protein intake: 55-65 " "grams    Estimated portion size per meals:3/4 cup/meal    Incorporation of vegetables, fruits, carbohydrates into diet/meals using   Bariatric \"Plate Method\"   The patient and I discussed the importance of including lean/low fat protein at each meal, including a vegetable/fruit, and limiting carbohydrate intake to less than 25% of plate volume. Always keeping within approved perimeters of post op meal portion sizes according to 3/6/9/12 months post op guidelines.    Healthy Fats: almonds, avocado     Meals per week away from home:   Recommended limiting eating out to no more than 2x/week.    Meal Duration:15 minutes  Encouraged slowing meal times down, 20-30 minutes, chewing to applesauce consistency.     Fluid-meal separation:  Fluids are  30min before and 30 minutes after meals.  The patient and I reviewed the anatomy of the bypass and why  fluids from a meal is so important.    Fluid Intake  Water: 64 oz   Caffeine: 1 cup tea   Alcohol: none   Carbonation: none   Milk: none   Juice: none     Discussed the importance of adequate hydration after surgery and the goal of 64+ oz of fluid daily.   The patient understands the importance of avoiding all carbonated, caffeinated, and sweetened drinks; and instead choosing  64oz plain water.    Exercise  Nothing routine established. Pt reports cancelling her gym membership last summer and has been trying to exercise more with daughter.     Pt's understands that 30-60 minutes of daily activity is an important part of bariatric surgery success.   Encouraged pt to incorporate strength training exercise along with cardiovascular exercise as well, most days of the week.      PES statement:    1. (NB-1.7) Undesirable food choices related to Failure to adjust for lifestyle changes as evidenced by low protein intake at meals; requent grazing;  mindless eating  and no structured activity regimen.    Intervention    Discussion  1. Discussed 1 year  Post-Op " "Nutritional Guidelines for LSG  2. Recommended to consume 15-20gm protein at 3 meals daily, along with protein supplement/\"planned protein containing snack\" of 15-30gm protein, to reach goal of 60-80 gm protein daily.  3. Educated on post-op vitamin regimen: Multi Vit + iron 2x/day, calcium citrate 400-600 mg 2x/day, 8280-0029 mcg of Sublingual B-12 daily, and 5000 IU Vitamin D3 daily (MVI and calcium can be taken at the same time BID)  4. Discussed benefits of developing exercise routine   5. Bariatric Plate Method-  including lean/low fat protein at each meal, including a vegetable/fruit, and limiting carbohydrate intake to less than 25% of plate volume. Approved perimeters of post op meal portion sizes according to 12 months post op guidelines.  Instructions  1. Include 15-20gm protein at each meal, along with protein supplement/\"planned protein containing snack\" of 15-30gm protein, to reach goal of 60-80 gm protein daily.  2. Increase fluid intake to 64oz daily: choose plain or calorie/carbonation-free beverages.  3. Incorporate daily structured activity, 30-60 minutes most days of the week  4. Recommended pt to start taking: Multi Vit + iron 2x/day, calcium citrate 400-600 mg 2x/day, 2967-9838 mcg of Sublingual B-12 daily, and 5000 IU Vitamin D3 daily. (MVI and calcium can be taken at the same time)  5. Read food labels more consistently: keeping total fat grams <10, total sugar grams <10, fiber >3gm per serving.  6. Increase vegetable/fruit intake, by having a vegetable or fruit with each meal daily.  7. Practice plate method: 1/2 plate lean/low fat protein source, vegetable/fruit, <25% of plate complex carbohydrates.  8. Separate fluids 30 minutes before/after meal times.  9. Practice eating off of smaller plates/bowls, chewing to applesauce consistency, taking 20-30 minutes to eat in a calm/relaxed environment without distractions of tv/email/cell phone.    Handouts provided:  1 year  Post-Op Nutritional " Guidelines for LSG    Monitor/Evaluation    Pt to follow up for 1 year  post-op visit with bariatrician       Time In: 7:30am  Time Out: 8:05am      ABN signed: Yes

## 2021-06-17 NOTE — TELEPHONE ENCOUNTER
Triage call:    Caller: Patient    Patient had a COVID test at the [facility] vs at [facility] after E visit, as she couldn't get in until Friday.    As the day has progressed, reporting that her breathing is more labored and her temp is rising, also has chills.    Constant wheezing can be heard over the phone and no hx of asthma.         Is also reporting chest pain and pressure, rates pain at 4/10.    Symptoms since Sunday.    Pt was advised of protocol recommendation/disposition of ED    Cindy Rangel RN 05/12/21 4:52 PM   [facility] Nurse Advisor      COVID 19 Nurse Triage Plan/Patient Instructions    Please be aware that novel coronavirus (COVID-19) may be circulating in the community. If you develop symptoms such as fever, cough, or SOB or if you have concerns about the presence of another infection including coronavirus (COVID-19), please contact your health care provider or visit [email or web address].     Disposition/Instructions    ED Visit recommended. Follow protocol based instructions.      Bring Your Own Device:  Please also bring your smart device(s) (smart phones, tablets, laptops) and their charging cables for your personal use and to communicate with your care team during your visit.      Thank you for taking steps to prevent the spread of this virus.  o Limit your contact with others.  o Wear a simple mask to cover your cough.  o Wash your hands well and often.    Resources    [facility]: About COVID-19: [email or web address]    CDC: What to Do If You're Sick: www.cdc.gov/coronavirus/2019-ncov/about/steps-when-sick.html    CDC: Ending Home Isolation: www.cdc.gov/coronavirus/2019-ncov/hcp/disposition-in-home-patients.html     CDC: Caring for Someone: www.cdc.gov/coronavirus/2019-ncov/if-you-are-sick/care-for-someone.html     MD: Interim Guidance for Hospital Discharge to Home: www.health.[facility].[place]/diseases/coronavirus/hcp/hospdischarge.pdf    [facility] clinical trials  (COVID-19 research studies): [email or web address].[email or web address]-clinical-trials     Below are the COVID-19 hotlines at the [place] Department of Health ([facility]). Interpreters are available.   o For health questions: Call 761-278-1531 or 1-375.382.7428 (7 a.m. to 7 p.m.)  o For questions about schools and childcare: Call 597-715-4667 or 1-951.255.1674 (7 a.m. to 7 p.m.)     Reason for Disposition    MODERATE difficulty breathing (e.g., speaks in phrases, SOB even at rest, pulse 100-120)    Additional Information    Negative: SEVERE difficulty breathing (e.g., struggling for each breath, speaks in single words)    Negative: Difficult to awaken or acting confused (e.g., disoriented, slurred speech)    Negative: Bluish (or gray) lips or face now    Negative: Shock suspected (e.g., cold/pale/clammy skin, too weak to stand, low BP, rapid pulse)    Negative: Sounds like a life-threatening emergency to the triager    Negative: [1] COVID-19 exposure AND [2] has not completed COVID-19 vaccine series AND [3] no symptoms    Negative: [1] COVID-19 exposure AND [2] completed COVID-19 vaccine series (fully vaccinated) AND [3] no symptoms    Negative: COVID-19 vaccine reaction suspected (e.g., fever, headache, muscle aches) occurring during days 1-3 after getting vaccine    Negative: COVID-19 vaccine, questions about    Negative: [1] COVID-19 vaccine series completed (fully vaccinated) in past 3 months AND [2] new-onset of COVID-19 symptoms BUT [3] no known exposure    Negative: [1] Had lab test confirmed COVID-19 infection within last 3 monthsAND [2] new-onset of COVID-19 symptoms BUT [3] no known exposure    Negative: [1] Lives with someone known to have influenza (flu test positive) AND [2] flu-like symptoms (e.g., cough, runny nose, sore throat, SOB; with or without fever)    Negative: [1] Adult with possible COVID-19 symptoms AND [2] triager concerned about severity of symptoms or other causes    Negative: COVID-19 and breastfeeding,  questions about    Negative: SEVERE or constant chest pain or pressure (Exception: mild central chest pain, present only when coughing)    Protocols used: CORONAVIRUS (COVID-19) DIAGNOSED OR [id number] 3.25.21

## 2021-06-17 NOTE — ED NOTES
Pt ambulated through the hallway, denied any shortness of breath, Sp02 was no lower than 95% and heart rate remained below 100bpm

## 2021-06-17 NOTE — ED TRIAGE NOTES
Pt with symptoms starting Monday of sore throat, fatigue, runny nose and sneezing. Tuesday progressively got worse and also added a cough and fever. Today now reports wheezing and shortness of breath with activity and talking. covid test today pending.

## 2021-06-17 NOTE — PATIENT INSTRUCTIONS - HE
Dear Evita Vasquez,    Your symptoms show that you may have coronavirus (COVID-19). This illness can cause fever, cough and trouble breathing. Many people get a mild case and get better on their own. Some people can get very sick.    Because you also reported sore throat I would like to also test you for Strep Throat to determine if we need to treat you for that as well.    What should I do?  We would like to test you for Covid-19 virus and Strep Throat. I have placed orders for these tests.   To schedule: go to your [employer] home page and scroll down to the section that says  You have an appointment that needs to be scheduled  and click the large green button that says  Schedule Now  and follow the steps to find the next available openings.  It is important that when you are asked what the reason for your appointment is that you mention you need BOTH Covid and Strep tests.     If you are unable to complete these [employer] scheduling steps, please call [phone] to schedule your testing.     Return to work/school/ guidance:   Please let your workplace manager and staffing office know when your quarantine ends     We can t give you an exact date as it depends on the above. You can calculate this on your own or work with your manager/staffing office to calculate this. (For example if you were exposed on 10/4, you would have to quarantine for 14 full days. That would be through 10/18. You could return on 10/19.)      If you receive a positive COVID-19 test result, follow the guidance of the those who are giving you the results. Usually the return to work is 10 (or in some cases 20 days from symptom onset.) If you work at [employer], you must also be cleared by Employee Occupational Health and Safety to return to work.        If you receive a negative COVID-19 test result and did not have a high risk exposure to someone with a known positive COVID-19 test, you can return to work once you're free of fever  for 24 hours without fever-reducing medication and your symptoms are improving or resolved.      If you receive a negative COVID-19 test and If you had a high risk exposure to someone who has tested positive for COVID-19 then you can return to work 14 days after your last contact with the positive individual    Note: If you have ongoing exposure to the covid positive person, this quarantine period may be more than 14 days. (For example, if you are continued to be exposed to your child who tested positive and cannot isolate from them, then the quarantine of 7-14 days can't start until your child is no longer contagious. This is typically 10 days from onset of the child's symptoms. So the total duration may be 17-24 days in this case.)    Sign up for [employer].   We know it's scary to hear that you might have COVID-19. We want to track your symptoms to make sure you're okay over the next 2 weeks. Please look for an email from [employer]--this is a free, online program that we'll use to keep in touch. To sign up, follow the link in the email you will receive. Learn more at [email or web address]    How can I take care of myself?    Get lots of rest. Drink extra fluids (unless a doctor has told you not to)    Take Tylenol (acetaminophen) or ibuprofen for fever or pain. If you have liver or kidney problems, ask your family doctor if it's okay to take Tylenol o ibuprofen    If you have other health problems (like cancer, heart failure, an organ transplant or severe kidney disease): Call your specialty clinic if you don't feel better in the next 2 days.    Know when to call 911. Emergency warning signs include:  o Trouble breathing or shortness of breath  o Pain or pressure in the chest that doesn't go away  o Feeling confused like you haven't felt before, or not being able to wake up  o Bluish-colored lips or face    Where can I get more information?  [facility] - About COVID-19:    [email or web address]    CDC - What to Do If You're Sick:   www.cdc.gov/coronavirus/2019-ncov/about/steps-when-sick.html

## 2021-06-17 NOTE — ED PROVIDER NOTES
EMERGENCY DEPARTMENT ENCOUNTER      NAME: Evita Vasquez  AGE: 55 y.o. female  YOB: 1965  MRN: [id number]  EVALUATION DATE & TIME: 2021  7:14 PM    PCP: Mary Kate Duncan NP    ED PROVIDER: Gina Hodges PA-C      Chief Complaint   Patient presents with     Shortness of Breath         FINAL IMPRESSION:  1. Shortness of breath    2. Wheezing    3. Cough    4. Upper respiratory tract infection, unspecified type          ED COURSE & MEDICAL DECISION MAKIN:24 PM I met with the patient for the initial interview and physical examination. Discussed plan for treatment and workup in the ED.  PPE worn throughout all patient encounters includes: gown, gloves, surgical mask, N95 mask, face shield.   8:26 PM RN reports the patient's oxygen saturations remained stable after an ambulation trial. Plan for discharge.   8:28 PM I reassessed the patient. Discussed the plan for discharge with return precautions in place.     55 y.o. female presents to the Emergency Department for evaluation of wheezing, cough, fevers, shortness of breath x 3 days. Patient was seen virtually this morning, plan for a strep test and COVID testing but with shortness of breath was ultimately referred to the ED. Patient did have COVID testing performed at the [facility] and is pending results. She does report recent sick contact (granddaughter with vomiting and cough) but granddaughter was not checked for COVID.    VSS, afebrile. O2 sats >95% on room air. When speaking, patient does tend to take deep breaths in between sentences. Exam with faint wheezing more notable to right middle lobe.    Labs with no leukocytosis. CXR with no focal pneumonia.    Patient was given albuterol inhaler with significant improvement of symptoms. Patient was ambulated in the ED with no hypoxia.     At this time, I am suspicious for early pneumonia with wheezing primarily to right middle lobe and fevers. I did discuss that this could be viral URI or COVID  (although COVID less likely with patient previously being vaccinated). Results are pending.    Will plan to discharge with albuterol inhaler and prescription for Doxycycline. Instructed on close follow up with PCP and red flags/indications to return to the emergency department. All questions were answered to the best of my ability and patient is agreeable with plan.       MEDICATIONS GIVEN IN THE EMERGENCY:  Medications   albuterol inhaler 2 puff (PROAIR HFA;PROVENTIL HFA;VENTOLIN HFA) (2 puffs Inhalation Given 5/12/21 1955)       NEW PRESCRIPTIONS STARTED AT TODAY'S ER VISIT  Discharge Medication List as of 5/12/2021  8:29 PM      START taking these medications    Details   albuterol (PROAIR HFA;PROVENTIL HFA;VENTOLIN HFA) 90 mcg/actuation inhaler Inhale 2 puffs every 4 (four) hours as needed for wheezing., Starting Wed 5/12/2021, Print      doxycycline (VIBRAMYCIN) 100 MG capsule Take 1 capsule (100 mg total) by mouth 2 (two) times a day for 7 days., Starting Wed 5/12/2021, Until Wed 5/19/2021, Print         CONTINUE these medications which have NOT CHANGED    Details   calcium citrate-vitamin D3 250 mg calcium- 200 unit Tab Take 2 tablets by mouth daily. , Until Discontinued, Historical Med      cetirizine (ZYRTEC) 5 MG tablet Take 5 mg by mouth daily., Until Discontinued, Historical Med      cholecalciferol, vitamin D3, 400 unit/mL Drop drops Take 400 Units by mouth daily., Historical Med      clindamycin (CLEOCIN) 300 MG capsule TK 2 CS PO ONE HOUR B DENTAL APPOINTMENT, Historical Med      cyanocobalamin, vitamin B-12, 2,500 mcg Subl Place 2,500 mcg under the tongue daily., Until Discontinued, Historical Med      fluticasone propionate (FLONASE) 50 mcg/actuation nasal spray SHAKE AND INSTILL 2 SPRAYS IN EACH NOSTRIL EVERY DAY AS NEEDED, Normal      latanoprost (XALATAN) 0.005 % ophthalmic solution Administer 1 drop to both eyes at bedtime., Starting 10/11/2017, Until Discontinued, Historical Med      lisinopriL  (PRINIVIL,ZESTRIL) 40 MG tablet Take 1 tablet (40 mg total) by mouth daily., Starting Thu 1/7/2021, Normal      scopolamine (TRANSDERM-SCOP) 1.5 mg transdermal patch Place 1 patch on the skin every third day., Starting Thu 6/11/2020, Normal      triamcinolone (KENALOG) 0.1 % cream Apply topically to affected areas (axilla) twice daily.  Do not use longer than 14 days., Normal                =================================================================    HPI    Patient information was obtained from: Patient    Use of Intrepreter: N/A       Evita Vasquez is a 55 y.o. female with a pertinent history of hypertension, hyperlipidemia, obesity, diabetes, who presents to this ED by walk in for evaluation of shortness of breath, cough, malaise.    Patient was visiting with her granddaughter last week who was sick at the time with nausea, vomiting, and feeling unwell. Beginning on 5/9 (3 days ago) the patient herself began to feel unwell and rundown with headache, sore throat, congestion, and a productive cough with green phlegm. She has been getting progressively worse, especially her shortness of breath, so she had a virtual visit this morning where the provider ordered a rapid strep test and a COVID test. While talking with the , she was unable to get the tests done until 5/15, so she got a COVID test at the [facility] and is awaiting the results. She also mentioned to the  that she was feeling more short of breath, so they transferred her to triage, who told her to go the ED. She has no history of asthma or COPD. Patient quit smoking 20 years ago. She notes today, she had a fever of almost 101F, but did not have a fever in triage at the ED today. She has not taken any medications for her symptoms. Denies any additional symptoms or complaints at this time.     REVIEW OF SYSTEMS   Review of Systems   Constitutional: Positive for fever (almost to 101F, afebrile in triage).        Positive for malaise.    HENT: Positive for congestion and sore throat.    Respiratory: Positive for cough and shortness of breath.    Neurological: Positive for headaches.   All other systems reviewed and are negative.       PAST MEDICAL HISTORY:  Past Medical History:   Diagnosis Date     Abnormal Pap smear of cervix      Diabetes mellitus (H)      Glaucoma      History of anesthesia complications      Hypertension      Non-alcoholic fatty liver disease      PONV (postoperative nausea and vomiting)        PAST SURGICAL HISTORY:  Past Surgical History:   Procedure Laterality Date     BREAST BIOPSY Left 2016     CERVICAL BIOPSY  W/ LOOP ELECTRODE EXCISION  2005     COLPOSCOPY       CYSTOSCOPY N/A 6/19/2018    Procedure: CYSTOSCOPY;  Surgeon: Chika Lozano MD;  Location: [facility];  Service:      HYSTERECTOMY  06/2018     TN LAP, EARLE RESTRICT PROC, LONGITUDINAL GASTRECTOMY N/A 3/13/2017    Procedure: LAPAROSCOPIC SLEEVE GASTRECTOMY;  Surgeon: Tejas Prado MD;  Location: [facility] OR;  Service: General     TN LAPAROSCOPY TOT HYSTERECTOMY UTERUS >250 GRAM W TUBE/OVARY N/A 6/19/2018    Procedure: TOTAL LAPAROSCOPIC HYSTERECTOMY BILATERAL SALPINGECTOMY;  Surgeon: Chika Lozano MD;  Location: [facility];  Service: Gynecology     TONSILLECTOMY       WISDOM TOOTH EXTRACTION             CURRENT MEDICATIONS:    No current facility-administered medications on file prior to encounter.      Current Outpatient Medications on File Prior to Encounter   Medication Sig     calcium citrate-vitamin D3 250 mg calcium- 200 unit Tab Take 2 tablets by mouth daily.      cetirizine (ZYRTEC) 5 MG tablet Take 5 mg by mouth daily.     cholecalciferol, vitamin D3, 400 unit/mL Drop drops Take 400 Units by mouth daily.     clindamycin (CLEOCIN) 300 MG capsule TK 2 CS PO ONE HOUR B DENTAL APPOINTMENT     cyanocobalamin, vitamin B-12, 2,500 mcg Subl Place 2,500 mcg under the tongue daily.     fluticasone propionate (FLONASE) 50  mcg/actuation nasal spray SHAKE AND INSTILL 2 SPRAYS IN EACH NOSTRIL EVERY DAY AS NEEDED     latanoprost (XALATAN) 0.005 % ophthalmic solution Administer 1 drop to both eyes at bedtime.     lisinopriL (PRINIVIL,ZESTRIL) 40 MG tablet Take 1 tablet (40 mg total) by mouth daily.     scopolamine (TRANSDERM-SCOP) 1.5 mg transdermal patch Place 1 patch on the skin every third day.     triamcinolone (KENALOG) 0.1 % cream Apply topically to affected areas (axilla) twice daily.  Do not use longer than 14 days.       ALLERGIES:  Allergies   Allergen Reactions     Mold Other (See Comments)     congestion     Penicillins Hives     Childhood       FAMILY HISTORY:  Family History   Problem Relation Age of Onset     Heart attack Father      Heart attack Brother         56     Diabetes Brother      Heart disease Brother      Dialysis Brother      Cirrhosis Mother      Hypertension Mother      Diabetes type II Mother      Thyroid disease Sister      Diabetes Brother      Heart disease Brother      Diabetes Brother      Heart disease Brother        SOCIAL HISTORY:   Social History     Socioeconomic History     Marital status:      Spouse name: None     Number of children: 1     Years of education: None     Highest education level: None   Occupational History     Occupation: HS    Social Needs     Financial resource strain: None     Food insecurity     Worry: None     Inability: None     Transportation needs     Medical: None     Non-medical: None   Tobacco Use     Smoking status: Former Smoker     Types: Cigarettes     Smokeless tobacco: Never Used   Substance and Sexual Activity     Alcohol use: Yes     Comment: occasional, weekends, with meals. Wine     Drug use: No     Sexual activity: Yes     Partners: Male   Lifestyle     Physical activity     Days per week: None     Minutes per session: None     Stress: None   Relationships     Social connections     Talks on phone: None     Gets together: None     Attends  "Catholic service: None     Active member of club or organization: None     Attends meetings of clubs or organizations: None     Relationship status: None     Intimate partner violence     Fear of current or ex partner: None     Emotionally abused: None     Physically abused: None     Forced sexual activity: None   Other Topics Concern     None   Social History Narrative     None       VITALS:  Patient Vitals for the past 24 hrs:   BP Temp Temp src Pulse Resp SpO2 Height Weight   05/12/21 2030 162/77 -- -- 80 -- 95 % -- --   05/12/21 2000 163/78 -- -- 80 -- 97 % -- --   05/12/21 1931 (!) 177/92 -- -- 77 -- 97 % -- --   05/12/21 1922 -- -- -- 80 -- 96 % -- --   05/12/21 1921 (!) 196/87 -- -- 79 -- 95 % -- --   05/12/21 1728 (!) 212/95 98.3  F (36.8  C) Oral 72 22 96 % 5' 7\" (1.702 m) 214 lb (97.1 kg)       PHYSICAL EXAM    Constitutional:  Well developed, well nourished, no acute distress  EYES: Conjunctivae clear  HENT:  Atraumatic, normocephalic. Oropharynx clear.   Respiratory:  No respiratory distress, Trace wheezing right middle lobe.   Cardiovascular:  Normal rate, normal rhythm, no murmurs.    GI:  Soft, nondistended, non-tender  Musculoskeletal:  No deformities. Moves all extremities equally.  Integument: Warm, Dry, No erythema, No rash.   Neurologic:  Alert & oriented x 3, no focal deficits noted, ambulatory  Psych: Affect normal, Judgment normal, Mood normal.     LAB:  All pertinent labs reviewed and interpreted.  Results for orders placed or performed during the hospital encounter of 05/12/21   Basic Metabolic Panel   Result Value Ref Range    Sodium 139 136 - 145 mmol/L    Potassium 4.0 3.5 - 5.0 mmol/L    Chloride 106 98 - 107 mmol/L    CO2 26 22 - 31 mmol/L    Anion Gap, Calculation 7 5 - 18 mmol/L    Glucose 128 (H) 70 - 125 mg/dL    Calcium 10.4 8.5 - 10.5 mg/dL    BUN 7 (L) 8 - 22 mg/dL    Creatinine 0.66 0.60 - 1.10 mg/dL    GFR MDRD Af Amer >60 >60 mL/min/1.73m2    GFR MDRD Non Af Amer >60 >60 " mL/min/1.73m2   HM1 (CBC with Diff)   Result Value Ref Range    WBC 9.9 4.0 - 11.0 thou/uL    RBC 4.35 3.80 - 5.40 mill/uL    Hemoglobin 14.5 12.0 - 16.0 g/dL    Hematocrit 41.0 35.0 - 47.0 %    MCV 94 80 - 100 fL    MCH 33.3 27.0 - 34.0 pg    MCHC 35.4 32.0 - 36.0 g/dL    RDW 12.9 11.0 - 14.5 %    Platelets 227 140 - 440 thou/uL    MPV 10.1 8.5 - 12.5 fL    Neutrophils % 71 (H) 50 - 70 %    Lymphocytes % 16 (L) 20 - 40 %    Monocytes % 7 2 - 10 %    Eosinophils % 6 0 - 6 %    Basophils % 1 0 - 2 %    Immature Granulocyte % 0 <=0 %    Neutrophils Absolute 7.0 2.0 - 7.7 thou/uL    Lymphocytes Absolute 1.6 0.8 - 4.4 thou/uL    Monocytes Absolute 0.7 0.0 - 0.9 thou/uL    Eosinophils Absolute 0.6 (H) 0.0 - 0.4 thou/uL    Basophils Absolute 0.1 0.0 - 0.2 thou/uL    Immature Granulocyte Absolute 0.0 <=0.0 thou/uL       RADIOLOGY:  Reviewed all pertinent imaging. Please see official radiology report.  Xr Chest 2 Views    Result Date: 5/12/2021  EXAM: XR CHEST 2 VIEWS LOCATION: [facility] DATE/TIME: 5/12/2021 6:43 PM INDICATION: Shortness of breath, fatigue. Suspected COVID COMPARISON: 2/23/2017     No change. Heart size and pulmonary vessels are normal. Lungs are clear. Moderate degenerative changes of thoracic spine.      I, Rebecca Driver, am serving as a scribe to document services personally performed by Gina Hodges PA-C based on my observation and the provider's statements to me. I, Gina Hodges PA-C attest that Rebecca Driver is acting in a scribe capacity, has observed my performance of the services and has documented them in accordance with my direction.    Gina Hodges PA-C  Emergency Medicine  [facility] EMERGENCY ROOM  [address] [address]  Dept: [phone]  Loc: [phone]     Gina Hodges PA-C  05/13/21 0130

## 2021-06-18 NOTE — PROGRESS NOTES
15 mos s/p LSG lab orders placed for patient in preparation for appointment with  on 6/12/18.    Chika Valadez RN, N  [facility]  P [phone]  F [phone]

## 2021-06-18 NOTE — ANESTHESIA CARE TRANSFER NOTE
Last vitals:   Vitals:    06/19/18 1340   BP: 197/94   Pulse: 90   Resp: 16   Temp: 36.8  C (98.2  F)   SpO2: 99%     Patient's level of consciousness is drowsy  Spontaneous respirations: yes  Maintains airway independently: yes  Dentition unchanged: yes  Oropharynx: oropharynx clear of all foreign objects    QCDR Measures:  ASA# 20 - Surgical Safety Checklist: WHO surgical safety checklist completed prior to induction  PQRS# 430 - Adult PONV Prevention: 4558F - Pt received => 2 anti-emetic agents (different classes) preop & intraop  ASA# 8 - Peds PONV Prevention: NA - Not pediatric patient, not GA or 2 or more risk factors NOT present  PQRS# 424 - Donna-op Temp Management: 4559F - At least one body temp DOCUMENTED => 35.5C or 95.9F within required timeframe  PQRS# 426 - PACU Transfer Protocol: - Transfer of care checklist used  ASA# 14 - Acute Post-op Pain: ASA14B - Patient did NOT experience pain >= 7 out of 10

## 2021-06-18 NOTE — PROGRESS NOTES
Preoperative Exam    Scheduled Procedure: Hysterectomy  Surgery Date:  06.19.2018  Surgery Location: [facility], fax [phone]    Surgeon:  Dr. Lozano    Assessment/Plan:     1. Abnormal Pap smear of cervix  She is now having this treated surgically with hysterectomy.  She will discuss with her surgeon on Thursday of this week if she is having her ovaries removed as well.    2. Glaucoma  Stable.    3. Hyperlipidemia  She is not on medication for this.  She had bariatric surgery.  She will return for fasting blood work at a later time.  - Comprehensive Metabolic Panel    4. Mitral regurgitation  She has been asymptomatic.    5. Hypertension  Her blood pressure is mildly elevated today.  She has been off of her medication for a few days.  We have refilled her lisinopril.  She is advised not to take it on the morning of surgery.  - Electrocardiogram Perform and Read  - Comprehensive Metabolic Panel    6. NAFLD (nonalcoholic fatty liver disease)  She is had gastric surgery to help with weight loss.  We will get her liver function tests today with her conference of metabolic panel.    7. S/P laparoscopic sleeve gastrectomy  She will have follow-up in July.    8. Type 2 diabetes mellitus  Uncertain degree of control.  She is currently managed with diet.  We will update her blood work today.  - Glycosylated Hemoglobin A1c  - Microalbumin, Random Urine  - Comprehensive Metabolic Panel    9. Preop examination  Pending labs and EKG, I find no contraindication to planned surgical procedure  - Electrocardiogram Perform and Read  - HM2(CBC w/o Differential)    Surgical Procedure Risk: Intermediate (reported cardiac risk generally 1-5%)  Have you had prior anesthesia?: Yes  Have you or any family members had a previous anesthesia reaction:  No  Do you or any family members have a history of a clotting or bleeding disorder?: No  Cardiac Risk Assessment: no increased risk for major cardiac complications    Patient  approved for surgery with general or local anesthesia.    Functional Status: Independent  Patient plans to recover at home with family.     Subjective:      Evita Vasquez is a 52 y.o. female who presents for a preoperative consultation.  For several years, she has been treated for an abnormal Pap smear.  She has had this treated with conservative and more aggressive measures.  She has had 2 colposcopies.  Their hope was that her last 6 month Pap would be normal however it was not.  Did discuss definitive management with hysterectomy.  She said that she had a emotional reaction to this initially.  She has some questions about whether her ovaries will be removed or not.  She is still having a regular menstrual cycle although the intervals are changing.  She meets with her surgeon on Thursday of this week.  She denies any chest pain or shortness of breath.  She is able to climb stairs and she is able to run.    Her past medical history includes diet-controlled type 2 diabetes.  She has not been checking her blood sugars.  She has hypertension.  This had been well controlled until she ran out of lisinopril.  She will be resuming that.  She is had bariatric surgery done.  She has done well with weight loss until recently.  She has been under some stressors at work and at home.  She will be following up in July with her bariatric specialist.  She is otherwise doing and feeling well.    All other systems reviewed and are negative, other than those listed in the HPI.    Pertinent History  Do you have difficulty breathing or chest pain after walking up a flight of stairs: No  History of obstructive sleep apnea: No  Steroid use in the last 6 months: Yes:    Frequent Aspirin/NSAID use: No  Prior Blood Transfusion: No  Prior Blood Transfusion Reaction: No  If for some reason prior to, during or after the procedure, if it is medically indicated, would you be willing to have a blood transfusion?:  There is no transfusion  refusal.    Current Outpatient Prescriptions   Medication Sig Dispense Refill     calcium citrate-vitamin D3 250 mg calcium- 200 unit Tab Take 2 tablets by mouth daily.        cetirizine (ZYRTEC) 5 MG tablet Take 5 mg by mouth daily.       cholecalciferol, vitamin D3, 5,000 unit Tab Take 5,000 Units by mouth daily.       cyanocobalamin, vitamin B-12, 2,500 mcg Subl Place 2,500 mcg under the tongue daily.       fluticasone (FLONASE) 50 mcg/actuation nasal spray 2 sprays into each nostril daily as needed.        latanoprost (XALATAN) 0.005 % ophthalmic solution Administer 1 drop to both eyes at bedtime.  11     pediatric multivitamin-iron (POLY-VI-SOL WITH IRON) chewable tablet Chew 2 tablets daily.        clindamycin (CLEOCIN) 300 MG capsule TK 2 CS PO ONE HOUR B DENTAL APPOINTMENT  0     fluticasone (FLONASE) 50 mcg/actuation nasal spray SHAKE LIQUID AND USE 2 SPRAYS IN EACH NOSTRIL EVERY DAY AS NEEDED 48 g 3     lisinopril (PRINIVIL,ZESTRIL) 10 MG tablet Take 1 tablet (10 mg total) by mouth daily. 90 tablet 3     scopolamine (TRANSDERM-SCOP) 1.5 mg (1 mg over 3 days) transdermal patch Place 1 patch on the skin every third day. 4 patch 0     No current facility-administered medications for this visit.         Allergies   Allergen Reactions     Mold Other (See Comments)     congestion     Penicillins Hives     Childhood       Patient Active Problem List   Diagnosis     Hyperlipidemia     Hypertension     Abnormal Pap smear of cervix     Simple goiter     NAFLD (nonalcoholic fatty liver disease)     Glaucoma     Vitamin D deficiency     Morbid obesity due to excess calories     Type 2 diabetes mellitus     Rhinitis     S/P laparoscopic sleeve gastrectomy     Mitral regurgitation       Past Medical History:   Diagnosis Date     Abnormal Pap smear of cervix        Past Surgical History:   Procedure Laterality Date     BREAST BIOPSY Left 2016     CERVICAL BIOPSY  W/ LOOP ELECTRODE EXCISION  2005     COLPOSCOPY       CO  "LAP, EARLE RESTRICT PROC, LONGITUDINAL GASTRECTOMY N/A 3/13/2017    Procedure: LAPAROSCOPIC SLEEVE GASTRECTOMY;  Surgeon: Tejas Prado MD;  Location: [facility];  Service: General     TONSILLECTOMY       WISDOM TOOTH EXTRACTION         Social History     Social History     Marital status:      Spouse name: N/A     Number of children: 1     Years of education: N/A     Occupational History     HS       Social History Main Topics     Smoking status: Former Smoker     Types: Cigarettes     Smokeless tobacco: Never Used     Alcohol use Yes      Comment: occasional, weekends, with meals. Wine     Drug use: No     Sexual activity: Yes     Partners: Male     Other Topics Concern     Not on file     Social History Narrative       Patient Care Team:  Carmen Ryan MD as PCP - General (Internal Medicine)          Objective:     Vitals:    06/12/18 1122   BP: 140/80   Weight: 189 lb 12.8 oz (86.1 kg)   Height: 5' 7\" (1.702 m)       Physical Exam:  General Appearance: Alert, cooperative, no distress, appears stated age   Head: Normocephalic, without obvious abnormality, atraumatic   Eyes: PERRL, conjunctiva/corneas clear, EOM's intact   Throat: Lips, mucosa, and tongue normal; teeth and gums normal   Neck: Normal ROM   Lungs: Clear to auscultation bilaterally, respirations unlabored.   Heart: Regular rate and rhythm, S1 and S2 normal, no murmur, rub, or gallop,   Abdomen: Soft, non-tender, bowel sounds active, no masses, no organomegaly   Extremities: Extremities normal, atraumatic, no cyanosis or edema   Skin: Skin color, texture, turgor normal, no rashes or lesions   Neurologic: Normal       Patient Instructions   Hold all supplements, aspirin and NSAIDs for 7 days prior to surgery.    Follow your surgeon's direction on when to stop eating and drinking prior to surgery.    Your surgeon will be managing your pain after your surgery.      Remove all jewelry and metal piercings before your " surgery.     Remove nail polish from fingers before surgery.    Don't take your lisinopril on the morning of surgery; you can resume it post-op once your BP is >140/80 X 2.    Today's labs will be available on [employer].  If you aren't signed up, then we'll mail them out.  If anything needs more immediate follow up, we'll also call.    Take care!      EKG:  pending    Labs:  Labs pending at this time.  Results will be reviewed when available.    Immunization History   Administered Date(s) Administered     Influenza, Seasonal, Inj PF IIV3 10/01/2011     Influenza, inj, historic,unspecified 11/10/2008, 10/20/2009, 10/26/2010, 10/08/2013, 10/21/2014, 11/01/2016     Influenza, seasonal,quad inj 36+ mos 09/28/2015, 09/18/2017     Influenza, seasonal,quad inj 6-35 mos 12/07/2012     Td,adult,historic,unspecified 10/19/2000     Tdap 03/07/2011           Electronically signed by Carmen Ryan MD 06/12/18 11:19 AM

## 2021-06-18 NOTE — ANESTHESIA PREPROCEDURE EVALUATION
Anesthesia Evaluation      Patient summary reviewed   No history of anesthetic complications (ponv)     Airway   Mallampati: II   Pulmonary - negative ROS and normal exam                          Cardiovascular - negative ROS  Exercise tolerance: > or = 4 METS  (+) hypertension well controlled, , hypercholesterolemia,     ECG reviewed (SB)  Rhythm: regular  Rate: normal,         Neuro/Psych - negative ROS   (+) neuromuscular disease,      Endo/Other    (+) diabetes mellitus type 2 well controlled,      GI/Hepatic/Renal - negative ROS     Comments: Fatty liver     Other findings: Hyperlipidemia     Hypertension    Abnormal Pap smear of cervix    Simple goiter    NAFLD (nonalcoholic fatty liver disease)    Glaucoma    Vitamin D deficiency    Morbid obesity due to excess calories    Type 2 diabetes mellitus    Rhinitis    S/P laparoscopic sleeve gastrectomy    Mitral regurgitation  Results for JOVAN GUO (MRN [id number]) as of 6/18/2018 20:24    6/12/2018 12:22  Sodium: 139  Potassium: 4.0  Chloride: 103  CO2: 23  Anion Gap, Calculation: 13  BUN: 13  Creatinine: 0.70  GFR MDRD Af Amer: >60  GFR MDRD Non Af Amer: >60  Calcium: 9.8  AST: 23  ALT: 13  ALBUMIN: 4.2  Protein, Total: 7.3  Alkaline Phosphatase: 89  Bilirubin, Total: 0.4  Glucose: 88  Hemoglobin A1c: 5.4  WBC: 8.9  RBC: 4.19  Hemoglobin: 13.4  Hematocrit: 40.7  MCV: 97  MCH: 32.1  MCHC: 33.1  RDW: 11.3  Platelets: 252          Dental - normal exam                        Anesthesia Plan  Planned anesthetic: general endotracheal  Scopolamine  Background propofol for PONV  Zofran/decadron  Soft bite block      ASA 2   Induction: intravenous   Anesthetic plan and risks discussed with: patient    Post-op plan: routine recovery

## 2021-06-18 NOTE — PATIENT INSTRUCTIONS - HE
Patient Instructions by Mary Kate Duncan NP at 1/7/2021  8:20 AM     Author: Mary Kate Duncan NP Service: -- Author Type: Nurse Practitioner    Filed: 1/7/2021  8:22 AM Encounter Date: 1/7/2021 Status: Signed    : Mary Kate Duncan NP (Nurse Practitioner)       Patient Education     Prevention Guidelines, Women Ages 50 to 64  Screening tests and vaccines are an important part of managing your health. A screening test is done to find possible disorders or diseases in people who don't have any symptoms. The goal is to find a disease early so lifestyle changes can be made and you can be watched more closely to reduce the risk of disease, or to detect it early enough to treat it most effectively. Screening tests are not considered diagnostic, but are used to determine if more testing is needed. Health counseling is essential, too. Below are guidelines for these, for women ages 50 to 64. Talk with your healthcare provider to make sure youre up to date on what you need.  Screening Who needs it How often   Type 2 diabetes or prediabetes All women beginning at age 45 and women without symptoms at any age who are overweight or obese and have 1 or more additional risk factors for diabetes. At  least every 3 years   Type 2 diabetes or prediabetes All women diagnosed with gestational diabetes Lifelong testing every 3 years   Type 2 diabetes All women with prediabetes Every year   Alcohol misuse All women in this age group At routine exams   Blood pressure All women in this age group Yearly checkup if your blood pressure is normal  Normal blood pressure is less than 120/80 mm Hg  If your blood pressure reading is higher than normal, follow the advice of your healthcare provider   Breast cancer All women at average risk in this age group Yearly mammogram should be done until age 54. At age 55, you can switch to every other year or choose to continue yearly.  All women should know the possible benefits and risks of breast  cancer screening with mammograms.   Cervical cancer All women in this age group, except women who have had a complete hysterectomy Pap test every 3 years or Pap test with human papillomavirus (HPV) test every 5 years   Chlamydia Women at increased risk for infection At routine exams   Colorectal cancer All women at average risk in this age group Multiple tests are available and are used at different times. Possible tests include:    Flexible sigmoidoscopy every 5 years, or    Colonoscopy every 10 years, or    CT colonography (virtual colonoscopy) every 5 years, or    Yearly fecal occult blood test, or    Yearly fecal immunochemical test every year, or    Stool DNA test, every 3 years  If you choose a test other than a colonoscopy and have an abnormal test result, you will need to follow up with a colonoscopy. Screening advice varies among expert groups. Talk with your healthcare provider about which tests are best for you.  Some people should be screened using a different schedule because of their personal or family health history. Talk with your healthcare provider about your health history.   Depression All women in this age group At routine exams   Gonorrhea Sexually active women at increased risk for infection At routine exams   Hepatitis C Anyone at increased risk; 1 time for those born between 1945 and 1965 At routine exams   High cholesterol or triglycerides All women in this age group who are at risk for coronary artery disease At least every 5 years   HIV All women At routine exams   Lung cancer Adults age 55 to 80 who have smoked Yearly screening in smokers with 30 pack-year history of smoking or who quit within 15 years   Obesity All women in this age group At routine exams   Osteoporosis Women who are postmenopausal Ask your healthcare provider   Syphilis Women at increased risk for infection - talk with your healthcare provider At routine exams   Tuberculosis Women at increased risk for infection - talk  with your healthcare provider Ask your healthcare provider   Vision All women in this age group Ask your healthcare provider   Vaccine Who needs it How often   Chickenpox (varicella) All women in this age group who have no record of this infection or vaccine 2 doses; the second dose should be given at least 4 weeks after the first dose   Hepatitis A Women at increased risk for infection - talk with your healthcare provider 2 doses given at least 6 months apart   Hepatitis B Women at increased risk for infection - talk with your healthcare provider 3 doses over 6 months; second dose should be given 1 month after the first dose; the third dose should be given at least 2 months after the second dose and at least 4 months after the first dose   Haemophilus influenzae Type B (HIB) Women at increased risk for infection - talk with your healthcare provider 1 to 3 doses   Influenza (flu) All women in this age group Once a year   Measles, mumps, rubella (MMR) Women in this age group through their late 50s who have no record of these infections or vaccines 1 dose   Meningococcal Women at increased risk for infection - talk with your healthcare provider 1 or more doses   Pneumococcal conjugate vaccine (PCV13) and pneumococcal polysaccharide vaccine (PPSV23) Women at increased risk for infection - talk with your healthcare provider PCV13: 1 dose ages 19 to 65 (protects against 13 types of pneumococcal bacteria)  PPSV23: 1 to 2 doses through age 64, or 1 dose at 65 or older (protects against 23 types of pneumococcal bacteria)   Tetanus/diphtheria/pertussis (Td/Tdap) booster All women in this age group Td every 10 years, or a 1-time dose of Tdap instead of a Td booster after age 18, then Td every 10 years   Zoster All women ages 60 and older 1 dose   Counseling Who needs it How often   BRCA gene mutation testing for breast and ovarian cancer susceptibility Women with increased risk for having gene mutation When your risk is known    Breast cancer and chemoprevention Women at high risk for breast cancer When your risk is known   Diet and exercise Women who are overweight or obese When diagnosed, and then at routine exams   Sexually transmitted infection prevention Women at increased risk for infection - talk with your healthcare provider At routine exams   Use of daily aspirin Women ages 55 and up in this age group who are at risk for cardiovascular health problems such as stroke When your risk is known   Use of tobacco and the health effects it can cause All women in this age group Every exam   1 American [employer]  Date Last Reviewed: 1/26/2016 2000-2019 [employer]. [address] [place] [address]. All rights reserved. This information is not intended as a substitute for professional medical care. Always follow your healthcare professional's instructions.

## 2021-06-18 NOTE — ANESTHESIA POSTPROCEDURE EVALUATION
Patient: Evita Vasquez  TOTAL LAPAROSCOPIC HYSTERECTOMY BILATERAL SALPINGECTOMY, CYSTOSCOPY  Anesthesia type: general    Patient location: PACU  Last vitals:   Vitals:    06/19/18 1410   BP: 162/81   Pulse: (!) 59   Resp: 12   Temp:    SpO2: 99%     Post vital signs: stable  Level of consciousness: awake and responds to simple questions  Post-anesthesia pain: pain controlled  Post-anesthesia nausea and vomiting: no  Pulmonary: unassisted, return to baseline  Cardiovascular: stable and blood pressure at baseline  Hydration: adequate  Anesthetic events: no    QCDR Measures:  ASA# 11 - Donna-op Cardiac Arrest: ASA11B - Patient did NOT experience unanticipated cardiac arrest  ASA# 12 - Donna-op Mortality Rate: ASA12B - Patient did NOT die  ASA# 13 - PACU Re-Intubation Rate: ASA13B - Patient did NOT require a new airway mgmt  ASA# 10 - Composite Anes Safety: ASA10A - No serious adverse event    Additional Notes:

## 2021-06-23 NOTE — PATIENT INSTRUCTIONS - HE
Today's labs will be available on [employer].  If you aren't signed up, then we'll mail them out.  If anything needs more immediate follow up, we'll also call.    Consider the lexapro; see me in 2-3 weeks after starting if you go that route.    Thyroid ultrasound:  [phone]    Take care!

## 2021-06-23 NOTE — PROGRESS NOTES
ASSESSMENT and PLAN:  1. S/P laparoscopic sleeve gastrectomy  She has maintained some weight loss but is aware of some bad habits.  We discussed acknowledging the amount of stress in her life and addressing that.  I suggested ssri in place of wine.      2. Hyperlipidemia, unspecified hyperlipidemia type  She is fasting for labs.  Not on meds  - Lipid Cascade    3. Hypertension  Adequate control on current meds.  Labs today  - Comprehensive Metabolic Panel    4. Simple goiter  I rec u/s and labs.  - Thyroid [place]  - US Thyroid; Future    5. NAFLD (nonalcoholic fatty liver disease)  She's had bariatric surgery.  Labs today.  - Comprehensive Metabolic Panel    6. Glaucoma, unspecified glaucoma type, unspecified laterality  Follows w/ her eye dr    7. Vitamin D deficiency  Labs today  - Vitamin D, Total (25-Hydroxy)    8. Type 2 diabetes mellitus without complication, without long-term current use of insulin (H)  Managed w/ diet and weight loss w/ surgery. Uncertain degree of control.  Labs today.  - Glycosylated Hemoglobin A1c    9. Routine general medical examination at a health care facility  UTD on cancer screens.    10. Adjustment disorder with mixed anxiety and depressed mood  I recommended low dose ssri.  Side effects discussed.  She's going to think about it.  - escitalopram oxalate (LEXAPRO) 5 MG tablet; Take 1 tablet (5 mg total) by mouth daily.  Dispense: 30 tablet; Refill: 1        Patient Instructions   Today's labs will be available on [facility].  If you aren't signed up, then we'll mail them out.  If anything needs more immediate follow up, we'll also call.    Consider the lexapro; see me in 2-3 weeks after starting if you go that route.    Thyroid ultrasound:  [phone]    Take care!      Orders Placed This Encounter   Procedures     US Thyroid     Standing Status:   Future     Standing Expiration Date:   1/30/2020     Order Specific Question:   Is the patient pregnant?     Answer:   No     Order  Specific Question:   Can the procedure be changed per Radiologist protocol?     Answer:   Yes     Glycosylated Hemoglobin A1c     Comprehensive Metabolic Panel     Lipid Cascade     Order Specific Question:   Fasting is required?     Answer:   Yes     Thyroid Cascade     Vitamin D, Total (25-Hydroxy)     Medications Discontinued During This Encounter   Medication Reason     oxyCODONE-acetaminophen (PERCOCET) 5-325 mg per tablet Therapy completed     pediatric multivitamin-iron (POLY-VI-SOL WITH IRON) chewable tablet Therapy completed     ibuprofen (ADVIL,MOTRIN) 600 MG tablet Therapy completed     cholecalciferol, vitamin D3, 5,000 unit Tab Therapy completed       Return in about 6 months (around 7/30/2019).    ASSESSED PROBLEMS:  Problem List Items Addressed This Visit     Hyperlipidemia    Relevant Orders    Lipid [place] (Completed)    Hypertension    Relevant Orders    Comprehensive Metabolic Panel (Completed)    Simple goiter    Relevant Orders    Thyroid [place]on (Completed)    US Thyroid    NAFLD (nonalcoholic fatty liver disease)    Relevant Orders    Comprehensive Metabolic Panel (Completed)    Glaucoma    Vitamin D deficiency    Relevant Orders    Vitamin D, Total (25-Hydroxy) (Completed)    Type 2 diabetes mellitus (H)    Relevant Orders    Glycosylated Hemoglobin A1c (Completed)    S/P laparoscopic sleeve gastrectomy      Other Visit Diagnoses     Routine general medical examination at a health care facility    -  Primary    Adjustment disorder with mixed anxiety and depressed mood        Relevant Medications    escitalopram oxalate (LEXAPRO) 5 MG tablet          CHIEF COMPLAINT:  Chief Complaint   Patient presents with     Annual Exam     fasting       HISTORY OF PRESENT ILLNESS:  Evita Vasquez is a 53 y.o. female is presenting to the clinic today for an annual exam.  Follwing her hysterectomy last year, she has had some difficulties.  She's had a weight gain.  She's avoiding things.  She's under  stress--she's working and also getting her masters' degree.  She experiences anxiety and at night, relaxes with food and wine.  She knows that isn't ideal.  She pushes through to get to the weekend and then is exhausted and feels foggy on Saturday.      Her sister had thyroid surgery and had a parathyroid gland removed.  She has a hx of a goiter and had a bx about six years ago.    She has diet controlled DM2.  She hasn't been checking her sugars.      She injured her right ankle/shin on a workout machine around [place].  It's taken time to heal.    Health Maintenance:  She has her eyes examined regularly and visits the dentist on a regular basis.   Mammogram: 9/12/18  Colonoscopy: 1/31/14?  Pap Smear: s/p hyster (benign path)    REVIEW OF SYSTEMS:   She denies nipple discharge and drainage.  All other systems are negative.    PFSH:  Past Medical History:   Diagnosis Date     Abnormal Pap smear of cervix      Diabetes mellitus (H)      Glaucoma      History of anesthesia complications      Hypertension      Non-alcoholic fatty liver disease      PONV (postoperative nausea and vomiting)      Past Surgical History:   Procedure Laterality Date     BREAST BIOPSY Left 2016     CERVICAL BIOPSY  W/ LOOP ELECTRODE EXCISION  2005     COLPOSCOPY       CYSTOSCOPY N/A 6/19/2018    Procedure: CYSTOSCOPY;  Surgeon: Chika Lozano MD;  Location: [facility];  Service:      HYSTERECTOMY  06/2018     NH LAP, EARLE RESTRICT PROC, LONGITUDINAL GASTRECTOMY N/A 3/13/2017    Procedure: LAPAROSCOPIC SLEEVE GASTRECTOMY;  Surgeon: Tejas Prado MD;  Location[facility];  Service: General     NH LAPAROSCOPY TOT HYSTERECTOMY UTERUS >250 GRAM W TUBE/OVARY N/A 6/19/2018    Procedure: TOTAL LAPAROSCOPIC HYSTERECTOMY BILATERAL SALPINGECTOMY;  Surgeon: Chika Lozano MD;  Location: [facility] OR;  Service: Gynecology     TONSILLECTOMY       WISDOM TOOTH EXTRACTION       Family History   Problem Relation Age  of Onset     Heart attack Father      Heart attack Brother         56     Diabetes Brother      Heart disease Brother      Dialysis Brother      Cirrhosis Mother      Hypertension Mother      Diabetes type II Mother      Thyroid disease Sister      Diabetes Brother      Heart disease Brother      Diabetes Brother      Heart disease Brother      Social History     Socioeconomic History     Marital status:      Spouse name: Not on file     Number of children: 1     Years of education: Not on file     Highest education level: Not on file   Social Needs     Financial resource strain: Not on file     Food insecurity - worry: Not on file     Food insecurity - inability: Not on file     Transportation needs - medical: Not on file     Transportation needs - non-medical: Not on file   Occupational History     Occupation: HS    Tobacco Use     Smoking status: Former Smoker     Types: Cigarettes     Smokeless tobacco: Never Used   Substance and Sexual Activity     Alcohol use: Yes     Comment: occasional, weekends, with meals. Wine     Drug use: No     Sexual activity: Yes     Partners: Male   Other Topics Concern     Not on file   Social History Narrative     Not on file       VITALS:  Vitals:    01/30/19 0732   BP: 130/74   Patient Site: Right Arm   Patient Position: Sitting   Cuff Size: Adult Regular   Pulse: (!) 57   SpO2: 99%   Weight: 204 lb 4 oz (92.6 kg)     Wt Readings from Last 3 Encounters:   01/30/19 204 lb 4 oz (92.6 kg)   06/19/18 193 lb (87.5 kg)   06/12/18 189 lb 12.8 oz (86.1 kg)       PHYSICAL EXAM:  Constitutional:  Reveals an alert, pleasant adult female.   Vitals:  Noted.   Head: Normocephalic, without obvious abnormality, atraumatic   Ears: TM's normal bilaterally   Eyes: PERRL, conjunctiva/corneas clear, EOM's intact   Throat: Lips, mucosa, and tongue normal; teeth and gums normal   Neck: Normal ROM, no carotid bruits, no thyromegaly   Lungs: Clear to auscultation bilaterally,  respirations unlabored.   Breast exam:  Normal skin overlying her breasts bilaterally no discrete nodule is palpable in the axilla bilaterally  Heart: Regular rate and rhythm, S1 and S2 normal, no murmur, rub, or gallop,   Abdomen: Soft, non-tender, bowel sounds active all four quadrants, no masses, no organomegaly   Extremities: Extremities normal, atraumatic, no cyanosis or edema   Pelvic:Not examined  Skin: Skin color, texture, turgor normal, no rashes or lesions   Neurologic: Normal     MEDICATIONS:  Current Outpatient Medications   Medication Sig Dispense Refill     calcium citrate-vitamin D3 250 mg calcium- 200 unit Tab Take 2 tablets by mouth daily.        cetirizine (ZYRTEC) 5 MG tablet Take 5 mg by mouth daily.       cholecalciferol, vitamin D3, 400 unit/mL Drop drops Take 400 Units by mouth daily.       clindamycin (CLEOCIN) 300 MG capsule TK 2 CS PO ONE HOUR B DENTAL APPOINTMENT  0     cyanocobalamin, vitamin B-12, (CYANOCOBALAMIN) 1,000 mcg tablet Take 1,000 mcg by mouth daily.       cyanocobalamin, vitamin B-12, 2,500 mcg Subl Place 2,500 mcg under the tongue daily.       docusate sodium (COLACE) 100 MG capsule Take 1 capsule (100 mg total) by mouth 2 (two) times a day. 60 capsule 1     fluticasone (FLONASE) 50 mcg/actuation nasal spray SHAKE LIQUID AND USE 2 SPRAYS IN EACH NOSTRIL EVERY DAY AS NEEDED 48 g 3     latanoprost (XALATAN) 0.005 % ophthalmic solution Administer 1 drop to both eyes at bedtime.  11     lisinopril (PRINIVIL,ZESTRIL) 10 MG tablet Take 1 tablet (10 mg total) by mouth daily. 90 tablet 3     scopolamine (TRANSDERM-SCOP) 1.5 mg transdermal patch Place 1 patch on the skin every third day. 4 patch 0     escitalopram oxalate (LEXAPRO) 5 MG tablet Take 1 tablet (5 mg total) by mouth daily. 30 tablet 1     No current facility-administered medications for this visit.

## 2021-06-24 NOTE — TELEPHONE ENCOUNTER
Test Results  Who is calling?:  Evita  Who ordered the test:  Carmen Ryan MD   Type of test: Imaging U/S of thyroid.  Result is in chart.  Date of test:  2/18/19  Where was the test performed:  [facility]  What are your questions/concerns?:  She is anxious to know the result.  She is hopeful that she receives a call today.  Okay to leave a detailed message?:  Yes

## 2021-06-24 NOTE — TELEPHONE ENCOUNTER
Spoke with pt, she is aware of the results, but has a number of questions she would like to talk to PCP about. She is requesting to have Dr. Carmen Johnson call her to discuss. I did let her know that I can't guarantee that Carmen will call her (our providers do not typically do that.)  She may want her to be seen in clinic to discuss all of her concerns.    -Carmen are you willing to call patient on Tuesday to discuss? I did let her know that you will not be in until Tuesday.

## 2021-06-24 NOTE — TELEPHONE ENCOUNTER
Thyroid was mildly enlarged and showed a 0.7 cm right lower lobe nodule.  Radiology recommended a one-year follow-up.  The previously seen mixed solid and cystic left thyroid lobe nodule was no longer present on this last exam.

## 2021-06-24 NOTE — TELEPHONE ENCOUNTER
Because she has a number of questions, she should come in to discuss them.  We could also give her a referral to lee if she'd prefer.

## 2021-06-24 NOTE — TELEPHONE ENCOUNTER
Please advise when to schedule.    Auth Provider: CARMEN NEVES Enc Provider: Carmen Neves MD   Diagnosis: Simple goiter

## 2021-06-24 NOTE — TELEPHONE ENCOUNTER
Left detailed message for patient with information below.     If she calls back, please let her know she can also go see an endocrinologist if she wants. She would just need to let us know so Dr. Ryan can place the referral.  Tamika Wolfe CMA ............... 4:58 PM, 03/05/19

## 2021-06-24 NOTE — TELEPHONE ENCOUNTER
Referral Request  Type of referral: Endocrinology  Who s requesting: Patient  Why the request: Per the request of patient. Please refer to chart note dated 02/22/2019.  Have you been seen for this request: Yes  Does patient have a preference on a group/provider? [facility]  Okay to leave a detailed message?  Yes

## 2021-07-09 ENCOUNTER — RECORDS - HEALTHEAST (OUTPATIENT)
Dept: ADMINISTRATIVE | Facility: OTHER | Age: 56
End: 2021-07-09

## 2021-07-09 LAB — RETINOPATHY: NEGATIVE

## 2021-07-12 ENCOUNTER — RECORDS - HEALTHEAST (OUTPATIENT)
Dept: HEALTH INFORMATION MANAGEMENT | Facility: CLINIC | Age: 56
End: 2021-07-12

## 2021-08-16 DIAGNOSIS — I10 ESSENTIAL HYPERTENSION WITH GOAL BLOOD PRESSURE LESS THAN 130/80: ICD-10-CM

## 2021-08-18 RX ORDER — LISINOPRIL 40 MG/1
TABLET ORAL
Qty: 90 TABLET | Refills: 3 | OUTPATIENT
Start: 2021-08-18

## 2021-08-18 NOTE — TELEPHONE ENCOUNTER
Patient should have refills remaining on file.  Lisinopril filled 1/7/2021 #90 tablets with 3 refills.    lisinopriL (PRINIVIL,ZESTRIL) 40 MG tablet 90 tablet 3 1/7/2021  No   Sig - Route: Take 1 tablet (40 mg total) by mouth daily. - Oral   Sent to pharmacy as: lisinopriL 40 mg tablet (PRINIVIL,ZESTRIL)   E-Prescribing Status: Receipt confirmed by pharmacy (1/7/2021  9:07 AM CST     Kalli Morris, RN  Triage Nurse Advisor

## 2021-10-11 ENCOUNTER — HEALTH MAINTENANCE LETTER (OUTPATIENT)
Age: 56
End: 2021-10-11

## 2021-11-26 ENCOUNTER — NURSE TRIAGE (OUTPATIENT)
Dept: NURSING | Facility: CLINIC | Age: 56
End: 2021-11-26
Payer: COMMERCIAL

## 2021-11-26 ENCOUNTER — TELEPHONE (OUTPATIENT)
Dept: FAMILY MEDICINE | Facility: CLINIC | Age: 56
End: 2021-11-26
Payer: COMMERCIAL

## 2021-11-26 NOTE — TELEPHONE ENCOUNTER
See other telephone encounter, patient called back and spoke with triage nurse Shyanne. Closing encounter at this time.     Meenu Blair RN, BSN Nurse Triage Advisor 1:49 PM 11/26/2021

## 2021-11-26 NOTE — TELEPHONE ENCOUNTER
Attempt #1: Left a non-detailed message for patient to return call and request to speak with any triage RN.     1) Patient needs to meet requirements below to end home isolation.     If you have symptoms: stay home and away from others (self-isolate) until:    You've had no fever--and no medicine that reduces fever--for 1 full day (24 hours). And       Your other symptoms have gotten better. For example, your cough or breathing has improved. And     At least 10 days have passed since your symptoms started. (If you've been told by a doctor that you have a weak immune system, wait 20 days.)     If you don't have symptoms: Stay home and away from others (self-isolate) until at least 10 days have passed since your first positive COVID-19 test. (Date test collected)     2) Patient can get the booster for COVID-19 vaccine once she meets the guidelines above for ending home isolation.     Meenu Blair RN, BSN Nurse Triage Advisor 12:20 PM 11/26/2021

## 2021-11-26 NOTE — TELEPHONE ENCOUNTER
Reason for Call:  Other call back    Detailed comments: Patient has a few questions -  1. She is positive for COVID. After she is done quarantining and she is still positive, does she still have to quarantine or is she good to go back to work for school?   2. IF she is still positive, can she still get her booster as well?    Can call her back or send a msg through [employer] with a response.    Phone Number Patient can be reached at: Home number on file [phone] (home)    Best Time: ANY    Can we leave a detailed message on this number? YES    Call taken on 11/26/2021 at 8:29 AM by Vish Montelongo

## 2021-11-26 NOTE — TELEPHONE ENCOUNTER
RN triage   Call from pt   Pt states she tested positive for covid on 11/11 -- fever for a couple days and cough  And no taste/smell  Pt states she retested and is still positive covid on 11/23 --   Pt isolated for 2 weeks   Pt states cough is improved - breathing OK  - no more fevers - and taste and smell improving   Pt did reschedule her clinic appt     Reviewed protocol      Pt wants to know :  1.  Is she still  contagious - since she still has a positive covid test ?  2. Pt wants to know does she need to test negative before getting her booster ?    Please advise     Shyanne Park RN  [facility]  Triage Nurse Advisor    COVID 19 Nurse Triage Plan/Patient Instructions    Please be aware that novel coronavirus (COVID-19) may be circulating in the community. If you develop symptoms such as fever, cough, or SOB or if you have concerns about the presence of another infection including coronavirus (COVID-19), please contact your health care provider or visit [email or web address].     Disposition/Instructions    Home care recommended. Follow home care protocol based instructions.    Thank you for taking steps to prevent the spread of this virus.  o Limit your contact with others.  o Wear a simple mask to cover your cough.  o Wash your hands well and often.    Resources    [facility]: About COVID-19: [email or web address]    CDC: What to Do If You're Sick: www.cdc.gov/coronavirus/2019-ncov/about/steps-when-sick.html    CDC: Ending Home Isolation: www.cdc.gov/coronavirus/2019-ncov/hcp/disposition-in-home-patients.html     CDC: Caring for Someone: www.cdc.gov/coronavirus/2019-ncov/if-you-are-sick/care-for-someone.html     [facility]: Interim Guidance for Hospital Discharge to Home: www.health.[facility].[place].us/diseases/coronavirus/hcp/hospdischarge.pdf    [facility] clinical trials (COVID-19 research studies): [email or web address]     Below are the COVID-19 hotlines at the  [place] Department of Health ([facility]). Interpreters are available.   o For health questions: Call 962-807-3191 or 1-625.657.7315 (7 a.m. to 7 p.m.)  o For questions about schools and childcare: Call 860-212-9811 or 1-588.694.1207 (7 a.m. to 7 p.m.)                       Reason for Disposition    COVID-19 vaccine, Frequently Asked Questions (FAQs)    Protocols used: CORONAVIRUS (COVID-19) VACCINE QUESTIONS AND WPTBAYNOH-H-UP 8.25.2021

## 2021-11-29 NOTE — TELEPHONE ENCOUNTER
Please call patient.    As long as she quarantined for 2 weeks from her initial positive test (11/11), I do not think she is still contagious.  She can return to normal activities since her symptoms are improving.    She does not need a negative test to receive the booster.  She can get it as long as she is asymptomatic.    Mary Kate Duncan, KATE

## 2021-11-29 NOTE — TELEPHONE ENCOUNTER
Called and relayed message to patient's  Antoni.  verbalized understanding and will relay message over to patient as patient is currently at work.

## 2021-12-01 ENCOUNTER — IMMUNIZATION (OUTPATIENT)
Dept: NURSING | Facility: CLINIC | Age: 56
End: 2021-12-01
Payer: COMMERCIAL

## 2021-12-01 PROCEDURE — 0004A PR COVID VAC PFIZER DIL RECON 30 MCG/0.3 ML IM: CPT

## 2021-12-01 PROCEDURE — 91300 PR COVID VAC PFIZER DIL RECON 30 MCG/0.3 ML IM: CPT

## 2021-12-16 ENCOUNTER — OFFICE VISIT (OUTPATIENT)
Dept: FAMILY MEDICINE | Facility: CLINIC | Age: 56
End: 2021-12-16
Payer: COMMERCIAL

## 2021-12-16 VITALS
BODY MASS INDEX: 32.72 KG/M2 | DIASTOLIC BLOOD PRESSURE: 72 MMHG | WEIGHT: 208.9 LBS | HEART RATE: 76 BPM | SYSTOLIC BLOOD PRESSURE: 146 MMHG

## 2021-12-16 DIAGNOSIS — E11.9 TYPE 2 DIABETES MELLITUS WITHOUT COMPLICATION, WITHOUT LONG-TERM CURRENT USE OF INSULIN (H): Primary | ICD-10-CM

## 2021-12-16 DIAGNOSIS — F10.10 ALCOHOL ABUSE: ICD-10-CM

## 2021-12-16 DIAGNOSIS — R03.0 ELEVATED BLOOD PRESSURE READING WITHOUT DIAGNOSIS OF HYPERTENSION: ICD-10-CM

## 2021-12-16 DIAGNOSIS — R79.89 ELEVATED LFTS: ICD-10-CM

## 2021-12-16 DIAGNOSIS — E78.2 MIXED HYPERLIPIDEMIA: ICD-10-CM

## 2021-12-16 LAB
ALBUMIN SERPL-MCNC: 3.9 G/DL (ref 3.5–5)
ALP SERPL-CCNC: 124 U/L (ref 45–120)
ALT SERPL W P-5'-P-CCNC: 113 U/L (ref 0–45)
ANION GAP SERPL CALCULATED.3IONS-SCNC: 11 MMOL/L (ref 5–18)
AST SERPL W P-5'-P-CCNC: 190 U/L (ref 0–40)
BILIRUB SERPL-MCNC: 0.7 MG/DL (ref 0–1)
BUN SERPL-MCNC: 8 MG/DL (ref 8–22)
CALCIUM SERPL-MCNC: 9.6 MG/DL (ref 8.5–10.5)
CHLORIDE BLD-SCNC: 103 MMOL/L (ref 98–107)
CHOLEST SERPL-MCNC: 184 MG/DL
CO2 SERPL-SCNC: 25 MMOL/L (ref 22–31)
CREAT SERPL-MCNC: 0.64 MG/DL (ref 0.6–1.1)
CREAT UR-MCNC: 179 MG/DL
FASTING STATUS PATIENT QL REPORTED: YES
GFR SERPL CREATININE-BSD FRML MDRD: >90 ML/MIN/1.73M2
GLUCOSE BLD-MCNC: 136 MG/DL (ref 70–125)
HBA1C MFR BLD: 7.1 % (ref 0–5.6)
HDLC SERPL-MCNC: 58 MG/DL
LDLC SERPL CALC-MCNC: 100 MG/DL
MICROALBUMIN UR-MCNC: 4.95 MG/DL (ref 0–1.99)
MICROALBUMIN/CREAT UR: 27.7 MG/G CR
POTASSIUM BLD-SCNC: 4.2 MMOL/L (ref 3.5–5)
PROT SERPL-MCNC: 7.4 G/DL (ref 6–8)
SODIUM SERPL-SCNC: 139 MMOL/L (ref 136–145)
TRIGL SERPL-MCNC: 131 MG/DL

## 2021-12-16 PROCEDURE — 36415 COLL VENOUS BLD VENIPUNCTURE: CPT | Performed by: NURSE PRACTITIONER

## 2021-12-16 PROCEDURE — 80053 COMPREHEN METABOLIC PANEL: CPT | Performed by: NURSE PRACTITIONER

## 2021-12-16 PROCEDURE — 80061 LIPID PANEL: CPT | Performed by: NURSE PRACTITIONER

## 2021-12-16 PROCEDURE — 82043 UR ALBUMIN QUANTITATIVE: CPT | Performed by: NURSE PRACTITIONER

## 2021-12-16 PROCEDURE — 83036 HEMOGLOBIN GLYCOSYLATED A1C: CPT | Performed by: NURSE PRACTITIONER

## 2021-12-16 PROCEDURE — 99214 OFFICE O/P EST MOD 30 MIN: CPT | Performed by: NURSE PRACTITIONER

## 2021-12-20 PROBLEM — R79.89 ELEVATED LFTS: Status: ACTIVE | Noted: 2021-12-20

## 2021-12-20 RX ORDER — GLIPIZIDE 2.5 MG/1
TABLET, EXTENDED RELEASE ORAL
Qty: 180 TABLET | Refills: 0 | Status: SHIPPED | OUTPATIENT
Start: 2021-12-20 | End: 2022-03-23

## 2021-12-20 NOTE — PROGRESS NOTES
"  Assessment & Plan     Type 2 diabetes mellitus without complication, without long-term current use of insulin (H)  Hgb A1c is elevated at 7.1.  Concerns restarting Metformin due to her elevated LFTs and risk of lactic acidosis.  I do think we need to treat this, however.  Will start Glipizide once daily with food.     - HEMOGLOBIN A1C  - Albumin Random Urine Quantitative with Creat Ratio  - glipiZIDE (GLUCOTROL XL) 2.5 MG 24 hr tablet  Dispense: 180 tablet; Refill: 0    Mixed hyperlipidemia  - Lipid panel reflex to direct LDL Fasting    Alcohol abuse  Patient continue to work on sobriety.  She may be seeking inpatient rehab next year.     Elevated LFTs  Secondary to alcohol use.   - Comprehensive metabolic panel (BMP + Alb, Alk Phos, ALT, AST, Total. Bili, TP)    Elevated blood pressure reading without diagnosis of hypertension  Suspect this is due to recent use of Sudafed.  Recommend limited use.          BMI:   Estimated body mass index is 32.72 kg/m  as calculated from the following:    Height as of 5/12/21: 1.702 m (5' 7\").    Weight as of this encounter: 94.8 kg (208 lb 14.4 oz).       Return in about 3 months (around 3/16/2022).    Mary Kate Duncan NP  [facility]    Lori Jama is a 56 year old who presents for follow up.      Patient continues to struggle with alcohol.  She is drinking every evening.  Still seeing her therapist, which is helpful.  Considering an inpatient rehab possible next year.  She does not think that outpatient treatment would work very well for her.  She would like to recheck her LFTs today, as they have been elevated.  She suspect they will be elevated again today.  The did decrease when she was drinking less.  Denies any nausea or abdominal pain.    History of Type 2 Diabetes.  Not currently on Metformin due to her LFTs.    Patient has been taking Sudafed for a chronically runny nose.     Review of Systems   Pertinent items in HPI      Objective "    BP (!) 146/72   Pulse 76   Wt 94.8 kg (208 lb 14.4 oz)   BMI 32.72 kg/m    Body mass index is 32.72 kg/m .  Physical Exam   GENERAL: healthy, alert and no distress  RESP: lungs clear to auscultation - no rales, rhonchi or wheezes  CV: regular rate and rhythm, normal S1 S2, no S3 or S4, no murmur, click or rub, no peripheral edema

## 2022-03-22 DIAGNOSIS — E11.9 TYPE 2 DIABETES MELLITUS WITHOUT COMPLICATION, WITHOUT LONG-TERM CURRENT USE OF INSULIN (H): ICD-10-CM

## 2022-03-23 RX ORDER — GLIPIZIDE 2.5 MG/1
TABLET, EXTENDED RELEASE ORAL
Qty: 180 TABLET | Refills: 0 | Status: SHIPPED | OUTPATIENT
Start: 2022-03-23 | End: 2022-06-20

## 2022-03-23 NOTE — TELEPHONE ENCOUNTER
"Last Written Prescription Date:  12/20/21  Last Fill Quantity: 180,  # refills: 0   Last office visit provider:  12/16/21     Requested Prescriptions   Pending Prescriptions Disp Refills     glipiZIDE (GLUCOTROL XL) 2.5 MG 24 hr tablet [Pharmacy Med Name: GLIPIZIDE ER 2.5MG TABLETS] 180 tablet 0     Sig: TAKE 1 TABLET BY MOUTH EVERY DAY X 7 DAYS, THEN INCREASE TO 2 TABLETS BY MOUTH EVERY DAY WITH FOOD       Sulfonylurea Agents Passed - 3/23/2022  8:36 AM        Passed - Patient has documented A1c within the specified period of time.     If HgbA1C is 8 or greater, it needs to be on file within the past 3 months.  If less than 8, must be on file within the past 6 months.     Recent Labs   Lab Test 12/16/21 0914   A1C 7.1*             Passed - Medication is active on med list        Passed - Patient is age 18 or older        Passed - No active pregnancy on record        Passed - Patient has a recent creatinine (normal) within the past 12 mos.     Recent Labs   Lab Test 12/16/21 0914   CR 0.64       Ok to refill medication if creatinine is low          Passed - Patient has not had a positive pregnancy test within the past 12 mos.        Passed - Recent (6 mo) or future (30 days) visit within the authorizing provider's specialty     Patient had office visit in the last 6 months or has a visit in the next 30 days with authorizing provider or within the authorizing provider's specialty.  See \"Patient Info\" tab in inbasket, or \"Choose Columns\" in Meds & Orders section of the refill encounter.                 Noel Olguin RN 03/23/22 8:36 AM  "

## 2022-03-27 ENCOUNTER — HEALTH MAINTENANCE LETTER (OUTPATIENT)
Age: 57
End: 2022-03-27

## 2022-04-07 ENCOUNTER — TELEPHONE (OUTPATIENT)
Dept: FAMILY MEDICINE | Facility: CLINIC | Age: 57
End: 2022-04-07
Payer: COMMERCIAL

## 2022-04-07 DIAGNOSIS — E11.9 TYPE 2 DIABETES MELLITUS WITHOUT COMPLICATION, WITHOUT LONG-TERM CURRENT USE OF INSULIN (H): Primary | ICD-10-CM

## 2022-04-07 RX ORDER — LANCETS
EACH MISCELLANEOUS
Qty: 100 EACH | Refills: 6 | Status: SHIPPED | OUTPATIENT
Start: 2022-04-07

## 2022-04-07 RX ORDER — GLUCOSAMINE HCL/CHONDROITIN SU 500-400 MG
CAPSULE ORAL
Qty: 100 EACH | Refills: 3 | Status: SHIPPED | OUTPATIENT
Start: 2022-04-07

## 2022-04-07 NOTE — TELEPHONE ENCOUNTER
Reason for Call:  Other call back and prescription    Detailed comments: Pt is wondering if she could get a diabetic monitor and wondering if that is something she can get to monitor her blood sugar? Please advise.    Phone Number Patient can be reached at: Home number on file [phone] (home)    Best Time: any    Can we leave a detailed message on this number? YES    Call taken on 4/7/2022 at 10:18 AM by Vish Montelongo

## 2022-05-03 ENCOUNTER — IMMUNIZATION (OUTPATIENT)
Dept: NURSING | Facility: CLINIC | Age: 57
End: 2022-05-03
Payer: COMMERCIAL

## 2022-05-03 PROCEDURE — 91305 COVID-19,PF,PFIZER (12+ YRS): CPT

## 2022-05-03 PROCEDURE — 0054A COVID-19,PF,PFIZER (12+ YRS): CPT

## 2022-06-16 PROBLEM — Z86.0100 HISTORY OF COLONIC POLYPS: Status: ACTIVE | Noted: 2021-02-01

## 2022-06-16 PROBLEM — D12.3 BENIGN NEOPLASM OF TRANSVERSE COLON: Status: ACTIVE | Noted: 2021-02-01

## 2022-06-16 PROBLEM — K57.30 DIVERTICULAR DISEASE OF LARGE INTESTINE: Status: ACTIVE | Noted: 2021-01-28

## 2022-06-16 PROBLEM — K63.5 POLYP OF COLON: Status: ACTIVE | Noted: 2021-01-28

## 2022-06-21 DIAGNOSIS — E11.9 TYPE 2 DIABETES MELLITUS WITHOUT COMPLICATION, WITHOUT LONG-TERM CURRENT USE OF INSULIN (H): ICD-10-CM

## 2022-06-21 RX ORDER — GLIPIZIDE 5 MG/1
TABLET, FILM COATED, EXTENDED RELEASE ORAL
Qty: 90 TABLET | OUTPATIENT
Start: 2022-06-21

## 2022-06-27 ENCOUNTER — TELEPHONE (OUTPATIENT)
Dept: FAMILY MEDICINE | Facility: CLINIC | Age: 57
End: 2022-06-27

## 2022-06-27 DIAGNOSIS — I10 ESSENTIAL HYPERTENSION WITH GOAL BLOOD PRESSURE LESS THAN 130/80: ICD-10-CM

## 2022-06-27 RX ORDER — LISINOPRIL 40 MG/1
40 TABLET ORAL DAILY
Qty: 90 TABLET | Refills: 0 | Status: SHIPPED | OUTPATIENT
Start: 2022-06-27 | End: 2022-07-28

## 2022-06-27 NOTE — TELEPHONE ENCOUNTER
Reason for call:  Medication   If this is a refill request, has the caller requested the refill from the pharmacy already? No  Will the patient be using a [facility]? YES  Name of the pharmacy and phone number for the current request: [id number] [address]     Name of the medication requested: lisinopril (ZESTRIL) 40 MG     Other request: Patient is calling because the pharmacy states her glipiZIDE (GLUCOTROL XL) 5 MG was refilled, but patient is not taking that medication and she is in need of the lisinopril (ZESTRIL) 40 MG refilled. Patient would like it noted she is not taking the glipiZIDE (GLUCOTROL XL) 5 MG       Phone number to reach patient:  Home number on file [phone] (home)    Best Time:  Any time    Can we leave a detailed message on this number?  YES    Travel screening: Not Applicable

## 2022-07-12 ENCOUNTER — TRANSFERRED RECORDS (OUTPATIENT)
Dept: HEALTH INFORMATION MANAGEMENT | Facility: CLINIC | Age: 57
End: 2022-07-12

## 2022-07-12 LAB — RETINOPATHY: NEGATIVE

## 2022-07-17 ENCOUNTER — HEALTH MAINTENANCE LETTER (OUTPATIENT)
Age: 57
End: 2022-07-17

## 2022-07-21 ENCOUNTER — MYC MEDICAL ADVICE (OUTPATIENT)
Dept: FAMILY MEDICINE | Facility: CLINIC | Age: 57
End: 2022-07-21

## 2022-07-21 DIAGNOSIS — E11.29 TYPE 2 DIABETES MELLITUS WITH MICROALBUMINURIA, WITHOUT LONG-TERM CURRENT USE OF INSULIN (H): Primary | ICD-10-CM

## 2022-07-21 DIAGNOSIS — R80.9 TYPE 2 DIABETES MELLITUS WITH MICROALBUMINURIA, WITHOUT LONG-TERM CURRENT USE OF INSULIN (H): Primary | ICD-10-CM

## 2022-07-21 NOTE — TELEPHONE ENCOUNTER
Please see MyChart from patient needing PCP review.       Maria Mcfadden RN  [facility]

## 2022-07-27 ENCOUNTER — LAB (OUTPATIENT)
Dept: LAB | Facility: CLINIC | Age: 57
End: 2022-07-27
Payer: COMMERCIAL

## 2022-07-27 DIAGNOSIS — R80.9 TYPE 2 DIABETES MELLITUS WITH MICROALBUMINURIA, WITHOUT LONG-TERM CURRENT USE OF INSULIN (H): ICD-10-CM

## 2022-07-27 DIAGNOSIS — E11.29 TYPE 2 DIABETES MELLITUS WITH MICROALBUMINURIA, WITHOUT LONG-TERM CURRENT USE OF INSULIN (H): ICD-10-CM

## 2022-07-27 LAB
ALBUMIN SERPL BCG-MCNC: 4.7 G/DL (ref 3.5–5.2)
ALP SERPL-CCNC: 126 U/L (ref 35–104)
ALT SERPL W P-5'-P-CCNC: 93 U/L (ref 10–35)
ANION GAP SERPL CALCULATED.3IONS-SCNC: 10 MMOL/L (ref 7–15)
AST SERPL W P-5'-P-CCNC: 128 U/L (ref 10–35)
BILIRUB SERPL-MCNC: 0.6 MG/DL
BUN SERPL-MCNC: 9.2 MG/DL (ref 6–20)
CALCIUM SERPL-MCNC: 10 MG/DL (ref 8.6–10)
CHLORIDE SERPL-SCNC: 97 MMOL/L (ref 98–107)
CREAT SERPL-MCNC: 0.64 MG/DL (ref 0.51–0.95)
DEPRECATED HCO3 PLAS-SCNC: 28 MMOL/L (ref 22–29)
GFR SERPL CREATININE-BSD FRML MDRD: >90 ML/MIN/1.73M2
GLUCOSE SERPL-MCNC: 142 MG/DL (ref 70–99)
HBA1C MFR BLD: 6.4 % (ref 0–5.6)
POTASSIUM SERPL-SCNC: 3.6 MMOL/L (ref 3.4–5.3)
PROT SERPL-MCNC: 7.9 G/DL (ref 6.4–8.3)
SODIUM SERPL-SCNC: 135 MMOL/L (ref 136–145)

## 2022-07-27 PROCEDURE — 80053 COMPREHEN METABOLIC PANEL: CPT

## 2022-07-27 PROCEDURE — 83036 HEMOGLOBIN GLYCOSYLATED A1C: CPT

## 2022-07-27 PROCEDURE — 36415 COLL VENOUS BLD VENIPUNCTURE: CPT

## 2022-07-28 ENCOUNTER — OFFICE VISIT (OUTPATIENT)
Dept: FAMILY MEDICINE | Facility: CLINIC | Age: 57
End: 2022-07-28
Payer: COMMERCIAL

## 2022-07-28 VITALS
BODY MASS INDEX: 31.67 KG/M2 | SYSTOLIC BLOOD PRESSURE: 124 MMHG | WEIGHT: 202.2 LBS | DIASTOLIC BLOOD PRESSURE: 68 MMHG | HEART RATE: 74 BPM

## 2022-07-28 DIAGNOSIS — I10 ESSENTIAL HYPERTENSION WITH GOAL BLOOD PRESSURE LESS THAN 130/80: ICD-10-CM

## 2022-07-28 DIAGNOSIS — R79.89 ELEVATED LFTS: Primary | ICD-10-CM

## 2022-07-28 DIAGNOSIS — R80.9 TYPE 2 DIABETES MELLITUS WITH MICROALBUMINURIA, WITHOUT LONG-TERM CURRENT USE OF INSULIN (H): ICD-10-CM

## 2022-07-28 DIAGNOSIS — E11.29 TYPE 2 DIABETES MELLITUS WITH MICROALBUMINURIA, WITHOUT LONG-TERM CURRENT USE OF INSULIN (H): ICD-10-CM

## 2022-07-28 DIAGNOSIS — T75.3XXS MOTION SICKNESS, SEQUELA: ICD-10-CM

## 2022-07-28 PROCEDURE — 99214 OFFICE O/P EST MOD 30 MIN: CPT | Performed by: NURSE PRACTITIONER

## 2022-07-28 RX ORDER — LISINOPRIL 40 MG/1
40 TABLET ORAL DAILY
Qty: 90 TABLET | Refills: 1 | Status: SHIPPED | OUTPATIENT
Start: 2022-07-28 | End: 2023-05-05

## 2022-07-28 RX ORDER — SCOLOPAMINE TRANSDERMAL SYSTEM 1 MG/1
1 PATCH, EXTENDED RELEASE TRANSDERMAL
Qty: 4 PATCH | Refills: 1 | Status: SHIPPED | OUTPATIENT
Start: 2022-07-28 | End: 2022-12-08

## 2022-07-28 NOTE — PROGRESS NOTES
"  Assessment & Plan     Essential hypertension with goal blood pressure less than 130/80  Optimal control on Lisinopril.  - lisinopril (ZESTRIL) 40 MG tablet  Dispense: 90 tablet; Refill: 1    Elevated LFTs  This is chronic.  History of alcohol abuse and fatty liver on last US in 2015.  Unclear whether patient has had a formal GI workup for this.  I do recommend this.  Referral placed.  Recommend continued work on alcohol abstinence.   - Adult GI  Referral - Consult Only    Motion sickness, sequela  - scopolamine (TRANSDERM) 1 MG/3DAYS 72 hr patch  Dispense: 4 patch; Refill: 1    Type 2 diabetes mellitus with microalbuminuria, without long-term current use of insulin (H)  Hgb A1c 6.4 without medication.  Did not tolerate Glipizide.  Hesitant about Metformin given elevated LFTs.  Will just monitor for now without medication.         Return in about 6 months (around 1/28/2023).    Mary Kate Duncan NP  [facility]    Lori Jama is a 56 year old who presents for follow-up.  She had labs completed prior to this visit.    History of type 2 diabetes.  She is not currently on any medication for this.  She did try glipizide after her last visit, but this made her feel tired and \"weird in the head\".  She is not on Metformin due to her chronically elevated liver enzymes.  Her recent hemoglobin A1c was 6.4.  She is not checking blood sugars at all at home.    History of chronically elevated LFTs.  There is a family history of cirrhosis of the liver.  Patient does drink alcohol, and continues to work on abstaining.  She is working with a counselor regarding this.  Looks like she had an ultrasound in 2015, which showed a fatty liver.  Patient believes she has seen GI in the past for a work-up, but we do not have these records.    Requesting a prescription for scopolamine to use for motion sickness.  She has some upcoming air travel, and tends to struggle with this.  She has used in " the past.    Review of Systems   Pertinent items in HPI      Objective    /68 (BP Location: Right arm, Patient Position: Sitting, Cuff Size: Adult Regular)   Pulse 74   Wt 91.7 kg (202 lb 3.2 oz)   BMI 31.67 kg/m    Body mass index is 31.67 kg/m .  Physical Exam   GENERAL: healthy, alert and no distress  RESP: lungs clear to auscultation - no rales, rhonchi or wheezes  CV: regular rate and rhythm, normal S1 S2, no S3 or S4, no murmur, click or rub, no peripheral edema

## 2022-08-16 DIAGNOSIS — J30.9 ALLERGIC RHINITIS: ICD-10-CM

## 2022-08-16 RX ORDER — FLUTICASONE PROPIONATE 50 MCG
SPRAY, SUSPENSION (ML) NASAL
Qty: 48 G | Refills: 3 | Status: SHIPPED | OUTPATIENT
Start: 2022-08-16 | End: 2023-08-08

## 2022-08-17 NOTE — TELEPHONE ENCOUNTER
"Last Written Prescription Date:  12/29/20  Last Fill Quantity: 48,  # refills: 3   Last office visit provider:  7/28/22     Requested Prescriptions   Pending Prescriptions Disp Refills     fluticasone (FLONASE) 50 MCG/ACT nasal spray [Pharmacy Med Name: FLUTICASONE 50MCG NASAL SP (120) RX] 48 g 3     Sig: SHAKE AND INSTILL 2 SPRAYS IN EACH NOSTRIL EVERY DAY AS NEEDED       Nasal Allergy Protocol Passed - 8/16/2022 11:26 AM        Passed - Patient is age 12 or older        Passed - Recent (12 mo) or future (30 days) visit within the authorizing provider's specialty     Patient has had an office visit with the authorizing provider or a provider within the authorizing providers department within the previous 12 mos or has a future within next 30 days. See \"Patient Info\" tab in inbasket, or \"Choose Columns\" in Meds & Orders section of the refill encounter.              Passed - Medication is active on med list             Nadya Malik RN 08/16/22 8:24 PM  "

## 2022-09-24 ENCOUNTER — HEALTH MAINTENANCE LETTER (OUTPATIENT)
Age: 57
End: 2022-09-24

## 2022-09-28 ENCOUNTER — IMMUNIZATION (OUTPATIENT)
Dept: NURSING | Facility: CLINIC | Age: 57
End: 2022-09-28
Payer: COMMERCIAL

## 2022-09-28 PROCEDURE — 0124A COVID-19,PF,PFIZER BOOSTER BIVALENT: CPT

## 2022-09-28 PROCEDURE — 91312 COVID-19,PF,PFIZER BOOSTER BIVALENT: CPT

## 2022-09-28 PROCEDURE — 90682 RIV4 VACC RECOMBINANT DNA IM: CPT

## 2022-09-28 PROCEDURE — 90471 IMMUNIZATION ADMIN: CPT

## 2022-11-17 ENCOUNTER — TRANSFERRED RECORDS (OUTPATIENT)
Dept: HEALTH INFORMATION MANAGEMENT | Facility: CLINIC | Age: 57
End: 2022-11-17

## 2022-11-17 LAB
ALT SERPL-CCNC: 83 IU/L (ref 0–32)
AST SERPL-CCNC: 166 IU/L (ref 0–40)
CREATININE (EXTERNAL): 0.68 MG/DL (ref 0.57–1)
GFR ESTIMATED (EXTERNAL): 102 ML/MIN/1.73
GLUCOSE (EXTERNAL): 133 MG/DL (ref 70–99)
HEP C HIM: NORMAL
INR (EXTERNAL): 1.1 (ref 0.9–1.2)
POTASSIUM (EXTERNAL): 4.1 MMOL/L (ref 3.5–5.2)

## 2022-11-28 ENCOUNTER — TRANSFERRED RECORDS (OUTPATIENT)
Dept: HEALTH INFORMATION MANAGEMENT | Facility: CLINIC | Age: 57
End: 2022-11-28

## 2022-12-07 ENCOUNTER — HOSPITAL ENCOUNTER (OUTPATIENT)
Dept: MAMMOGRAPHY | Facility: CLINIC | Age: 57
Discharge: HOME | End: 2022-12-07
Attending: NURSE PRACTITIONER | Admitting: NURSE PRACTITIONER
Payer: COMMERCIAL

## 2022-12-07 DIAGNOSIS — Z12.31 VISIT FOR SCREENING MAMMOGRAM: ICD-10-CM

## 2022-12-07 PROCEDURE — 77067 SCR MAMMO BI INCL CAD: CPT

## 2022-12-08 ENCOUNTER — OFFICE VISIT (OUTPATIENT)
Dept: FAMILY MEDICINE | Facility: CLINIC | Age: 57
End: 2022-12-08
Payer: COMMERCIAL

## 2022-12-08 ENCOUNTER — HOSPITAL ENCOUNTER (OUTPATIENT)
Dept: ULTRASOUND IMAGING | Facility: CLINIC | Age: 57
Discharge: HOME | End: 2022-12-08
Attending: INTERNAL MEDICINE | Admitting: INTERNAL MEDICINE
Payer: COMMERCIAL

## 2022-12-08 VITALS
BODY MASS INDEX: 31.52 KG/M2 | SYSTOLIC BLOOD PRESSURE: 120 MMHG | TEMPERATURE: 98.4 F | HEART RATE: 82 BPM | WEIGHT: 201.25 LBS | DIASTOLIC BLOOD PRESSURE: 82 MMHG | RESPIRATION RATE: 16 BRPM

## 2022-12-08 DIAGNOSIS — Z71.3 DIETARY COUNSELING AND SURVEILLANCE: ICD-10-CM

## 2022-12-08 DIAGNOSIS — N28.1 COMPLEX RENAL CYST: Primary | ICD-10-CM

## 2022-12-08 DIAGNOSIS — R74.8 ELEVATED LIVER ENZYMES: ICD-10-CM

## 2022-12-08 DIAGNOSIS — E78.2 MIXED HYPERLIPIDEMIA: ICD-10-CM

## 2022-12-08 DIAGNOSIS — F10.10 CHRONIC ALCOHOL ABUSE: ICD-10-CM

## 2022-12-08 DIAGNOSIS — E11.9 TYPE 2 DIABETES MELLITUS WITHOUT COMPLICATION, WITHOUT LONG-TERM CURRENT USE OF INSULIN (H): Primary | ICD-10-CM

## 2022-12-08 DIAGNOSIS — Z12.31 VISIT FOR SCREENING MAMMOGRAM: ICD-10-CM

## 2022-12-08 DIAGNOSIS — T75.3XXS MOTION SICKNESS, SEQUELA: ICD-10-CM

## 2022-12-08 DIAGNOSIS — I10 ESSENTIAL HYPERTENSION: ICD-10-CM

## 2022-12-08 DIAGNOSIS — Z11.4 SCREENING FOR HIV (HUMAN IMMUNODEFICIENCY VIRUS): ICD-10-CM

## 2022-12-08 DIAGNOSIS — I10 ESSENTIAL (PRIMARY) HYPERTENSION: ICD-10-CM

## 2022-12-08 LAB
CHOLEST SERPL-MCNC: 221 MG/DL
HBA1C MFR BLD: 6.6 % (ref 0–5.6)
HDLC SERPL-MCNC: 59 MG/DL
HIV 1+2 AB+HIV1 P24 AG SERPL QL IA: NONREACTIVE
LDLC SERPL CALC-MCNC: 132 MG/DL
NONHDLC SERPL-MCNC: 162 MG/DL
TRIGL SERPL-MCNC: 150 MG/DL

## 2022-12-08 PROCEDURE — 83036 HEMOGLOBIN GLYCOSYLATED A1C: CPT | Performed by: NURSE PRACTITIONER

## 2022-12-08 PROCEDURE — 87389 HIV-1 AG W/HIV-1&-2 AB AG IA: CPT | Performed by: NURSE PRACTITIONER

## 2022-12-08 PROCEDURE — 76700 US EXAM ABDOM COMPLETE: CPT

## 2022-12-08 PROCEDURE — 99207 PR FOOT EXAM NO CHARGE: CPT | Performed by: NURSE PRACTITIONER

## 2022-12-08 PROCEDURE — 99214 OFFICE O/P EST MOD 30 MIN: CPT | Performed by: NURSE PRACTITIONER

## 2022-12-08 PROCEDURE — 80061 LIPID PANEL: CPT | Performed by: NURSE PRACTITIONER

## 2022-12-08 PROCEDURE — 36415 COLL VENOUS BLD VENIPUNCTURE: CPT | Performed by: NURSE PRACTITIONER

## 2022-12-08 RX ORDER — SCOLOPAMINE TRANSDERMAL SYSTEM 1 MG/1
1 PATCH, EXTENDED RELEASE TRANSDERMAL
Qty: 4 PATCH | Refills: 1 | Status: SHIPPED | OUTPATIENT
Start: 2022-12-08

## 2022-12-08 RX ORDER — NALTREXONE HYDROCHLORIDE 50 MG/1
50 TABLET, FILM COATED ORAL DAILY
COMMUNITY

## 2022-12-08 NOTE — PROGRESS NOTES
"  Assessment & Plan     Type 2 diabetes mellitus without complication, without long-term current use of insulin (H)  Hgb A1c of 6.6.  Will continue off medication at this time.  Recheck in 6 months.   - Albumin Random Urine Quantitative with Creat Ratio  - FOOT EXAM  - Hemoglobin A1c    Mixed hyperlipidemia  - Lipid panel reflex to direct LDL Non-fasting    Motion sickness, sequela  Uses for air travel.   - scopolamine (TRANSDERM) 1 MG/3DAYS 72 hr patch  Dispense: 4 patch; Refill: 1    Screening for HIV (human immunodeficiency virus)  - HIV Antigen Antibody Combo    Hypertension  Well controlled on Lisinopril    Visit for screening mammogram  - *MA Screening Digital Bilateral    Elevated liver enzymes  Secondary to alcohol use.  Following with GI.  On Naltrexone.  Scheduled or US of the liver today.            Return in about 6 months (around 6/8/2023).    Mary Kate Duncan NP  [facility]    Lori Jama is a 57 year old who presents for diabetic check.  Patient is not on any diabetic medications at this time.  Her last hemoglobin A1c was 6.4.  She was on glipizide, but did not tolerate.  Concerns with having on metformin due to liver disease.    Patient has a history of alcohol use and elevated liver enzymes.  She did consult with GI this year.  She is scheduled for an abdominal ultrasound later today and possibly additional scans next year with liver biopsy.  Her elevated liver enzymes is thought to be due to her alcohol use.  Gastroenterology started her on naltrexone.  She took her first dose last night.  States she felt \"weird in the head\", but did not have any alcohol cravings.  She usually has most of her cravings and use in the evening after work.    Patient is looking for a new counselor as her previous one has left.  She is also considering doing some marriage counseling.    Denies any other complaints or concerns today.    Review of Systems   Pertinent items in HPI    "   Objective    /82 (BP Location: Right arm, Patient Position: Sitting, Cuff Size: Adult Large)   Pulse 82   Temp 98.4  F (36.9  C) (Oral)   Resp 16   Wt 91.3 kg (201 lb 4 oz)   BMI 31.52 kg/m    Body mass index is 31.52 kg/m .  Physical Exam   GENERAL: healthy, alert and no distress  RESP: lungs clear to auscultation - no rales, rhonchi or wheezes  CV: regular rate and rhythm, normal S1 S2, no S3 or S4, no murmur, click or rub, no peripheral edema  Diabetic foot exam: normal DP and PT pulses, no trophic changes or ulcerative lesions and normal sensory exam

## 2022-12-20 ENCOUNTER — TRANSFERRED RECORDS (OUTPATIENT)
Dept: HEALTH INFORMATION MANAGEMENT | Facility: CLINIC | Age: 57
End: 2022-12-20

## 2023-01-10 ENCOUNTER — TRANSFERRED RECORDS (OUTPATIENT)
Dept: HEALTH INFORMATION MANAGEMENT | Facility: CLINIC | Age: 58
End: 2023-01-10

## 2023-01-10 LAB — RETINOPATHY: NEGATIVE

## 2023-01-13 ENCOUNTER — HOSPITAL ENCOUNTER (OUTPATIENT)
Dept: CT IMAGING | Facility: CLINIC | Age: 58
Discharge: HOME | End: 2023-01-13
Attending: NURSE PRACTITIONER | Admitting: NURSE PRACTITIONER
Payer: COMMERCIAL

## 2023-01-13 DIAGNOSIS — N28.1 COMPLEX RENAL CYST: ICD-10-CM

## 2023-01-13 PROCEDURE — 250N000011 HC RX IP 250 OP 636: Performed by: NURSE PRACTITIONER

## 2023-01-13 PROCEDURE — 74178 CT ABD&PLV WO CNTR FLWD CNTR: CPT

## 2023-01-13 RX ORDER — IOPAMIDOL 755 MG/ML
90 INJECTION, SOLUTION INTRAVASCULAR ONCE
Status: COMPLETED | OUTPATIENT
Start: 2023-01-13 | End: 2023-01-13

## 2023-01-13 RX ADMIN — IOPAMIDOL 90 ML: 755 INJECTION, SOLUTION INTRAVENOUS at 06:58

## 2023-01-18 DIAGNOSIS — N28.1 COMPLEX RENAL CYST: Primary | ICD-10-CM

## 2023-05-05 DIAGNOSIS — I10 ESSENTIAL HYPERTENSION WITH GOAL BLOOD PRESSURE LESS THAN 130/80: ICD-10-CM

## 2023-05-05 RX ORDER — LISINOPRIL 40 MG/1
40 TABLET ORAL DAILY
Qty: 90 TABLET | Refills: 2 | Status: SHIPPED | OUTPATIENT
Start: 2023-05-05 | End: 2024-04-22

## 2023-05-05 NOTE — TELEPHONE ENCOUNTER
"Last Written Prescription Date:  7/28/22  Last Fill Quantity: 90,  # refills: 1   Last office visit provider:  12/8/22     Requested Prescriptions   Pending Prescriptions Disp Refills     lisinopril (ZESTRIL) 40 MG tablet [Pharmacy Med Name: LISINOPRIL 40MG TABLETS] 90 tablet 1     Sig: TAKE 1 TABLET(40 MG) BY MOUTH DAILY       ACE Inhibitors (Including Combos) Protocol Passed - 5/5/2023  3:30 PM        Passed - Blood pressure under 140/90 in past 12 months     BP Readings from Last 3 Encounters:   12/08/22 120/82   07/28/22 124/68   12/16/21 (!) 146/72                 Passed - Recent (12 mo) or future (30 days) visit within the authorizing provider's specialty     Patient has had an office visit with the authorizing provider or a provider within the authorizing providers department within the previous 12 mos or has a future within next 30 days. See \"Patient Info\" tab in inbasket, or \"Choose Columns\" in Meds & Orders section of the refill encounter.              Passed - Medication is active on med list        Passed - Patient is age 18 or older        Passed - No active pregnancy on record        Passed - Normal serum creatinine on file in past 12 months     Recent Labs   Lab Test 07/27/22  0821   CR 0.64       Ok to refill medication if creatinine is low          Passed - Normal serum potassium on file in past 12 months     Recent Labs   Lab Test 11/17/22  0935 07/27/22  0821   POTASSIUM  --  3.6   52411 4.1  --              Passed - No positive pregnancy test within past 12 months             Noel Olguin RN 05/05/23 3:31 PM  "

## 2023-05-08 ENCOUNTER — HEALTH MAINTENANCE LETTER (OUTPATIENT)
Age: 58
End: 2023-05-08

## 2023-06-26 ENCOUNTER — TRANSFERRED RECORDS (OUTPATIENT)
Dept: HEALTH INFORMATION MANAGEMENT | Facility: CLINIC | Age: 58
End: 2023-06-26
Payer: COMMERCIAL

## 2023-06-26 LAB
ALT SERPL-CCNC: 57 IU/L (ref 0–32)
AST SERPL-CCNC: 67 IU/L (ref 0–40)
CREATININE (EXTERNAL): 0.6 MG/DL (ref 0.57–1)
GFR ESTIMATED (EXTERNAL): 105 ML/MIN/1.73
GLUCOSE (EXTERNAL): 144 MG/DL (ref 70–99)
INR (EXTERNAL): 1 (ref 0.9–1.2)
POTASSIUM (EXTERNAL): 4.3 MMOL/L (ref 3.5–5.2)

## 2023-07-18 ENCOUNTER — TRANSFERRED RECORDS (OUTPATIENT)
Dept: HEALTH INFORMATION MANAGEMENT | Facility: CLINIC | Age: 58
End: 2023-07-18
Payer: COMMERCIAL

## 2023-07-18 LAB — RETINOPATHY: NEGATIVE

## 2023-08-03 DIAGNOSIS — I10 ESSENTIAL HYPERTENSION WITH GOAL BLOOD PRESSURE LESS THAN 130/80: ICD-10-CM

## 2023-08-03 RX ORDER — LISINOPRIL 40 MG/1
40 TABLET ORAL DAILY
Qty: 90 TABLET | Refills: 2 | OUTPATIENT
Start: 2023-08-03

## 2023-08-05 ENCOUNTER — HEALTH MAINTENANCE LETTER (OUTPATIENT)
Age: 58
End: 2023-08-05

## 2023-08-06 ASSESSMENT — ENCOUNTER SYMPTOMS
FEVER: 0
FREQUENCY: 0
HEMATOCHEZIA: 0
SHORTNESS OF BREATH: 0
ARTHRALGIAS: 0
JOINT SWELLING: 0
EYE PAIN: 0
DYSURIA: 0
DIARRHEA: 0
DIZZINESS: 0
SORE THROAT: 0
HEARTBURN: 0
MYALGIAS: 0
NERVOUS/ANXIOUS: 1
NAUSEA: 0
HEADACHES: 0
HEMATURIA: 0
PALPITATIONS: 0
BREAST MASS: 0
CONSTIPATION: 0
CHILLS: 0
ABDOMINAL PAIN: 0
WEAKNESS: 0
PARESTHESIAS: 0
COUGH: 0

## 2023-08-08 ENCOUNTER — OFFICE VISIT (OUTPATIENT)
Dept: FAMILY MEDICINE | Facility: CLINIC | Age: 58
End: 2023-08-08
Payer: COMMERCIAL

## 2023-08-08 VITALS
DIASTOLIC BLOOD PRESSURE: 78 MMHG | SYSTOLIC BLOOD PRESSURE: 156 MMHG | HEIGHT: 67 IN | BODY MASS INDEX: 31.55 KG/M2 | RESPIRATION RATE: 16 BRPM | HEART RATE: 66 BPM | OXYGEN SATURATION: 99 % | TEMPERATURE: 98 F | WEIGHT: 201 LBS

## 2023-08-08 DIAGNOSIS — N28.1 COMPLEX RENAL CYST: ICD-10-CM

## 2023-08-08 DIAGNOSIS — R80.9 TYPE 2 DIABETES MELLITUS WITH MICROALBUMINURIA, WITHOUT LONG-TERM CURRENT USE OF INSULIN (H): ICD-10-CM

## 2023-08-08 DIAGNOSIS — E78.2 MIXED HYPERLIPIDEMIA: ICD-10-CM

## 2023-08-08 DIAGNOSIS — E66.01 MORBID OBESITY (H): ICD-10-CM

## 2023-08-08 DIAGNOSIS — J30.1 ALLERGIC RHINITIS DUE TO POLLEN, UNSPECIFIED SEASONALITY: ICD-10-CM

## 2023-08-08 DIAGNOSIS — E11.29 TYPE 2 DIABETES MELLITUS WITH MICROALBUMINURIA, WITHOUT LONG-TERM CURRENT USE OF INSULIN (H): ICD-10-CM

## 2023-08-08 DIAGNOSIS — I10 ESSENTIAL HYPERTENSION: ICD-10-CM

## 2023-08-08 DIAGNOSIS — F10.90 ALCOHOL USE DISORDER: ICD-10-CM

## 2023-08-08 DIAGNOSIS — R74.8 ELEVATED LIVER ENZYMES: ICD-10-CM

## 2023-08-08 DIAGNOSIS — Z00.00 ROUTINE GENERAL MEDICAL EXAMINATION AT A HEALTH CARE FACILITY: Primary | ICD-10-CM

## 2023-08-08 LAB
ALBUMIN SERPL BCG-MCNC: 4.8 G/DL (ref 3.5–5.2)
ALP SERPL-CCNC: 126 U/L (ref 35–104)
ALT SERPL W P-5'-P-CCNC: 144 U/L (ref 0–50)
ANION GAP SERPL CALCULATED.3IONS-SCNC: 13 MMOL/L (ref 7–15)
AST SERPL W P-5'-P-CCNC: 319 U/L (ref 0–45)
BILIRUB SERPL-MCNC: 0.6 MG/DL
BUN SERPL-MCNC: 12.4 MG/DL (ref 6–20)
CALCIUM SERPL-MCNC: 10.2 MG/DL (ref 8.6–10)
CHLORIDE SERPL-SCNC: 103 MMOL/L (ref 98–107)
CHOLEST SERPL-MCNC: 243 MG/DL
CREAT SERPL-MCNC: 0.65 MG/DL (ref 0.51–0.95)
CREAT UR-MCNC: 307 MG/DL
DEPRECATED HCO3 PLAS-SCNC: 25 MMOL/L (ref 22–29)
GFR SERPL CREATININE-BSD FRML MDRD: >90 ML/MIN/1.73M2
GLUCOSE SERPL-MCNC: 139 MG/DL (ref 70–99)
HBA1C MFR BLD: 6.7 % (ref 0–5.6)
HDLC SERPL-MCNC: 72 MG/DL
LDLC SERPL CALC-MCNC: 140 MG/DL
MICROALBUMIN UR-MCNC: 109 MG/L
MICROALBUMIN/CREAT UR: 35.5 MG/G CR (ref 0–25)
NONHDLC SERPL-MCNC: 171 MG/DL
POTASSIUM SERPL-SCNC: 4 MMOL/L (ref 3.4–5.3)
PROT SERPL-MCNC: 8.2 G/DL (ref 6.4–8.3)
SODIUM SERPL-SCNC: 141 MMOL/L (ref 136–145)
TRIGL SERPL-MCNC: 157 MG/DL

## 2023-08-08 PROCEDURE — 99396 PREV VISIT EST AGE 40-64: CPT | Mod: 25 | Performed by: NURSE PRACTITIONER

## 2023-08-08 PROCEDURE — 82570 ASSAY OF URINE CREATININE: CPT | Performed by: NURSE PRACTITIONER

## 2023-08-08 PROCEDURE — 83036 HEMOGLOBIN GLYCOSYLATED A1C: CPT | Performed by: NURSE PRACTITIONER

## 2023-08-08 PROCEDURE — 90471 IMMUNIZATION ADMIN: CPT | Performed by: NURSE PRACTITIONER

## 2023-08-08 PROCEDURE — 80061 LIPID PANEL: CPT | Performed by: NURSE PRACTITIONER

## 2023-08-08 PROCEDURE — 99214 OFFICE O/P EST MOD 30 MIN: CPT | Mod: 25 | Performed by: NURSE PRACTITIONER

## 2023-08-08 PROCEDURE — 36415 COLL VENOUS BLD VENIPUNCTURE: CPT | Performed by: NURSE PRACTITIONER

## 2023-08-08 PROCEDURE — 82043 UR ALBUMIN QUANTITATIVE: CPT | Performed by: NURSE PRACTITIONER

## 2023-08-08 PROCEDURE — 80053 COMPREHEN METABOLIC PANEL: CPT | Performed by: NURSE PRACTITIONER

## 2023-08-08 PROCEDURE — 90746 HEPB VACCINE 3 DOSE ADULT IM: CPT | Performed by: NURSE PRACTITIONER

## 2023-08-08 RX ORDER — FLUTICASONE PROPIONATE 50 MCG
SPRAY, SUSPENSION (ML) NASAL
Qty: 48 G | Refills: 3 | Status: SHIPPED | OUTPATIENT
Start: 2023-08-08 | End: 2023-11-24

## 2023-08-08 RX ORDER — AMLODIPINE BESYLATE 5 MG/1
5 TABLET ORAL DAILY
Qty: 90 TABLET | Refills: 0 | Status: SHIPPED | OUTPATIENT
Start: 2023-08-08 | End: 2023-10-27

## 2023-08-08 ASSESSMENT — ENCOUNTER SYMPTOMS
EYE PAIN: 0
JOINT SWELLING: 0
FREQUENCY: 0
ARTHRALGIAS: 0
NAUSEA: 0
ABDOMINAL PAIN: 0
WEAKNESS: 0
PARESTHESIAS: 0
HEADACHES: 0
CONSTIPATION: 0
PALPITATIONS: 0
CHILLS: 0
SHORTNESS OF BREATH: 0
FEVER: 0
SORE THROAT: 0
COUGH: 0
HEMATURIA: 0
HEMATOCHEZIA: 0
NERVOUS/ANXIOUS: 1
DIARRHEA: 0
DYSURIA: 0
MYALGIAS: 0
HEARTBURN: 0
BREAST MASS: 0
DIZZINESS: 0

## 2023-08-08 ASSESSMENT — PATIENT HEALTH QUESTIONNAIRE - PHQ9
SUM OF ALL RESPONSES TO PHQ QUESTIONS 1-9: 1
10. IF YOU CHECKED OFF ANY PROBLEMS, HOW DIFFICULT HAVE THESE PROBLEMS MADE IT FOR YOU TO DO YOUR WORK, TAKE CARE OF THINGS AT HOME, OR GET ALONG WITH OTHER PEOPLE: NOT DIFFICULT AT ALL
SUM OF ALL RESPONSES TO PHQ QUESTIONS 1-9: 1

## 2023-08-08 NOTE — PROGRESS NOTES
SUBJECTIVE:   CC: Evita is an 57 year old who presents for preventive health visit.     Patient continues to follow with gastroenterology every 6 months due to elevated liver enzymes.  Her last liver enzyme check was improved.  She is also supposed to complete a FibroScan.    She continues on naltrexone for alcohol use.  She is currently abstaining and finds that that naltrexone is helpful.  She was off of it and using alcohol for a period of time.  Her counselor has a left and she is in search of a new counselor.  She has also attended a couple of AA meetings.    Blood pressures at home have been elevated.  She is currently on lisinopril 40 mg daily.    History of type 2 diabetes.  She has primarily been controlling this with diet and exercise.  Her last hemoglobin A1c was 6.6.    Healthy Habits:     Getting at least 3 servings of Calcium per day:  Yes    Bi-annual eye exam:  Yes    Dental care twice a year:  Yes    Sleep apnea or symptoms of sleep apnea:  None    Diet:  Other    Frequency of exercise:  2-3 days/week    Duration of exercise:  30-45 minutes    Taking medications regularly:  Yes    Medication side effects:  Not applicable    Additional concerns today:  Yes      Today's PHQ-9 Score:       8/8/2023     8:13 AM   PHQ-9 SCORE   PHQ-9 Total Score MyChart 1 (Minimal depression)   PHQ-9 Total Score 1       Have you ever done Advance Care Planning? (For example, a Health Directive, POLST, or a discussion with a medical provider or your loved ones about your wishes): No, advance care planning information given to patient to review.  Patient declined advance care planning discussion at this time.    Social History     Tobacco Use    Smoking status: Former     Types: Cigarettes    Smokeless tobacco: Never   Substance Use Topics    Alcohol use: Yes     Comment: Alcoholic Drinks/day: occasional, weekends, with meals. Wine           8/6/2023    12:28 PM   Alcohol Use   Prescreen: >3 drinks/day or >7  "drinks/week? No       Reviewed orders with patient.  Reviewed health maintenance and updated orders accordingly - Yes      Breast Cancer Screenin/6/2023    12:29 PM   Breast CA Risk Assessment (FHS-7)   Do you have a family history of breast, colon, or ovarian cancer? No / Unknown         Pertinent mammograms are reviewed under the imaging tab.    History of abnormal Pap smear: Status post benign hysterectomy. Health Maintenance and Surgical History updated.     Reviewed and updated as needed this visit by clinical staff   Tobacco  Allergies  Meds  Problems  Med Hx  Surg Hx  Fam Hx          Reviewed and updated as needed this visit by Provider   Tobacco  Allergies  Meds  Problems  Med Hx  Surg Hx  Fam Hx             Review of Systems   Constitutional:  Negative for chills and fever.   HENT:  Negative for congestion, ear pain, hearing loss and sore throat.    Eyes:  Negative for pain and visual disturbance.   Respiratory:  Negative for cough and shortness of breath.    Cardiovascular:  Negative for chest pain, palpitations and peripheral edema.   Gastrointestinal:  Negative for abdominal pain, constipation, diarrhea, heartburn, hematochezia and nausea.   Breasts:  Negative for tenderness, breast mass and discharge.   Genitourinary:  Negative for dysuria, frequency, genital sores, hematuria, pelvic pain, urgency, vaginal bleeding and vaginal discharge.   Musculoskeletal:  Negative for arthralgias, joint swelling and myalgias.   Skin:  Positive for rash.   Neurological:  Negative for dizziness, weakness, headaches and paresthesias.   Psychiatric/Behavioral:  Negative for mood changes. The patient is nervous/anxious.           OBJECTIVE:   BP (!) 156/78   Pulse 66   Temp 98  F (36.7  C) (Temporal)   Resp 16   Ht 1.689 m (5' 6.5\")   Wt 91.2 kg (201 lb)   SpO2 99%   BMI 31.96 kg/m    Physical Exam  GENERAL: healthy, alert and no distress  EYES: Eyes grossly normal to inspection, PERRL and " conjunctivae and sclerae normal  HENT: ear canals and TM's normal, nose and mouth without ulcers or lesions  NECK: no adenopathy, no asymmetry, masses, or scars and thyroid normal to palpation  RESP: lungs clear to auscultation - no rales, rhonchi or wheezes  CV: regular rate and rhythm, normal S1 S2, no S3 or S4, no murmur, click or rub, no peripheral edema  ABDOMEN: soft, nontender, no hepatosplenomegaly, no masses and bowel sounds normal  MS: no gross musculoskeletal defects noted, no edema  SKIN: no suspicious lesions or rashes  NEURO: Normal strength and tone, mentation intact and speech normal  PSYCH: mentation appears normal, affect normal/bright        ASSESSMENT/PLAN:   Evita was seen today for physical.    Diagnoses and all orders for this visit:    Routine general medical examination at a health care facility    Hypertension  Suboptimal control.  Continue lisinopril at current dose.  Add amlodipine 5 mg daily.  Patient scheduled for nurse follow-up in 1 month.  I recommend that she bring her home blood pressure monitor in with her at that time.  -     amLODIPine (NORVASC) 5 MG tablet; Take 1 tablet (5 mg) by mouth daily    Allergic rhinitis due to pollen, unspecified seasonality  -     fluticasone (FLONASE) 50 MCG/ACT nasal spray; SHAKE AND INSTILL 2 SPRAYS IN EACH NOSTRIL EVERY DAY AS NEEDED    Morbid obesity (H)    Type 2 diabetes mellitus with microalbuminuria, without long-term current use of insulin (H)  -     Comprehensive metabolic panel (BMP + Alb, Alk Phos, ALT, AST, Total. Bili, TP)  -     Hemoglobin A1c  -     Albumin Random Urine Quantitative with Creat Ratio    Mixed hyperlipidemia  Lipid panel pending.  Should likely be on a statin due to history of diabetes.  Will need to monitor liver function.   -     Lipid panel reflex to direct LDL Fasting    Alcohol use disorder  Currently abstaining while on Naltrexone.    Complex renal cyst  Encouraged patient to schedule follow up MRI.    Elevated  liver enzymes  Following with GI every 6 months.     Other orders  -     HEPATITIS B, ADULT (ENGERIX-B/RECOMBIVAX HB)  -     PRIMARY CARE FOLLOW-UP SCHEDULING; Future        Patient has been advised of split billing requirements and indicates understanding: Yes      COUNSELING:  Reviewed preventive health counseling, as reflected in patient instructions        She reports that she has quit smoking. Her smoking use included cigarettes and cigarettes. She has never used smokeless tobacco.      Mary Kate Duncan NP    [facility] HospitalAnswers submitted by the patient for this visit:  Patient Health Questionnaire (Submitted on 8/8/2023)  If you checked off any problems, how difficult have these problems made it for you to do your work, take care of things at home, or get along with other people?: Not difficult at all  PHQ9 TOTAL SCORE: 1

## 2023-08-24 ENCOUNTER — HOSPITAL ENCOUNTER (OUTPATIENT)
Dept: MRI IMAGING | Facility: CLINIC | Age: 58
Discharge: HOME | End: 2023-08-24
Attending: NURSE PRACTITIONER | Admitting: NURSE PRACTITIONER
Payer: COMMERCIAL

## 2023-08-24 DIAGNOSIS — N28.1 COMPLEX RENAL CYST: ICD-10-CM

## 2023-08-24 PROCEDURE — 255N000002 HC RX 255 OP 636: Mod: JZ | Performed by: NURSE PRACTITIONER

## 2023-08-24 PROCEDURE — 74183 MRI ABD W/O CNTR FLWD CNTR: CPT

## 2023-08-24 PROCEDURE — A9585 GADOBUTROL INJECTION: HCPCS | Mod: JZ | Performed by: NURSE PRACTITIONER

## 2023-08-24 RX ORDER — GADOBUTROL 604.72 MG/ML
9 INJECTION INTRAVENOUS ONCE
Status: COMPLETED | OUTPATIENT
Start: 2023-08-24 | End: 2023-08-24

## 2023-08-24 RX ADMIN — GADOBUTROL 9 ML: 604.72 INJECTION INTRAVENOUS at 07:45

## 2023-08-30 DIAGNOSIS — N28.1 COMPLEX RENAL CYST: Primary | ICD-10-CM

## 2023-09-11 ENCOUNTER — ALLIED HEALTH/NURSE VISIT (OUTPATIENT)
Dept: FAMILY MEDICINE | Facility: CLINIC | Age: 58
End: 2023-09-11
Payer: COMMERCIAL

## 2023-09-11 VITALS — OXYGEN SATURATION: 97 % | HEART RATE: 60 BPM | DIASTOLIC BLOOD PRESSURE: 72 MMHG | SYSTOLIC BLOOD PRESSURE: 130 MMHG

## 2023-09-11 DIAGNOSIS — I10 ESSENTIAL HYPERTENSION: Primary | ICD-10-CM

## 2023-09-11 PROCEDURE — 99207 PR NO CHARGE NURSE ONLY: CPT

## 2023-09-11 NOTE — PROGRESS NOTES
I met with Evita Vasquez at the request of Mary Kate Grewal CNP to recheck her blood pressure.  Blood pressure medications on the med list were reviewed with patient.    Patient has taken all medications as per usual regimen: Yes  Patient reports tolerating them without any issues or concerns: Yes    Vitals:    09/11/23 1520 09/11/23 1521   BP: (!) 140/70 130/72   BP Location: Left arm Left arm   Patient Position: Sitting Sitting   Cuff Size: Adult Regular Adult Regular   Pulse: 60    SpO2: 97%        Blood pressure was taken, previous encounter was reviewed, recorded blood pressure below 140/90.  Patient was discharged and the note will be sent to the provider for final review.     Patient brought her at home BP cuff and we compared, he cuff was displaying systolic and diastolic numbers about 20 points higher. At home cuff reading 161/92 pulse:56. I advised the patient to get a new cuff as her is about twenty ears old and to record her BP's for the next week or two at the same time every day and call us to report these results. I told her that if she becomes dizzy, light headed, or starts experiencing chest pain, headaches to go in and be seen.    VLAD Knowles

## 2023-09-25 ENCOUNTER — IMMUNIZATION (OUTPATIENT)
Dept: NURSING | Facility: CLINIC | Age: 58
End: 2023-09-25
Payer: COMMERCIAL

## 2023-09-25 PROCEDURE — 90471 IMMUNIZATION ADMIN: CPT

## 2023-09-25 PROCEDURE — 90682 RIV4 VACC RECOMBINANT DNA IM: CPT

## 2023-09-26 ENCOUNTER — TELEPHONE (OUTPATIENT)
Dept: FAMILY MEDICINE | Facility: CLINIC | Age: 58
End: 2023-09-26
Payer: COMMERCIAL

## 2023-09-26 NOTE — TELEPHONE ENCOUNTER
Reason for Call:  Appointment Request    Patient requesting this type of appt:  forms to be filled out     Requested provider: Mary Kate Duncan    Reason patient unable to be scheduled: Not within requested timeframe    When does patient want to be seen/preferred time: 1-2 days    Comments: Forms need to be filled out for an ANGIE     Could we send this information to you in [employer] or would you prefer to receive a phone call?:   Patient okay with message on [employer]  Okay to leave a detailed message?: yes on [employer]  Call taken on 9/26/2023 at 2:43 PM by Brianna Arriaga

## 2023-09-27 NOTE — TELEPHONE ENCOUNTER
left message on mobile phone, attempted  to call pt to schedule an appt for ANGIE forms. If pt calls back, please help schedule the pt with [facility].

## 2023-10-03 ENCOUNTER — OFFICE VISIT (OUTPATIENT)
Dept: FAMILY MEDICINE | Facility: CLINIC | Age: 58
End: 2023-10-03
Payer: COMMERCIAL

## 2023-10-03 VITALS
HEART RATE: 62 BPM | SYSTOLIC BLOOD PRESSURE: 132 MMHG | WEIGHT: 198.13 LBS | BODY MASS INDEX: 31.5 KG/M2 | DIASTOLIC BLOOD PRESSURE: 81 MMHG | TEMPERATURE: 98.6 F | OXYGEN SATURATION: 96 %

## 2023-10-03 DIAGNOSIS — F41.9 ANXIETY: Primary | ICD-10-CM

## 2023-10-03 PROBLEM — N28.1 RENAL CYST: Status: ACTIVE | Noted: 2023-10-03

## 2023-10-03 PROCEDURE — 99213 OFFICE O/P EST LOW 20 MIN: CPT | Performed by: NURSE PRACTITIONER

## 2023-10-03 NOTE — PROGRESS NOTES
"  Assessment & Plan     Anxiety  Increased anxiety secondary to major work stressors.  This is also challenging her sobriety.  Agree with time off work and letter written to support this.  Referral placed to establish with a new counselor.  Declines any medications at this time, as anxiety is very situational.  - Adult Mental Health  Referral         BMI:   Estimated body mass index is 31.5 kg/m  as calculated from the following:    Height as of 8/8/23: 1.689 m (5' 6.5\").    Weight as of this encounter: 89.9 kg (198 lb 2 oz).       Mary Kate Duncan NP  [facility]    Lori Jama is a 58 year old who presents with mental health concerns.  Patient works as a  at on [employer].  She serves adolescents aged 16-21.  She has recently been having more issues with her administration and there have been a lot of stressors regarding this.  She also has some safety concerns at school regarding the children.  Patient has only been back to school for about 5 to 6 weeks and is not doing well.  She is sleeping very poorly at night and having a lot of anxiety.  She is not interested in any medication for this.  She did have an established counselor, but her counselor left and she has not found anyone new.  This anxiety has been challenging her sobriety and she did have 1 night where she had 3 glasses of wine to help her anxiety.  Patient is requesting a 2-week leave of absence from work.      History of Present Illness       Reason for visit:  Paperwork for medical leave feom work    She eats 2-3 servings of fruits and vegetables daily.She consumes 0 sweetened beverage(s) daily.She exercises with enough effort to increase her heart rate 30 to 60 minutes per day.  She exercises with enough effort to increase her heart rate 3 or less days per week.   She is taking medications regularly.       Review of Systems   Pertinent items in HPI      "   Objective    /81 (BP Location: Right arm, Patient Position: Sitting, Cuff Size: Adult Regular)   Pulse 62   Temp 98.6  F (37  C)   Wt 89.9 kg (198 lb 2 oz)   SpO2 96%   BMI 31.50 kg/m    Body mass index is 31.5 kg/m .  Physical Exam   GENERAL: alert and no distress  PSYCH: mentation appears normal, affect teary

## 2023-10-03 NOTE — LETTER
October 3, 2023      Evita Vasquez  [address] [address]        To Whom It May Concern:    Evita Vasquez is under my care for primary care.  Please excuse from work October 10, 2023 through October 24, 2023 due to mental health concerns.  Patient may return October 25, 2023 without restriction.        Sincerely,        Mary Kate Duncan NP

## 2023-10-27 DIAGNOSIS — I10 ESSENTIAL HYPERTENSION: ICD-10-CM

## 2023-10-27 RX ORDER — AMLODIPINE BESYLATE 5 MG/1
5 TABLET ORAL DAILY
Qty: 90 TABLET | Refills: 2 | Status: SHIPPED | OUTPATIENT
Start: 2023-10-27 | End: 2024-07-06

## 2023-11-08 ENCOUNTER — OFFICE VISIT (OUTPATIENT)
Dept: FAMILY MEDICINE | Facility: CLINIC | Age: 58
End: 2023-11-08
Payer: COMMERCIAL

## 2023-11-08 VITALS
WEIGHT: 200 LBS | HEART RATE: 79 BPM | SYSTOLIC BLOOD PRESSURE: 132 MMHG | OXYGEN SATURATION: 97 % | DIASTOLIC BLOOD PRESSURE: 74 MMHG | BODY MASS INDEX: 31.39 KG/M2 | TEMPERATURE: 97.7 F | HEIGHT: 67 IN | RESPIRATION RATE: 16 BRPM

## 2023-11-08 DIAGNOSIS — R80.9 TYPE 2 DIABETES MELLITUS WITH MICROALBUMINURIA, WITHOUT LONG-TERM CURRENT USE OF INSULIN (H): Primary | ICD-10-CM

## 2023-11-08 DIAGNOSIS — E11.29 TYPE 2 DIABETES MELLITUS WITH MICROALBUMINURIA, WITHOUT LONG-TERM CURRENT USE OF INSULIN (H): Primary | ICD-10-CM

## 2023-11-08 DIAGNOSIS — I10 ESSENTIAL HYPERTENSION: ICD-10-CM

## 2023-11-08 DIAGNOSIS — T75.3XXS MOTION SICKNESS, SEQUELA: ICD-10-CM

## 2023-11-08 DIAGNOSIS — F10.90 ALCOHOL USE DISORDER: ICD-10-CM

## 2023-11-08 PROCEDURE — 90480 ADMN SARSCOV2 VAC 1/ONLY CMP: CPT | Performed by: NURSE PRACTITIONER

## 2023-11-08 PROCEDURE — 99214 OFFICE O/P EST MOD 30 MIN: CPT | Mod: 25 | Performed by: NURSE PRACTITIONER

## 2023-11-08 PROCEDURE — 90746 HEPB VACCINE 3 DOSE ADULT IM: CPT | Performed by: NURSE PRACTITIONER

## 2023-11-08 PROCEDURE — 90471 IMMUNIZATION ADMIN: CPT | Performed by: NURSE PRACTITIONER

## 2023-11-08 PROCEDURE — 91320 SARSCV2 VAC 30MCG TRS-SUC IM: CPT | Performed by: NURSE PRACTITIONER

## 2023-11-08 RX ORDER — SCOLOPAMINE TRANSDERMAL SYSTEM 1 MG/1
1 PATCH, EXTENDED RELEASE TRANSDERMAL
Qty: 4 PATCH | Refills: 0 | Status: SHIPPED | OUTPATIENT
Start: 2023-11-08

## 2023-11-08 NOTE — PROGRESS NOTES
"  Assessment & Plan     Type 2 diabetes mellitus with microalbuminuria, without long-term current use of insulin (H)  Deferred recheck of Hgb A1c today.  Will check in 3 months.  Not currently on any medication.  Should be on a statin, but we have not initiated this due to her elevated LFTs.     Hypertension  Controlled.    Alcohol use disorder  Continues to struggle with this.  Has follow up with [facility] next month to recheck LFTs.    Motion sickness, sequela  - scopolamine (TRANSDERM) 1 MG/3DAYS 72 hr patch; Place 1 patch onto the skin every 72 hours  Dispense: 4 patch; Refill: 0       BMI:   Estimated body mass index is 31.8 kg/m  as calculated from the following:    Height as of this encounter: 1.689 m (5' 6.5\").    Weight as of this encounter: 90.7 kg (200 lb).       Mary Kate Duncan NP  [facility]    Lori Jama is a 58 year old who presents for follow-up.  Patient was last seen last month and at that time was taking a leave of absence from work.  She enjoyed her leave of absence, but unfortunately the stressors of work remained once she returned.  The last month has been a little bit stressful.  She has not been taking her naltrexone and has been using more alcohol.  Patient was able to schedule with a new counselor and has an appointment in February.  She also went to an AA meeting.  Patient will follow-up with [facility] next month to recheck her LFTs, as these have been chronically elevated.  She is also scheduled for a colonoscopy next year.    Patient has been updating her resume and is looking for alternative employment.  This makes her feel hopeful.    Blood pressures at home have been in the 130s over 70s.    History of type 2 diabetes.  Last hemoglobin A1c was stable at 6.7.  We have deferred medication at this time.  Was previously on metformin, but stopped due to her elevated LFTs.    Requesting a prescription for Scopolamine for upcoming travel.  She has used these in " "the past for motion sickness.      History of Present Illness       Diabetes:   She presents for follow up of diabetes.    She is not checking blood glucose.         She has no concerns regarding her diabetes at this time.   She is not experiencing numbness or burning in feet, excessive thirst, blurry vision, weight changes or redness, sores or blisters on feet.           Hyperlipidemia:  She presents for follow up of hyperlipidemia.   She is not taking medication to lower cholesterol. She is not having myalgia or other side effects to statin medications.    Hypertension: She presents for follow up of hypertension.  She does check blood pressure  regularly outside of the clinic. Outpatient blood pressures have not been over 140/90. She does not follow a low salt diet.     She eats 0-1 servings of fruits and vegetables daily.She consumes 0 sweetened beverage(s) daily.She exercises with enough effort to increase her heart rate 9 or less minutes per day.  She exercises with enough effort to increase her heart rate 3 or less days per week.   She is taking medications regularly.         Review of Systems   Pertinent items in HPI        Objective    /74   Pulse 79   Temp 97.7  F (36.5  C) (Temporal)   Resp 16   Ht 1.689 m (5' 6.5\")   Wt 90.7 kg (200 lb)   SpO2 97%   BMI 31.80 kg/m    Body mass index is 31.8 kg/m .  Physical Exam   GENERAL: healthy, alert and no distress  PSYCH: mentation appears normal, affect normal/bright                  "

## 2023-11-14 ENCOUNTER — PATIENT OUTREACH (OUTPATIENT)
Dept: GASTROENTEROLOGY | Facility: CLINIC | Age: 58
End: 2023-11-14
Payer: COMMERCIAL

## 2023-11-14 DIAGNOSIS — Z12.11 SPECIAL SCREENING FOR MALIGNANT NEOPLASMS, COLON: Primary | ICD-10-CM

## 2023-11-14 NOTE — PROGRESS NOTES
"CRC Screening Colonoscopy Referral Review    Patient meets the inclusion criteria for screening colonoscopy standing order.    Ordering/Referring Provider:  Mary Kate Duncan      BMI: Estimated body mass index is 31.8 kg/m  as calculated from the following:    Height as of 11/8/23: 1.689 m (5' 6.5\").    Weight as of 11/8/23: 90.7 kg (200 lb).     Sedation:  Does patient have any of the following conditions affecting sedation?  Hx of chemical dependency: MAC sedation recommended    Previous Scopes:  Any previous recommendations or follow up needs based on previous scope?  na / No recommendations.    Medical Concerns to Postpone Order:  Does patient have any of the following medical concerns that should postpone/delay colonoscopy referral?  No medical conditions affecting colonoscopy referral.    Final Referral Details:  Based on patient's medical history patient is appropriate for referral order with MAC/deep sedation.   BMI<= 45 45 < BMI <= 48 48 < BMI < = 50  BMI > 50   No Restrictions No [facility]  No [facility]  [facility] with exceptions Hospital Only OR Only     "

## 2023-11-24 DIAGNOSIS — J30.1 ALLERGIC RHINITIS DUE TO POLLEN, UNSPECIFIED SEASONALITY: ICD-10-CM

## 2023-11-24 RX ORDER — FLUTICASONE PROPIONATE 50 MCG
SPRAY, SUSPENSION (ML) NASAL
Qty: 48 G | Refills: 2 | Status: SHIPPED | OUTPATIENT
Start: 2023-11-24

## 2024-01-05 ENCOUNTER — HOSPITAL ENCOUNTER (OUTPATIENT)
Dept: MAMMOGRAPHY | Facility: CLINIC | Age: 59
Discharge: HOME | End: 2024-01-05
Attending: NURSE PRACTITIONER | Admitting: NURSE PRACTITIONER
Payer: COMMERCIAL

## 2024-01-05 DIAGNOSIS — Z12.31 VISIT FOR SCREENING MAMMOGRAM: ICD-10-CM

## 2024-01-05 PROCEDURE — 77063 BREAST TOMOSYNTHESIS BI: CPT

## 2024-01-23 ENCOUNTER — TRANSFERRED RECORDS (OUTPATIENT)
Dept: HEALTH INFORMATION MANAGEMENT | Facility: CLINIC | Age: 59
End: 2024-01-23
Payer: COMMERCIAL

## 2024-01-23 LAB — RETINOPATHY: NEGATIVE

## 2024-01-25 NOTE — PATIENT INSTRUCTIONS
Please call [phone] to schedule the MRI of your kidney    Please bring your BP monitor in with you next month when you follow up with the nurse.        Preventive Health Recommendations  Female Ages 50 - 64    Yearly exam: See your health care provider every year in order to  Review health changes.   Discuss preventive care.    Review your medicines if your doctor has prescribed any.    Get a Pap test every three years (unless you have an abnormal result and your provider advises testing more often).  If you get Pap tests with HPV test, you only need to test every 5 years, unless you have an abnormal result.   You do not need a Pap test if your uterus was removed (hysterectomy) and you have not had cancer.  You should be tested each year for STDs (sexually transmitted diseases) if you're at risk.   Have a mammogram every 1 to 2 years.  Have a colonoscopy at age 50, or have a yearly FIT test (stool test). These exams screen for colon cancer.    Have a cholesterol test every 5 years, or more often if advised.  Have a diabetes test (fasting glucose) every three years. If you are at risk for diabetes, you should have this test more often.   If you are at risk for osteoporosis (brittle bone disease), think about having a bone density scan (DEXA).    Shots: Get a flu shot each year. Get a tetanus shot every 10 years.    Nutrition:   Eat at least 5 servings of fruits and vegetables each day.  Eat whole-grain bread, whole-wheat pasta and brown rice instead of white grains and rice.  Get adequate Calcium and Vitamin D.     Lifestyle  Exercise at least 150 minutes a week (30 minutes a day, 5 days a week). This will help you control your weight and prevent disease.  Limit alcohol to one drink per day.  No smoking.   Wear sunscreen to prevent skin cancer.   See your dentist every six months for an exam and cleaning.  See your eye doctor every 1 to 2 years.    
1 day

## 2024-02-02 ENCOUNTER — TRANSFERRED RECORDS (OUTPATIENT)
Dept: HEALTH INFORMATION MANAGEMENT | Facility: CLINIC | Age: 59
End: 2024-02-02
Payer: COMMERCIAL

## 2024-02-23 ENCOUNTER — ANCILLARY PROCEDURE (OUTPATIENT)
Dept: GENERAL RADIOLOGY | Facility: CLINIC | Age: 59
End: 2024-02-23
Attending: NURSE PRACTITIONER
Payer: COMMERCIAL

## 2024-02-23 ENCOUNTER — OFFICE VISIT (OUTPATIENT)
Dept: FAMILY MEDICINE | Facility: CLINIC | Age: 59
End: 2024-02-23
Attending: NURSE PRACTITIONER
Payer: COMMERCIAL

## 2024-02-23 ENCOUNTER — HOSPITAL ENCOUNTER (OUTPATIENT)
Dept: MRI IMAGING | Facility: CLINIC | Age: 59
Discharge: HOME | End: 2024-02-23
Attending: NURSE PRACTITIONER | Admitting: NURSE PRACTITIONER
Payer: COMMERCIAL

## 2024-02-23 VITALS
BODY MASS INDEX: 31.86 KG/M2 | TEMPERATURE: 97.9 F | SYSTOLIC BLOOD PRESSURE: 130 MMHG | OXYGEN SATURATION: 97 % | DIASTOLIC BLOOD PRESSURE: 72 MMHG | HEART RATE: 57 BPM | WEIGHT: 203 LBS | HEIGHT: 67 IN | RESPIRATION RATE: 16 BRPM

## 2024-02-23 DIAGNOSIS — M25.512 ACUTE PAIN OF LEFT SHOULDER: ICD-10-CM

## 2024-02-23 DIAGNOSIS — R80.9 TYPE 2 DIABETES MELLITUS WITH MICROALBUMINURIA, WITHOUT LONG-TERM CURRENT USE OF INSULIN (H): Primary | ICD-10-CM

## 2024-02-23 DIAGNOSIS — N28.1 COMPLEX RENAL CYST: ICD-10-CM

## 2024-02-23 DIAGNOSIS — E11.29 TYPE 2 DIABETES MELLITUS WITH MICROALBUMINURIA, WITHOUT LONG-TERM CURRENT USE OF INSULIN (H): Primary | ICD-10-CM

## 2024-02-23 DIAGNOSIS — E66.01 MORBID OBESITY DUE TO EXCESS CALORIES (H): ICD-10-CM

## 2024-02-23 DIAGNOSIS — E83.52 HYPERCALCEMIA: ICD-10-CM

## 2024-02-23 LAB
HBA1C MFR BLD: 6.9 % (ref 0–5.6)
PTH-INTACT SERPL-MCNC: 34 PG/ML (ref 15–65)

## 2024-02-23 PROCEDURE — 99207 PR FOOT EXAM NO CHARGE: CPT | Performed by: NURSE PRACTITIONER

## 2024-02-23 PROCEDURE — 999N000248 HC STATISTIC IV INSERT WITH US BY RN

## 2024-02-23 PROCEDURE — 74183 MRI ABD W/O CNTR FLWD CNTR: CPT

## 2024-02-23 PROCEDURE — 83970 ASSAY OF PARATHORMONE: CPT | Performed by: NURSE PRACTITIONER

## 2024-02-23 PROCEDURE — A9585 GADOBUTROL INJECTION: HCPCS | Performed by: NURSE PRACTITIONER

## 2024-02-23 PROCEDURE — 255N000002 HC RX 255 OP 636: Performed by: NURSE PRACTITIONER

## 2024-02-23 PROCEDURE — 83036 HEMOGLOBIN GLYCOSYLATED A1C: CPT | Performed by: NURSE PRACTITIONER

## 2024-02-23 PROCEDURE — 36415 COLL VENOUS BLD VENIPUNCTURE: CPT | Performed by: NURSE PRACTITIONER

## 2024-02-23 PROCEDURE — 99214 OFFICE O/P EST MOD 30 MIN: CPT | Performed by: NURSE PRACTITIONER

## 2024-02-23 PROCEDURE — 73030 X-RAY EXAM OF SHOULDER: CPT | Mod: TC | Performed by: RADIOLOGY

## 2024-02-23 RX ORDER — GADOBUTROL 604.72 MG/ML
9 INJECTION INTRAVENOUS ONCE
Status: COMPLETED | OUTPATIENT
Start: 2024-02-23 | End: 2024-02-23

## 2024-02-23 RX ORDER — PSEUDOEPHEDRINE HCL 30 MG
TABLET ORAL EVERY 4 HOURS PRN
COMMUNITY

## 2024-02-23 RX ADMIN — GADOBUTROL 6.9 ML: 604.72 INJECTION INTRAVENOUS at 10:42

## 2024-02-23 NOTE — PROGRESS NOTES
"  Assessment & Plan     Type 2 diabetes mellitus with microalbuminuria, without long-term current use of insulin (H)  Hemoglobin A1c elevated at 6.9.  This is up from previous values.  I have suggested an MTM referral to discuss medication, especially and lieu of her elevated LFTs.  - FOOT EXAM  - Hemoglobin A1c    Hypercalcemia  - Parathyroid Hormone Intact    Acute pain of left shoulder  Suspect impingement.  X-ray of the left shoulder is pending.  Recommend physical therapy evaluation and treatment.  If not improving, can consider MRI and/or orthopedic referral.   - Physical Therapy  Referral  - XR Shoulder Left 2 Views    Morbid obesity due to excess calories (H)      BMI  Estimated body mass index is 32.27 kg/m  as calculated from the following:    Height as of this encounter: 1.689 m (5' 6.5\").    Weight as of this encounter: 92.1 kg (203 lb).   Weight management plan: Discussed healthy diet and exercise guidelines        Lori Jama is a 58 year old who presents for diabetic check.  Patient has not been on medication for diabetes in the past.  Last hemoglobin A1c C was 6.7.  Patient is not checking blood sugars at all at home.    Patient did noticed an elevated calcium with her last labs.  Reports her sister has a history of parathyroidism and had a parathyroidectomy.    Patient complains of left shoulder pain for about 4 months now.  No acute injury or fall.  It hurts a lot to lay on that side at night.  Also has pain with overhead and posterior movements.  She has been utilizing lidocaine and IcyHot, but pain seems to be getting worse.    She follows with gastroenterology due to elevated LFTs.    Follow Up (6 month f/up. ; discuss previous calcium labs done ) and Shoulder Pain (Left shoulder pain ; ongoing for awhile ; been using icy hot and lidocaine patches ; feels like pain moved up to shoulder blade this morning ; range of motion has changed )        2/23/2024     8:37 AM   Additional " "Questions   Roomed by Ric X     History of Present Illness       Diabetes:   She presents for follow up of diabetes.    She is not checking blood glucose.        She is concerned about other.    She is not experiencing numbness or burning in feet, excessive thirst, blurry vision, weight changes or redness, sores or blisters on feet.           Hypertension: She presents for follow up of hypertension.  She does check blood pressure  regularly outside of the clinic. Outpatient blood pressures have not been over 140/90. She does not follow a low salt diet.     Reason for visit:  Check up    She eats 4 or more servings of fruits and vegetables daily.She consumes 0 sweetened beverage(s) daily.She exercises with enough effort to increase her heart rate 9 or less minutes per day.  She exercises with enough effort to increase her heart rate 3 or less days per week.   She is taking medications regularly.           Pertinent items in HPI        Objective    /72 (BP Location: Right arm, Patient Position: Sitting, Cuff Size: Adult Regular)   Pulse 57   Temp 97.9  F (36.6  C) (Temporal)   Resp 16   Ht 1.689 m (5' 6.5\")   Wt 92.1 kg (203 lb)   SpO2 97%   BMI 32.27 kg/m    Body mass index is 32.27 kg/m .  Physical Exam   GENERAL: alert and no distress  RESP: lungs clear to auscultation - no rales, rhonchi or wheezes  CV: regular rate and rhythm, normal S1 S2, no S3 or S4, no murmur, click or rub, no peripheral edema   MS: Left shoulder joint appears normal without acute deformity, swelling, or asymmetry.  No pain palpated.  Range of motion is limited with abduction and posterior movements secondary to pain.  Strength is slightly decreased with abduction compared to right.          Signed Electronically by: Mary Kate Duncan NP    "

## 2024-03-12 ENCOUNTER — TRANSFERRED RECORDS (OUTPATIENT)
Dept: HEALTH INFORMATION MANAGEMENT | Facility: CLINIC | Age: 59
End: 2024-03-12
Payer: COMMERCIAL

## 2024-03-19 ASSESSMENT — ACTIVITIES OF DAILY LIVING (ADL)
PUTTING_ON_A_SHIRT_THAT_BUTTONS_DOWN_THE_FRONT: 0
REACHING_FOR_SOMETHING_ON_A_HIGH_SHELF: 8
PLACING_AN_OBJECT_ON_A_HIGH_SHELF: 8
WASHING_YOUR_BACK: 3
WHEN_LYING_ON_THE_INVOLVED_SIDE: 9
AT_ITS_WORST?: 7
REMOVING_SOMETHING_FROM_YOUR_BACK_POCKET: 1
PUTTING_ON_AN_UNDERSHIRT_OR_A_PULLOVER_SWEATER: 6
PUTTING_ON_YOUR_PANTS: 0
PUSHING_WITH_THE_INVOLVED_ARM: 0
PLEASE_INDICATE_YOR_PRIMARY_REASON_FOR_REFERRAL_TO_THERAPY:: SHOULDER
CARRYING_A_HEAVY_OBJECT_OF_10_POUNDS: 0
WASHING_YOUR_HAIR?: 2
TOUCHING_THE_BACK_OF_YOUR_NECK: 1

## 2024-03-25 ENCOUNTER — THERAPY VISIT (OUTPATIENT)
Dept: PHYSICAL THERAPY | Facility: REHABILITATION | Age: 59
End: 2024-03-25
Attending: NURSE PRACTITIONER
Payer: COMMERCIAL

## 2024-03-25 DIAGNOSIS — M25.512 ACUTE PAIN OF LEFT SHOULDER: ICD-10-CM

## 2024-03-25 PROCEDURE — 97110 THERAPEUTIC EXERCISES: CPT | Mod: GP | Performed by: PHYSICAL THERAPIST

## 2024-03-25 PROCEDURE — 97161 PT EVAL LOW COMPLEX 20 MIN: CPT | Mod: GP | Performed by: PHYSICAL THERAPIST

## 2024-03-25 NOTE — PROGRESS NOTES
"PHYSICAL THERAPY EVALUATION  Type of Visit: Evaluation    See electronic medical record for Abuse and Falls Screening details.    Subjective  Patient arrives w/ reports of L shoulder pain that \"goes down her arm.\"  She reports noticing it in Oct. 2023; she woke up in her hotel while on vacation and noticed her arm hurting.  She states she thought at the time she had slept on it funny, and the pain dissapated over time.  She then however noticed pain returning since December, and reports it has progressively worsened over time since.  R-hand dominant.  Denies numbness or tingling.  States pain goes to mid arm.  Denies neck pain.      Pain chaatactaristrics:  sharp  Aggrevating: movement (ER/IR), sleep, heavy underhand lifting  Functional limitations: dressing, sleeping, overhead reaching, lifting light to heavy  Relieving factors: lidoncane patch, icy hot, heating pad  Worst pain: 7/10        Presenting condition or subjective complaint: I discussed with my doctor pain I am having in my shoulder running down my arm.  Depending on how I move it I will yell out in pain.  Seatbelt, sleep, and just moving it the wrong way.  Date of onset: 10/25/23    Relevant medical history: Arthritis; Diabetes; Heart problems; High blood pressure; Overweight; Pain at night or rest   Dates & types of surgery: Hysterectomy    Prior diagnostic imaging/testing results: X-ray     Prior therapy history for the same diagnosis, illness or injury: No      Prior Level of Function  Transfers: Independent  Ambulation: Independent  ADL: Independent  IADL: Housekeeping, Meal preparation, Work    Living Environment  Social support: With a significant other or spouse   Type of home: [facility]   Stairs to enter the home: Yes 20 Is there a railing: Yes   Ramp: No   Stairs inside the home: Yes 20 Is there a railing: Yes   Help at home: None  Equipment owned:       Employment: Yes teacher  Hobbies/Interests: walking, swimming, biking, reading, playing with " grandchildren    Patient goals for therapy: Take off my bra, put on seatbelt without having to reach over, sleeping on my favorite side, not pain when I move it the wrong way.       Objective   SHOULDER EVALUATION  Pain assessment: Location: L shoulder anterolateral/Ratin/10 at worst  INTEGUMENTARY (edema, incisions): WNL  POSTURE: Sitting Posture: Rounded shoulders, Forward head    ROM:   (Degrees) Left AROM Left PROM Right AROM  Right PROM   Shoulder Flexion 110, painful  165     Shoulder Extension       Shoulder Abduction Limited to 90 by pain  180    Shoulder Adduction       Shoulder Extension Unable to reach behind; reaches to lateral hip; painful  T9    Shoulder Flexion C7; painful  T4    Elbow Extension WNL  WNL    Elbow Flexion WNL  WNL      STRENGTH:   Pain: - none + mild ++ moderate +++ severe  Strength Scale: 0-5/5 Left Right   Shoulder Flexion 4- 4+   Shoulder Abduction 3, limited due to pain 4+   Shoulder Internal Rotation 4+ 4+   Shoulder External Rotation 4 5   Elbow Flexion 4-, mild pain 5-   Elbow Extension 4-, mild pain 5-     Wrist flexion/ extension: 5/5 valente    FLEXIBILITY: WNL  SPECIAL TESTS:    Left Right   Impingement     Neer's Positive    Labral     [place]'s Positive    Yokum's: positive L  Empty Can: negative L  Cross-body reach: positive L    PALPATION: TTP L lateral deltoid, no TTP of AC joint, greater tubercle, bicipital groove, supraspinatus, infraspinatus, teres minor  JOINT MOBILITY: WNL  CERVICAL SCREEN:   (Degrees) Left AROM Right AROM   Cervical Flexion WNL AROM    Cervical Extension WNL AROM    Side bend WNL WNL   Rotation WNL WNL   Patient reports no pain with all cervical AROM movements    Assessment & Plan   CLINICAL IMPRESSIONS  Medical Diagnosis: M25.512 (ICD-10-CM) - Acute pain of left shoulder    Treatment Diagnosis: Shoulder pain with mobility and force production deficits   Impression/Assessment: Patient is a 58 year old female with L shoulder pain complaints.  The  following significant findings have been identified: Pain, Decreased ROM/flexibility, Decreased strength, Impaired muscle performance, Decreased activity tolerance, and Impaired posture. Her presentation seems consistent with chronic L shoulder pain with suspected rotator cuff pathology. These impairments interfere with their ability to perform self care tasks, work tasks, recreational activities, household chores, and household mobility and ability to sleep as compared to previous level of function.     Clinical Decision Making (Complexity):  Clinical Presentation: Stable/Uncomplicated  Clinical Presentation Rationale: based on medical and personal factors listed in PT evaluation  Clinical Decision Making (Complexity): Low complexity    PLAN OF CARE  Treatment Interventions:  Modalities: E-stim, Mechanical Traction  Interventions: Manual Therapy, Neuromuscular Re-education, Therapeutic Activity, Therapeutic Exercise, Self-Care/Home Management, Standardized Testing    Long Term Goals     PT Goal 1  Goal Identifier: HEP  Goal Description: Patient will demonstrate mastery of HEP as evidenced by reporting adherence 5/7 days per week in order to return to PLOF  Target Date: 04/24/24  PT Goal 2  Goal Identifier: Sleep  Goal Description: Patient will report no more than one sleep disturbance due to shoulder pain in order to gain restful sleep.  Target Date: 05/24/24  PT Goal 3  Goal Identifier: ROM  Goal Description: Patient will report ability to get dressed and shampoo with shoulder pain of no more than 1/10 in order to perform ADL's pain-free.  Target Date: 05/24/24  PT Goal 4  Goal Identifier: Strength  Goal Description: Patient will demonstrate L shoulder flexion/abduction MMT of 4+ with no more than 1/10 pain in order to perform light duties about the home  Target Date: 05/24/24      Frequency of Treatment: 1x/week  Duration of Treatment: 60 days    Recommended Referrals to Other Professionals:   Education  Assessment:   Learner/Method: Patient    Risks and benefits of evaluation/treatment have been explained.   Patient/Family/caregiver agrees with Plan of Care.     Evaluation Time:          Signing Clinician: Tamie Linton PT    Therapy services provided with the co-signing licensed therapist guiding and directing the services, and providing the skilled judgement and assessment throughout the session;Direct Patient Contact Provided;

## 2024-04-01 ENCOUNTER — THERAPY VISIT (OUTPATIENT)
Dept: PHYSICAL THERAPY | Facility: REHABILITATION | Age: 59
End: 2024-04-01
Attending: NURSE PRACTITIONER
Payer: COMMERCIAL

## 2024-04-01 DIAGNOSIS — M25.512 ACUTE PAIN OF LEFT SHOULDER: Primary | ICD-10-CM

## 2024-04-01 PROCEDURE — 97140 MANUAL THERAPY 1/> REGIONS: CPT | Mod: GP | Performed by: PHYSICAL THERAPIST

## 2024-04-01 PROCEDURE — 97110 THERAPEUTIC EXERCISES: CPT | Mod: GP | Performed by: PHYSICAL THERAPIST

## 2024-04-08 ENCOUNTER — THERAPY VISIT (OUTPATIENT)
Dept: PHYSICAL THERAPY | Facility: REHABILITATION | Age: 59
End: 2024-04-08
Payer: COMMERCIAL

## 2024-04-08 DIAGNOSIS — M25.512 ACUTE PAIN OF LEFT SHOULDER: Primary | ICD-10-CM

## 2024-04-08 PROCEDURE — 97110 THERAPEUTIC EXERCISES: CPT | Mod: GP | Performed by: PHYSICAL THERAPIST

## 2024-04-08 PROCEDURE — 97140 MANUAL THERAPY 1/> REGIONS: CPT | Mod: GP | Performed by: PHYSICAL THERAPIST

## 2024-04-15 ENCOUNTER — THERAPY VISIT (OUTPATIENT)
Dept: PHYSICAL THERAPY | Facility: REHABILITATION | Age: 59
End: 2024-04-15
Payer: COMMERCIAL

## 2024-04-15 DIAGNOSIS — M25.512 ACUTE PAIN OF LEFT SHOULDER: Primary | ICD-10-CM

## 2024-04-15 PROCEDURE — 97110 THERAPEUTIC EXERCISES: CPT | Mod: GP | Performed by: PHYSICAL THERAPIST

## 2024-04-15 PROCEDURE — 97140 MANUAL THERAPY 1/> REGIONS: CPT | Mod: GP | Performed by: PHYSICAL THERAPIST

## 2024-04-20 DIAGNOSIS — I10 ESSENTIAL HYPERTENSION WITH GOAL BLOOD PRESSURE LESS THAN 130/80: ICD-10-CM

## 2024-04-22 RX ORDER — LISINOPRIL 40 MG/1
40 TABLET ORAL DAILY
Qty: 90 TABLET | Refills: 0 | Status: SHIPPED | OUTPATIENT
Start: 2024-04-22 | End: 2024-07-19

## 2024-04-30 ENCOUNTER — THERAPY VISIT (OUTPATIENT)
Dept: PHYSICAL THERAPY | Facility: REHABILITATION | Age: 59
End: 2024-04-30
Payer: COMMERCIAL

## 2024-04-30 DIAGNOSIS — M25.512 ACUTE PAIN OF LEFT SHOULDER: Primary | ICD-10-CM

## 2024-04-30 PROCEDURE — 97110 THERAPEUTIC EXERCISES: CPT | Mod: GP | Performed by: PHYSICAL THERAPIST

## 2024-04-30 PROCEDURE — 97140 MANUAL THERAPY 1/> REGIONS: CPT | Mod: GP | Performed by: PHYSICAL THERAPIST

## 2024-05-21 ENCOUNTER — THERAPY VISIT (OUTPATIENT)
Dept: PHYSICAL THERAPY | Facility: REHABILITATION | Age: 59
End: 2024-05-21
Payer: COMMERCIAL

## 2024-05-21 DIAGNOSIS — M25.512 ACUTE PAIN OF LEFT SHOULDER: Primary | ICD-10-CM

## 2024-05-21 PROCEDURE — 97110 THERAPEUTIC EXERCISES: CPT | Mod: GP | Performed by: PHYSICAL THERAPIST

## 2024-05-21 PROCEDURE — 97140 MANUAL THERAPY 1/> REGIONS: CPT | Mod: GP | Performed by: PHYSICAL THERAPIST

## 2024-05-29 ENCOUNTER — THERAPY VISIT (OUTPATIENT)
Dept: PHYSICAL THERAPY | Facility: REHABILITATION | Age: 59
End: 2024-05-29
Payer: COMMERCIAL

## 2024-05-29 DIAGNOSIS — M25.512 ACUTE PAIN OF LEFT SHOULDER: Primary | ICD-10-CM

## 2024-05-29 PROCEDURE — 97140 MANUAL THERAPY 1/> REGIONS: CPT | Mod: GP | Performed by: PHYSICAL THERAPIST

## 2024-05-29 PROCEDURE — 97110 THERAPEUTIC EXERCISES: CPT | Mod: GP | Performed by: PHYSICAL THERAPIST

## 2024-06-12 ENCOUNTER — THERAPY VISIT (OUTPATIENT)
Dept: PHYSICAL THERAPY | Facility: REHABILITATION | Age: 59
End: 2024-06-12
Payer: COMMERCIAL

## 2024-06-12 DIAGNOSIS — M25.512 ACUTE PAIN OF LEFT SHOULDER: Primary | ICD-10-CM

## 2024-06-12 PROCEDURE — 97110 THERAPEUTIC EXERCISES: CPT | Mod: GP | Performed by: PHYSICAL THERAPIST

## 2024-07-06 DIAGNOSIS — I10 ESSENTIAL HYPERTENSION: ICD-10-CM

## 2024-07-06 RX ORDER — AMLODIPINE BESYLATE 5 MG/1
5 TABLET ORAL DAILY
Qty: 90 TABLET | Refills: 1 | Status: SHIPPED | OUTPATIENT
Start: 2024-07-06

## 2024-07-09 ENCOUNTER — PATIENT OUTREACH (OUTPATIENT)
Dept: CARE COORDINATION | Facility: CLINIC | Age: 59
End: 2024-07-09
Payer: COMMERCIAL

## 2024-07-09 ENCOUNTER — VIRTUAL VISIT (OUTPATIENT)
Dept: PSYCHOLOGY | Facility: CLINIC | Age: 59
End: 2024-07-09
Payer: COMMERCIAL

## 2024-07-09 DIAGNOSIS — F10.90 ALCOHOL USE DISORDER: Primary | ICD-10-CM

## 2024-07-09 DIAGNOSIS — F43.23 ADJUSTMENT DISORDER WITH MIXED ANXIETY AND DEPRESSED MOOD: ICD-10-CM

## 2024-07-09 PROCEDURE — 90791 PSYCH DIAGNOSTIC EVALUATION: CPT | Mod: 95 | Performed by: SOCIAL WORKER

## 2024-07-09 ASSESSMENT — PATIENT HEALTH QUESTIONNAIRE - PHQ9
10. IF YOU CHECKED OFF ANY PROBLEMS, HOW DIFFICULT HAVE THESE PROBLEMS MADE IT FOR YOU TO DO YOUR WORK, TAKE CARE OF THINGS AT HOME, OR GET ALONG WITH OTHER PEOPLE: NOT DIFFICULT AT ALL
5. POOR APPETITE OR OVEREATING: NOT AT ALL
SUM OF ALL RESPONSES TO PHQ QUESTIONS 1-9: 1
SUM OF ALL RESPONSES TO PHQ QUESTIONS 1-9: 1

## 2024-07-09 ASSESSMENT — ANXIETY QUESTIONNAIRES
IF YOU CHECKED OFF ANY PROBLEMS ON THIS QUESTIONNAIRE, HOW DIFFICULT HAVE THESE PROBLEMS MADE IT FOR YOU TO DO YOUR WORK, TAKE CARE OF THINGS AT HOME, OR GET ALONG WITH OTHER PEOPLE: SOMEWHAT DIFFICULT
7. FEELING AFRAID AS IF SOMETHING AWFUL MIGHT HAPPEN: NEARLY EVERY DAY
1. FEELING NERVOUS, ANXIOUS, OR ON EDGE: NOT AT ALL
3. WORRYING TOO MUCH ABOUT DIFFERENT THINGS: SEVERAL DAYS
GAD7 TOTAL SCORE: 4
2. NOT BEING ABLE TO STOP OR CONTROL WORRYING: NOT AT ALL
GAD7 TOTAL SCORE: 4
5. BEING SO RESTLESS THAT IT IS HARD TO SIT STILL: NOT AT ALL
6. BECOMING EASILY ANNOYED OR IRRITABLE: NOT AT ALL

## 2024-07-09 NOTE — PROGRESS NOTES
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 effects of drugs and alcohol on health and well-being and Consider evaluation for comprehensive CD evaluation. Referral Placed. Provider gave patient printed information about the effects of chemical use on their health and well being. Recommendations:  It is being recommended that patient complete a chemical health evaluation/assessment, to determine the most appropriate level of care to address the reported substance use concerns.     8. Records:   These were reviewed at time of assessment.   Information in this assessment was obtained from the medical record and  provided by patient who is a good historian.  Patient will have open access to their mental health medical record.    9.   Interactive Complexity: No    10. Safety Plan: There was not an assessed need for a safety plan at the time of this assessment, based upon the information reported at the time of this assessment.     Provider Name/ Credentials:  MAYANK Segovia, [facility] July 9, 2024

## 2024-07-19 DIAGNOSIS — I10 ESSENTIAL HYPERTENSION WITH GOAL BLOOD PRESSURE LESS THAN 130/80: ICD-10-CM

## 2024-07-19 RX ORDER — LISINOPRIL 40 MG/1
40 TABLET ORAL DAILY
Qty: 90 TABLET | Refills: 2 | Status: SHIPPED | OUTPATIENT
Start: 2024-07-19

## 2024-07-23 ENCOUNTER — PATIENT OUTREACH (OUTPATIENT)
Dept: CARE COORDINATION | Facility: CLINIC | Age: 59
End: 2024-07-23
Payer: COMMERCIAL

## 2024-07-26 ENCOUNTER — PATIENT OUTREACH (OUTPATIENT)
Dept: CARE COORDINATION | Facility: CLINIC | Age: 59
End: 2024-07-26
Payer: COMMERCIAL

## 2024-08-01 ENCOUNTER — TRANSFERRED RECORDS (OUTPATIENT)
Dept: HEALTH INFORMATION MANAGEMENT | Facility: CLINIC | Age: 59
End: 2024-08-01
Payer: COMMERCIAL

## 2024-08-01 LAB — RETINOPATHY: NEGATIVE

## 2024-08-06 NOTE — PROGRESS NOTES
DISCHARGE  Reason for Discharge: Patient chooses to discontinue therapy.    Equipment Issued: NA    Discharge Plan: Patient to continue home program.    Referring Provider:  Mary Kate Duncan     06/12/24 0500   Appointment Info   Signing clinician's name / credentials Melchor Baig PT   Total/Authorized Visits 8   Visits Used 8   Medical Diagnosis M25.512 (ICD-10-CM) - Acute pain of left shoulder   PT Tx Diagnosis Left shoulder pain with mobility and force production deficits   Precautions/Limitations from eval:  Assessment: Patient is a 58 year old female with L shoulder pain complaints. The following significant findings have been identified: Pain, Decreased ROM/flexibility, Decreased strength, Impaired muscle performance, Decreased activity tolerance, and Impaired posture. Her presentation seems consistent with chronic L shoulder pain with suspected rotator cuff pathology. These impairments interfere with their ability to perform self care tasks, work tasks, recreational activities, household chores, and household mobility and ability to sleep as compared to previous level of function.   Other pertinent information PMHx: ETOH, HTN, hyperlipidemia, DMII   Progress Note/Certification   Onset of illness/injury or Date of Surgery 10/01/23   Therapy Frequency 1x/week   Predicted Duration 60 days   Progress Note Due Date 04/24/24   Progress Note Completed Date 03/25/24   GOALS   PT Goals 2;3;4   PT Goal 1   Goal Identifier HEP   Goal Description Patient will demonstrate mastery of HEP as evidenced by reporting adherence 5/7 days per week in order to return to PLOF   Goal Progress 1x/day everyday   Target Date 04/24/24   Date Met 06/12/24   PT Goal 2   Goal Identifier Sleep   Goal Description Patient will report no more than one sleep disturbance due to shoulder pain in order to gain restful sleep.   Goal Progress No reported interruptions of sleep from pain   Target Date 05/24/24   Date Met 06/12/24   PT Goal 3    Goal Identifier ROM   Goal Description Patient will report ability to get dressed and shampoo with L shoulder pain of no more than 1/10 in order to perform ADL's pain-free.   Goal Progress No pain with getting dressed.   Target Date 05/24/24   Date Met 06/12/24   PT Goal 4   Goal Identifier Strength   Goal Description Patient will demonstrate L shoulder flexion/abduction MMT of 4+ with no more than 1/10 pain in order to perform light duties about the home   Target Date 05/24/24   Subjective Report   Subjective Report Evita reports  feeling good.  No pain today.  She can feel that she is lifting her arm higher for functional tasks like shaving her arm pits in the shower. Doing her HEP 1x/day she is out of her normal routine of 2x/day   Objective Measures   Objective Measures Objective Measure 1   Objective Measure 1   Objective Measure Worst pain   Details 2/10 with the exercise.  She limits the intensity of the exercise to keep the pain levels low.   Objective Measure 2   Objective Measure Left shoulder AROM   Details Flexion: 146 degrees AROM   Abd: 125 degrees AROM; With arm in scapation can achieve 150 degrees AROM  IR: To just above belt line    ER: T2 Mod loss reaching behind head; 30 degrees from 90/90   Objective Measure 3   Objective Measure Left shoulder PROM   Therapeutic Procedure/Exercise   Therapeutic Procedures: strength, endurance, ROM, flexibility minutes (14216) 30   Ther Proc 1 Pulleys flexion and abduction: x4 min   Ther Proc 1 - Details Left shoulder AAROM flexion/abd/ER  x10 each   Ther Proc 2 Shoulder flexion wall walks with physioball: verbal review   Ther Proc 2 - Details Shoulder flex/ abd (re-instruction)/ ER isometrics: x5, 5 sec holds   Ther Proc 3 Resisted shoulder ER green band: verbal review   Ther Proc 3 - Details Resisted shoulder IR green band: verbal review   Ther Proc 4 Low rows: verbal review   Ther Proc 4 - Details Sidelying shoulder ER: L x15   Ther Proc 5 Scapular retraction  "with depression patient demonstration   Ther Proc 6 ER stretch in doorway 30\" x 4 L   Ther Proc 7 IR towel stretch 30\" x 3 L   Ther Proc 8 counter top weightshifting: verbal review   Ther Proc 9 Arm chair push ups: not today --> Narrow wall push-up: not today   Ther Proc 10 radial nerve glide: verbal review   Skilled Intervention HEP instructed; patient demonstrates understanding and competency.  Cues to isolate shoulder flexion/abduction planes of motion. Discussed nature of potential symptoms and the rotator cuff muscles, Discussed therapy POC, answered pt questions   Patient Response/Progress Patient tolerated well within 1-2/10 pain.   Ther Proc 6 - Details sidelying sleeper stretch 30\" x 3 L   Manual Therapy   Manual Therapy Manual Therapy 2;Manual Therapy 3;Manual Therapy 4   Manual Therapy 1 STM and TPR Bilateral: suboccipitals, cervical paraspinals, upper trap, scalenes, infraspinatus, teres minor/ major   Manual Therapy 1 - Details Not today   Manual Therapy 2 GH mobs grade 3 gentle posterior and inferior glides   Manual Therapy 2 - Details Pt supine   Manual Therapy 3 Cervical longitudinal distration grade 2,3   Manual Therapy 3 - Details not today   Manual Therapy 4 Supine therapist PROM flexion/ abd/ IR/ ER   Skilled Intervention manual therapy to decrease pain and improve mobility   Patient Response/Progress Felt a little looser afterwards,   Education   Learner/Method Patient   Plan   Plan for next session review HEP; assess ROM, progress scapular & rotator cuff strengthening as tolerated, continue manual therapy on shoulder and neck   Comments   Comments Assessement:  Evita returns for PT follow up with continued difficulty with shoulder movements in all directions, with ER being the greatest challenge. She did demonstrate improved shoulder AROM today compared to last visit, but not  ER/ IR. Will progress strengtening as tolerated and continue to work on improving her ROM. Evita reports >80% overall " improvement.  Trial of independent self management started.  Hold chart 30 days.   Total Session Time   Timed Code Treatment Minutes 30   Total Treatment Time (sum of timed and untimed services) 30

## 2024-09-22 ENCOUNTER — HEALTH MAINTENANCE LETTER (OUTPATIENT)
Age: 59
End: 2024-09-22

## 2024-10-07 DIAGNOSIS — I10 ESSENTIAL HYPERTENSION: ICD-10-CM

## 2024-10-07 RX ORDER — AMLODIPINE BESYLATE 5 MG/1
5 TABLET ORAL DAILY
Qty: 90 TABLET | Refills: 1 | OUTPATIENT
Start: 2024-10-07

## 2024-10-12 ENCOUNTER — IMMUNIZATION (OUTPATIENT)
Dept: FAMILY MEDICINE | Facility: CLINIC | Age: 59
End: 2024-10-12
Payer: COMMERCIAL

## 2024-10-12 DIAGNOSIS — Z23 ENCOUNTER FOR IMMUNIZATION: Primary | ICD-10-CM

## 2024-10-12 PROCEDURE — 91320 SARSCV2 VAC 30MCG TRS-SUC IM: CPT

## 2024-10-12 PROCEDURE — 90471 IMMUNIZATION ADMIN: CPT

## 2024-10-12 PROCEDURE — 90673 RIV3 VACCINE NO PRESERV IM: CPT

## 2024-10-12 PROCEDURE — 90480 ADMN SARSCOV2 VAC 1/ONLY CMP: CPT

## 2024-10-12 PROCEDURE — 99207 PR NO CHARGE NURSE ONLY: CPT

## 2024-10-12 NOTE — PROGRESS NOTES
Prior to immunization administration, verified patients identity using patient s name and date of birth. Please see Immunization Activity for additional information.     Is the patient's temperature normal (100.5 or less)? Yes     Patient MEETS CRITERIA. PROCEED with vaccine administration.      Patient instructed to remain in clinic for 15 minutes afterwards, and to report any adverse reactions.      Link to Ancillary Visit Immunization Standing Orders SmartSet     Screening performed by Chely Chambers MA on 10/12/2024 at 10:27 AM.

## 2024-11-25 SDOH — HEALTH STABILITY: PHYSICAL HEALTH: ON AVERAGE, HOW MANY DAYS PER WEEK DO YOU ENGAGE IN MODERATE TO STRENUOUS EXERCISE (LIKE A BRISK WALK)?: 1 DAY

## 2024-11-25 SDOH — HEALTH STABILITY: PHYSICAL HEALTH: ON AVERAGE, HOW MANY MINUTES DO YOU ENGAGE IN EXERCISE AT THIS LEVEL?: 20 MIN

## 2024-11-25 ASSESSMENT — SOCIAL DETERMINANTS OF HEALTH (SDOH): HOW OFTEN DO YOU GET TOGETHER WITH FRIENDS OR RELATIVES?: PATIENT DECLINED

## 2024-11-26 ENCOUNTER — OFFICE VISIT (OUTPATIENT)
Dept: FAMILY MEDICINE | Facility: CLINIC | Age: 59
End: 2024-11-26
Payer: COMMERCIAL

## 2024-11-26 VITALS
OXYGEN SATURATION: 98 % | SYSTOLIC BLOOD PRESSURE: 136 MMHG | HEART RATE: 71 BPM | HEIGHT: 67 IN | TEMPERATURE: 97.8 F | WEIGHT: 206 LBS | RESPIRATION RATE: 16 BRPM | BODY MASS INDEX: 32.33 KG/M2 | DIASTOLIC BLOOD PRESSURE: 72 MMHG

## 2024-11-26 DIAGNOSIS — N28.1 COMPLEX RENAL CYST: ICD-10-CM

## 2024-11-26 DIAGNOSIS — I10 ESSENTIAL HYPERTENSION: ICD-10-CM

## 2024-11-26 DIAGNOSIS — E66.01 MORBID OBESITY DUE TO EXCESS CALORIES (H): ICD-10-CM

## 2024-11-26 DIAGNOSIS — F10.90 ALCOHOL USE DISORDER: ICD-10-CM

## 2024-11-26 DIAGNOSIS — E11.29 TYPE 2 DIABETES MELLITUS WITH MICROALBUMINURIA, WITHOUT LONG-TERM CURRENT USE OF INSULIN (H): ICD-10-CM

## 2024-11-26 DIAGNOSIS — R79.89 ELEVATED LFTS: ICD-10-CM

## 2024-11-26 DIAGNOSIS — R80.9 TYPE 2 DIABETES MELLITUS WITH MICROALBUMINURIA, WITHOUT LONG-TERM CURRENT USE OF INSULIN (H): ICD-10-CM

## 2024-11-26 DIAGNOSIS — Z00.00 ROUTINE GENERAL MEDICAL EXAMINATION AT A HEALTH CARE FACILITY: Primary | ICD-10-CM

## 2024-11-26 LAB
ALBUMIN SERPL BCG-MCNC: 4.3 G/DL (ref 3.5–5.2)
ALP SERPL-CCNC: 117 U/L (ref 40–150)
ALT SERPL W P-5'-P-CCNC: 65 U/L (ref 0–50)
ANION GAP SERPL CALCULATED.3IONS-SCNC: 8 MMOL/L (ref 7–15)
AST SERPL W P-5'-P-CCNC: 130 U/L (ref 0–45)
BILIRUB SERPL-MCNC: 0.5 MG/DL
BUN SERPL-MCNC: 11 MG/DL (ref 6–20)
CALCIUM SERPL-MCNC: 9.5 MG/DL (ref 8.8–10.4)
CHLORIDE SERPL-SCNC: 99 MMOL/L (ref 98–107)
CHOLEST SERPL-MCNC: 220 MG/DL
CREAT SERPL-MCNC: 0.64 MG/DL (ref 0.51–0.95)
CREAT UR-MCNC: 102 MG/DL
EGFRCR SERPLBLD CKD-EPI 2021: >90 ML/MIN/1.73M2
EST. AVERAGE GLUCOSE BLD GHB EST-MCNC: 151 MG/DL
FASTING STATUS PATIENT QL REPORTED: YES
FASTING STATUS PATIENT QL REPORTED: YES
GLUCOSE SERPL-MCNC: 137 MG/DL (ref 70–99)
HBA1C MFR BLD: 6.9 % (ref 0–5.6)
HCO3 SERPL-SCNC: 28 MMOL/L (ref 22–29)
HDLC SERPL-MCNC: 75 MG/DL
LDLC SERPL CALC-MCNC: 112 MG/DL
MICROALBUMIN UR-MCNC: 15.8 MG/L
MICROALBUMIN/CREAT UR: 15.49 MG/G CR (ref 0–25)
NONHDLC SERPL-MCNC: 145 MG/DL
POTASSIUM SERPL-SCNC: 4.3 MMOL/L (ref 3.4–5.3)
PROT SERPL-MCNC: 7.6 G/DL (ref 6.4–8.3)
SODIUM SERPL-SCNC: 135 MMOL/L (ref 135–145)
TRIGL SERPL-MCNC: 163 MG/DL

## 2024-11-26 PROCEDURE — 99214 OFFICE O/P EST MOD 30 MIN: CPT | Mod: 25 | Performed by: NURSE PRACTITIONER

## 2024-11-26 PROCEDURE — 90471 IMMUNIZATION ADMIN: CPT | Performed by: NURSE PRACTITIONER

## 2024-11-26 PROCEDURE — 99396 PREV VISIT EST AGE 40-64: CPT | Mod: 25 | Performed by: NURSE PRACTITIONER

## 2024-11-26 PROCEDURE — 82570 ASSAY OF URINE CREATININE: CPT | Performed by: NURSE PRACTITIONER

## 2024-11-26 PROCEDURE — 90677 PCV20 VACCINE IM: CPT | Performed by: NURSE PRACTITIONER

## 2024-11-26 PROCEDURE — 82043 UR ALBUMIN QUANTITATIVE: CPT | Performed by: NURSE PRACTITIONER

## 2024-11-26 PROCEDURE — 83036 HEMOGLOBIN GLYCOSYLATED A1C: CPT | Performed by: NURSE PRACTITIONER

## 2024-11-26 PROCEDURE — 36415 COLL VENOUS BLD VENIPUNCTURE: CPT | Performed by: NURSE PRACTITIONER

## 2024-11-26 PROCEDURE — 80061 LIPID PANEL: CPT | Performed by: NURSE PRACTITIONER

## 2024-11-26 PROCEDURE — 80053 COMPREHEN METABOLIC PANEL: CPT | Performed by: NURSE PRACTITIONER

## 2024-11-26 RX ORDER — LISINOPRIL 40 MG/1
40 TABLET ORAL DAILY
Qty: 90 TABLET | Refills: 3 | Status: SHIPPED | OUTPATIENT
Start: 2024-11-26

## 2024-11-26 RX ORDER — AMLODIPINE BESYLATE 10 MG/1
10 TABLET ORAL DAILY
Qty: 90 TABLET | Refills: 3 | Status: SHIPPED | OUTPATIENT
Start: 2024-11-26

## 2024-11-26 NOTE — PROGRESS NOTES
"Preventive Care Visit  [facility]  Mary Kate Duncan NP, Family Medicine  Nov 26, 2024      Assessment & Plan     Routine general medical examination at a health care facility    Type 2 diabetes mellitus with microalbuminuria, without long-term current use of insulin (H)  Hgb A1c stable at 6.9.  Patient has been hesitant to start any medications.  Would need to avoid any hepatotoxic medications.   - Lipid panel reflex to direct LDL Fasting  - Albumin Random Urine Quantitative with Creat Ratio  - HEMOGLOBIN A1C  - FOOT EXAM    Essential hypertension  Borderline control.  Increase Amlodipine to 10 mg.  I asked patient to let me know if she develops lower extremity swelling.   - amLODIPine (NORVASC) 10 MG tablet  Dispense: 90 tablet; Refill: 3  - lisinopril (ZESTRIL) 40 MG tablet  Dispense: 90 tablet; Refill: 3  - Comprehensive metabolic panel (BMP + Alb, Alk Phos, ALT, AST, Total. Bili, TP)    Morbid obesity due to excess calories (H)    Complex renal cyst  Due for 1 year follow up scan in February.  - MR Renal w/o & w Contrast    Elevated LFTs  This has been chronic and she is following at [facility].  Has not completed a fibroscan yet; orders placed.  Encouraged patient to schedule follow up with [facility].   - US Elastography Only    Alcohol use disorder  Continues to use alcohol.  Encouraged patient to restart the Naltrexone, as this was beneficial.  Discussed that she can use alcohol while using this medication.  She is considering inpatient treatment.  Will schedule an appointment with her counselor.             BMI  Estimated body mass index is 32.51 kg/m  as calculated from the following:    Height as of this encounter: 1.695 m (5' 6.75\").    Weight as of this encounter: 93.4 kg (206 lb).   Weight management plan: Discussed healthy diet and exercise guidelines    Counseling  Appropriate preventive services were addressed with this patient via screening, questionnaire, or discussion as appropriate for " fall prevention, nutrition, physical activity, Tobacco-use cessation, social engagement, weight loss and cognition.  Checklist reviewing preventive services available has been given to the patient.  Reviewed patient's diet, addressing concerns and/or questions.   She is at risk for lack of exercise and has been provided with information to increase physical activity for the benefit of her well-being.   She is at risk for psychosocial distress and has been provided with information to reduce risk.   The patient reports drinking more than 3 alcoholic drinks per day and/or more than 7 drhnks per week. The patient was counseled and given information about possible harmful effects of excessive alcohol intake.        Lori Jama is a 59 year old, presenting for the following:  Physical (Fasting )    Patient on amlodipine and lisinopril for hypertension.  Not checking blood pressures at all.    Follows with gastroenterology due to elevated liver enzymes.  She has possible early cirrhosis.  She continues to drink about 4 glasses of wine per night.  She was prescribed naltrexone by the gastroenterologist, but is not currently taking.  She does recall that alcohol was not as enjoyable when she was taking.  She is due for follow-up with a gastroenterologist.  Has not completed a FibroScan as recommended.    History of type 2 diabetes.  She has not been on medication for this.    Health Care Directive  Patient does not have a Health Care Directive: Discussed advance care planning with patient; information given to patient to review.      11/25/2024   General Health   How would you rate your overall physical health? (!) FAIR   Feel stress (tense, anxious, or unable to sleep) Rather much      (!) STRESS CONCERN      11/25/2024   Nutrition   Three or more servings of calcium each day? Yes   Diet: Diabetic   How many servings of fruit and vegetables per day? (!) 0-1   How many sweetened beverages each day? 0-1             11/25/2024   Exercise   Days per week of moderate/strenous exercise 1 day   Average minutes spent exercising at this level 20 min      (!) EXERCISE CONCERN      11/25/2024   Social Factors   Frequency of gathering with friends or relatives Patient declined   Worry food won't last until get money to buy more No   Food not last or not have enough money for food? No   Do you have housing? (Housing is defined as stable permanent housing and does not include staying ouside in a car, in a tent, in an abandoned building, in an overnight shelter, or couch-surfing.) Yes   Are you worried about losing your housing? No   Lack of transportation? No   Unable to get utilities (heat,electricity)? No            11/25/2024   Fall Risk   Fallen 2 or more times in the past year? No    Trouble with walking or balance? No        Patient-reported          11/25/2024   Dental   Dentist two times every year? Yes            11/25/2024   TB Screening   Were you born outside of the [place]? No              Today's PHQ-2 Score:       2/23/2024     8:33 AM   PHQ-2 ( 1999 [employer])   Q1: Little interest or pleasure in doing things 0    Q2: Feeling down, depressed or hopeless 1    PHQ-2 Score 1   Q1: Little interest or pleasure in doing things Not at all   Q2: Feeling down, depressed or hopeless Several days   PHQ-2 Score 1       Patient-reported         11/25/2024   Substance Use   Alcohol more than 3/day or more than 7/wk Yes   How often do you have a drink containing alcohol 4 or more times a week   How many alcohol drinks on typical day 3 or 4   How often do you have 5+ drinks at one occasion Weekly   Audit 2/3 Score 4   How often not able to stop drinking once started Daily or almost daily   How often failed to do what normally expected Never   How often needed first drink in am after a heavy drinking session Never   How often feeling of guilt or remorse after drinking Daily or almost daily   How often unable to remember what happened the night  "before Weekly   Have you or someone else been injured because of your drinking No   Has anyone been concerned or suggested you cut down on drinking Yes, during the last year   TOTAL SCORE - AUDIT 23   Do you use any other substances recreationally? No        Social History     Tobacco Use    Smoking status: Former     Types: Cigarettes    Smokeless tobacco: Never   Vaping Use    Vaping status: Never Used   Substance Use Topics    Alcohol use: Yes     Comment: Alcoholic Drinks/day: occasional, weekends, with meals. Wine    Drug use: No           1/5/2024   LAST FHS-7 RESULTS   1st degree relative breast or ovarian cancer No   Any relative bilateral breast cancer No   Any male have breast cancer No   Any ONE woman have BOTH breast AND ovarian cancer No   Any woman with breast cancer before 50yrs No   2 or more relatives with breast AND/OR ovarian cancer No   2 or more relatives with breast AND/OR bowel cancer No                   11/25/2024   STI Screening   New sexual partner(s) since last STI/HIV test? No        History of abnormal Pap smear: Status post hysterectomy with removal of cervix and no history of CIN2 or greater or cervical cancer. Health Maintenance and Surgical History updated.       ASCVD Risk   The 10-year ASCVD risk score (Sudhir ORLANDO, et al., 2019) is: 7.5%    Values used to calculate the score:      Age: 59 years      Sex: Female      Is Non- : No      Diabetic: Yes      Tobacco smoker: No      Systolic Blood Pressure: 136 mmHg      Is BP treated: Yes      HDL Cholesterol: 72 mg/dL      Total Cholesterol: 243 mg/dL         Reviewed and updated as needed this visit by Provider   Tobacco  Allergies  Meds  Problems  Med Hx  Surg Hx  Fam Hx                 Objective    Exam  /72   Pulse 71   Temp 97.8  F (36.6  C) (Temporal)   Resp 16   Ht 1.695 m (5' 6.75\")   Wt 93.4 kg (206 lb)   SpO2 98%   BMI 32.51 kg/m     Estimated body mass index is 32.51 kg/m  " "as calculated from the following:    Height as of this encounter: 1.695 m (5' 6.75\").    Weight as of this encounter: 93.4 kg (206 lb).    Physical Exam  GENERAL: alert and no distress  EYES: Eyes grossly normal to inspection, PERRL and conjunctivae and sclerae normal  HENT: ear canals and TM's normal, nose and mouth without ulcers or lesions  NECK: no adenopathy, no asymmetry, masses, or scars  RESP: lungs clear to auscultation - no rales, rhonchi or wheezes  CV: regular rate and rhythm, normal S1 S2, no S3 or S4, no murmur, click or rub, no peripheral edema  ABDOMEN: soft, nontender, no hepatosplenomegaly, no masses and bowel sounds normal  MS: no gross musculoskeletal defects noted, no edema  SKIN: no suspicious lesions or rashes  NEURO: Normal strength and tone, mentation intact and speech normal  PSYCH: mentation appears normal, affect normal/bright        Signed Electronically by: Mary Kate Duncan NP    "

## 2024-11-26 NOTE — PATIENT INSTRUCTIONS
Patient Education   Preventive Care Advice   This is general advice given by our system to help you stay healthy. However, your care team may have specific advice just for you. Please talk to your care team about your preventive care needs.  Nutrition  Eat 5 or more servings of fruits and vegetables each day.  Try wheat bread, brown rice and whole grain pasta (instead of white bread, rice, and pasta).  Get enough calcium and vitamin D. Check the label on foods and aim for 100% of the RDA (recommended daily allowance).  Lifestyle  Exercise at least 150 minutes each week  (30 minutes a day, 5 days a week).  Do muscle strengthening activities 2 days a week. These help control your weight and prevent disease.  No smoking.  Wear sunscreen to prevent skin cancer.  Have a dental exam and cleaning every 6 months.  Yearly exams  See your health care team every year to talk about:  Any changes in your health.  Any medicines your care team has prescribed.  Preventive care, family planning, and ways to prevent chronic diseases.  Shots (vaccines)   HPV shots (up to age 26), if you've never had them before.  Hepatitis B shots (up to age 59), if you've never had them before.  COVID-19 shot: Get this shot when it's due.  Flu shot: Get a flu shot every year.  Tetanus shot: Get a tetanus shot every 10 years.  Pneumococcal, hepatitis A, and RSV shots: Ask your care team if you need these based on your risk.  Shingles shot (for age 50 and up)  General health tests  Diabetes screening:  Starting at age 35, Get screened for diabetes at least every 3 years.  If you are younger than age 35, ask your care team if you should be screened for diabetes.  Cholesterol test: At age 39, start having a cholesterol test every 5 years, or more often if advised.  Bone density scan (DEXA): At age 50, ask your care team if you should have this scan for osteoporosis (brittle bones).  Hepatitis C: Get tested at least once in your life.  STIs (sexually  transmitted infections)  Before age 24: Ask your care team if you should be screened for STIs.  After age 24: Get screened for STIs if you're at risk. You are at risk for STIs (including HIV) if:  You are sexually active with more than one person.  You don't use condoms every time.  You or a partner was diagnosed with a sexually transmitted infection.  If you are at risk for HIV, ask about PrEP medicine to prevent HIV.  Get tested for HIV at least once in your life, whether you are at risk for HIV or not.  Cancer screening tests  Cervical cancer screening: If you have a cervix, begin getting regular cervical cancer screening tests starting at age 21.  Breast cancer scan (mammogram): If you've ever had breasts, begin having regular mammograms starting at age 40. This is a scan to check for breast cancer.  Colon cancer screening: It is important to start screening for colon cancer at age 45.  Have a colonoscopy test every 10 years (or more often if you're at risk) Or, ask your provider about stool tests like a FIT test every year or Cologuard test every 3 years.  To learn more about your testing options, visit:   .  For help making a decision, visit:   https://bit.ly/ky33999.  Prostate cancer screening test: If you have a prostate, ask your care team if a prostate cancer screening test (PSA) at age 55 is right for you.  Lung cancer screening: If you are a current or former smoker ages 50 to 80, ask your care team if ongoing lung cancer screenings are right for you.  For informational purposes only. Not to replace the advice of your health care provider. Copyright   2023 [facility]. All rights reserved. Clinically reviewed by the [facility]. 20x200 [id number] - REV 01/24.  Learning About Stress  What is stress?     Stress is your body's response to a hard situation. Your body can have a physical, emotional, or mental response. Stress is a fact of life for most people, and it  affects everyone differently. What causes stress for you may not be stressful for someone else.  A lot of things can cause stress. You may feel stress when you go on a job interview, take a test, or run a race. This kind of short-term stress is normal and even useful. It can help you if you need to work hard or react quickly. For example, stress can help you finish an important job on time.  Long-term stress is caused by ongoing stressful situations or events. Examples of long-term stress include long-term health problems, ongoing problems at work, or conflicts in your family. Long-term stress can harm your health.  How does stress affect your health?  When you are stressed, your body responds as though you are in danger. It makes hormones that speed up your heart, make you breathe faster, and give you a burst of energy. This is called the fight-or-flight stress response. If the stress is over quickly, your body goes back to normal and no harm is done.  But if stress happens too often or lasts too long, it can have bad effects. Long-term stress can make you more likely to get sick, and it can make symptoms of some diseases worse. If you tense up when you are stressed, you may develop neck, shoulder, or low back pain. Stress is linked to high blood pressure and heart disease.  Stress also harms your emotional health. It can make you dickerson, tense, or depressed. Your relationships may suffer, and you may not do well at work or school.  What can you do to manage stress?  You can try these things to help manage stress:   Do something active. Exercise or activity can help reduce stress. Walking is a great way to get started. Even everyday activities such as housecleaning or yard work can help.  Try yoga or kike chi. These techniques combine exercise and meditation. You may need some training at first to learn them.  Do something you enjoy. For example, listen to music or go to a movie. Practice your hobby or do volunteer  "work.  Meditate. This can help you relax, because you are not worrying about what happened before or what may happen in the future.  Do guided imagery. Imagine yourself in any setting that helps you feel calm. You can use online videos, books, or a teacher to guide you.  Do breathing exercises. For example:  From a standing position, bend forward from the waist with your knees slightly bent. Let your arms dangle close to the floor.  Breathe in slowly and deeply as you return to a standing position. Roll up slowly and lift your head last.  Hold your breath for just a few seconds in the standing position.  Breathe out slowly and bend forward from the waist.  Let your feelings out. Talk, laugh, cry, and express anger when you need to. Talking with supportive friends or family, a counselor, or a rukhsana leader about your feelings is a healthy way to relieve stress. Avoid discussing your feelings with people who make you feel worse.  Write. It may help to write about things that are bothering you. This helps you find out how much stress you feel and what is causing it. When you know this, you can find better ways to cope.  What can you do to prevent stress?  You might try some of these things to help prevent stress:  Manage your time. This helps you find time to do the things you want and need to do.  Get enough sleep. Your body recovers from the stresses of the day while you are sleeping.  Get support. Your family, friends, and community can make a difference in how you experience stress.  Limit your news feed. Avoid or limit time on social media or news that may make you feel stressed.  Do something active. Exercise or activity can help reduce stress. Walking is a great way to get started.  Where can you learn more?  Go to https://www.365looks.net/patiented  Enter N032 in the search box to learn more about \"Learning About Stress.\"  Current as of: October 24, 2023  Content Version: 14.2 2024 [employer]. " "  Care instructions adapted under license by your healthcare professional. If you have questions about a medical condition or this instruction, always ask your healthcare professional. [employer] disclaims any warranty or liability for your use of this information.    9 Ways to Cut Back on Drinking  Maybe you've found yourself drinking more alcohol than you'd prefer. If you want to cut back, here are some ideas to try.    Think before you drink.  Do you really want a drink, or is it just a habit? If you're used to having a drink at a certain time, try doing something else then.     Look for substitutes.  Find some no-alcohol drinks that you enjoy, like flavored seltzer water, tea with honey, or tonic with a slice of lime. Or try alcohol-free beer or \"virgin\" cocktails (without the alcohol).     Drink more water.  Use water to quench your thirst. Drink a glass of water before you have any alcohol. Have another glass along with every drink or between drinks.     Shrink your drink.  For example, have a bottle of beer instead of a pint. Use a smaller glass for wine. Choose drinks with lower alcohol content (ABV%). Or use less liquor and more mixer in cocktails.     Slow down.  It's easy to drink quickly and without thinking about it. Pay attention, and make each drink last longer.     Do the math.  Total up how much you spend on alcohol each month. How much is that a year? If you cut back, what could you do with the money you save?     Take a break.  Choose a day or two each week when you won't drink at all. Notice how you feel on those days, physically and emotionally. How did you sleep? Do you feel better? Over time, add more break days.     Count calories.  Would you like to lose some weight? For some people that's a good motivator for cutting back. Figure out how many calories are in each drink. How many does that add up to in a day? In a week? In a month?     Practice saying no.  Be ready when someone " "offers you a drink. Try: \"Thanks, I've had enough.\" Or \"Thanks, but I'm cutting back.\" Or \"No, thanks. I feel better when I drink less.\"   Current as of: November 15, 2023  Content Version: 14.2 2024 [facility].   Care instructions adapted under license by your healthcare professional. If you have questions about a medical condition or this instruction, always ask your healthcare professional. [employer] disclaims any warranty or liability for your use of this information.     "

## 2025-01-20 DIAGNOSIS — J30.1 ALLERGIC RHINITIS DUE TO POLLEN, UNSPECIFIED SEASONALITY: ICD-10-CM

## 2025-01-20 RX ORDER — FLUTICASONE PROPIONATE 50 MCG
SPRAY, SUSPENSION (ML) NASAL
Qty: 48 G | Refills: 2 | Status: SHIPPED | OUTPATIENT
Start: 2025-01-20

## 2025-02-04 ENCOUNTER — TELEPHONE (OUTPATIENT)
Dept: FAMILY MEDICINE | Facility: CLINIC | Age: 60
End: 2025-02-04
Payer: COMMERCIAL

## 2025-02-04 NOTE — TELEPHONE ENCOUNTER
Lvmtcb- pt was on the wrong scheduled. I had to change her appt from the MA/LPN schedule to the RN schedule. It was at 10 am now its at 1030 am. If pt is ok, then keep it if not please schedule her on the RN schedule for what time works for her.     Elliot Mccain MA

## 2025-02-05 NOTE — TELEPHONE ENCOUNTER
Left a detailed message on scheduling error - appointment will be moved to RN schedule at 10:30 am.

## 2025-02-06 ENCOUNTER — ALLIED HEALTH/NURSE VISIT (OUTPATIENT)
Dept: FAMILY MEDICINE | Facility: CLINIC | Age: 60
End: 2025-02-06
Payer: COMMERCIAL

## 2025-02-06 ENCOUNTER — TRANSFERRED RECORDS (OUTPATIENT)
Dept: HEALTH INFORMATION MANAGEMENT | Facility: CLINIC | Age: 60
End: 2025-02-06

## 2025-02-06 VITALS — DIASTOLIC BLOOD PRESSURE: 76 MMHG | SYSTOLIC BLOOD PRESSURE: 142 MMHG | HEART RATE: 71 BPM

## 2025-02-06 DIAGNOSIS — I10 ESSENTIAL HYPERTENSION: Primary | ICD-10-CM

## 2025-02-06 NOTE — PROGRESS NOTES
Evita Vasquez is a 59 year old patient who comes in today for a Blood Pressure check because of a medication change. At an office visit with Mary Kate Duncan NP on 11/26/24 her Amlodipine was increased to 10 mg daily. She also takes Lisinopril 40 mg daily. She took both medications at 0630 this morning. She has not noticed an increase in leg swelling. She does report some swelling in her legs after being on her feet all day at work but has not noticed a change since increasing amlodipine. Patient did report that she started to feel anxious prior to checking her BP.     Patient is monitoring Blood Pressure at home.  Average readings are 130's/80's.    Vital Signs as repeated by RN:  Vitals:    02/06/25 1004 02/06/25 1006   BP: (!) 143/77 (!) 142/76   BP Location: Right arm Left arm   Patient Position: Sitting Sitting   Cuff Size: Adult Regular Adult Regular   Pulse: 71 71     Patient is taking medication as prescribed  Patient is tolerating medications well.   Current complaints: none  Disposition:  Routed to provider.  Patient would like a return call with provider's recommendations.     Pratibha LI RN

## 2025-04-07 ENCOUNTER — NURSE TRIAGE (OUTPATIENT)
Dept: FAMILY MEDICINE | Facility: CLINIC | Age: 60
End: 2025-04-07
Payer: COMMERCIAL

## 2025-04-07 NOTE — TELEPHONE ENCOUNTER
Outgoing call to patient. Relayed provider message and she stated understanding. States she has not passed any more blood since she spoke with the nurse this morning. Reports she is feeling gassy and bloated. States her symptoms are improved from yesterday and she is continuing to feel better. She declines UC visit today. Denies dizziness or light headedness. Has been on a liquid and soft diet.    Reviewed red flag symptoms and when to seek emergency help. She states understanding and had no further questions. She plan to follow-up with GI on Friday 4/11.

## 2025-04-07 NOTE — TELEPHONE ENCOUNTER
Provider Response to 2nd Level Triage Request    I have reviewed the RN documentation. My recommendation is:  I agree with RN advice to be seen in person today - Urgent care or ED.

## 2025-04-07 NOTE — TELEPHONE ENCOUNTER
Nurse Triage SBAR    Is this a 2nd Level Triage? YES, LICENSED PRACTITIONER REVIEW IS REQUIRED    Situation:   Patient passing blood in their toilet, most of the time with gas, sometimes with stool.    Background: Patient notes she started feeling symptomatic Saturday (4/5). She reports she has had issues with hemorrhoids in the past, not sure if this is what that is or if it is diverticulitis. She has an appointment to see a GI doctor Friday 4/11/25.    Assessment:   Patient reporting symptoms Saturday included 4/10 abdominal pain, nausea, lack of appetite, fatigue, fever of 100.0, bloating, gas, and diarrhea. Patient reports symptoms have since improved but she is still having bloating, gas and some abdominal pain prior to sitting on the toilet to pas gas or stool. Her stools are becoming more firm. She notes most of the time it is only gas and blood in the toilet. She estimated she has sat on the toilet 20 times in the past 24 hours and passed about a teaspoon or less of blood each time.      Protocol Recommended Disposition:   Go to ED Now    Recommendation:   Gave care advice, patient refusing disposition, would like PCP to advise if she can wait for appointment 4/11/25    Routed to provider    Does the patient meet one of the following criteria for ADS visit consideration? 16+ years old, with an [facility] PCP     TIP  Providers, please consider if this condition is appropriate for management at one of our Acute and Diagnostic Services sites.     If patient is a good candidate, please use dotphrase <dot>triageresponse and select Refer to ADS to document.    Reason for Disposition   MODERATE rectal bleeding (small blood clots, passing blood without stool, or toilet water turns red) more than once a day    Additional Information   Negative: Passed out (e.g., fainted, lost consciousness, blacked out and was not responding)   Negative: Shock suspected (e.g., cold/pale/clammy skin, too weak to stand, low BP, rapid  "pulse)   Negative: Vomiting red blood or black (coffee ground) material   Negative: Sounds like a life-threatening emergency to the triager   Negative: Diarrhea is main symptom   Negative: Rectal symptoms   Negative: SEVERE rectal bleeding (large blood clots; constant or on and off bleeding)   Negative: SEVERE dizziness (e.g., unable to stand, requires support to walk, feels like passing out now)    Answer Assessment - Initial Assessment Questions  1. APPEARANCE of BLOOD: \"What color is it?\" \"Is it passed separately, on the surface of the stool, or mixed in with the stool?\"       Bright red, separately on the surface    2. AMOUNT: \"How much blood was passed?\"       This morning: not a lot  Yesterday: maybe a teaspoon or a tablespoon?    3. FREQUENCY: \"How many times has blood been passed with the stools?\"       Not always with stool, sometimes it is just gas and blood, in the past 24 hours she has sat on the toilet 20 times and seen blood teaspoon-tablespoon of blood    4. ONSET: \"When was the blood first seen in the stools?\" (Days or weeks)       Yesterday 4/6/25 (4:30 AM)    5. DIARRHEA: \"Is there also some diarrhea?\" If Yes, ask: \"How many diarrhea stools in the past 24 hours?\"       She is having diarrhea, last 2 episodes did not have blood, stool is getting more solid    6. CONSTIPATION: \"Do you have constipation?\" If Yes, ask: \"How bad is it?\"      No constipation    7. RECURRENT SYMPTOMS: \"Have you had blood in your stools before?\" If Yes, ask: \"When was the last time?\" and \"What happened that time?\"       Has had problem hemorrhoids before, but this time she was also having symptoms    8. BLOOD THINNERS: \"Do you take any blood thinners?\" (e.g., Coumadin/warfarin, Pradaxa/dabigatran, aspirin)      No    9. OTHER SYMPTOMS: \"Do you have any other symptoms?\"  (e.g., abdomen pain, vomiting, dizziness, fever)      Yesterday abdominal pain, nausea, fever. Today lack of appetite, bloating, gas, low abdominal pain " "before going to the bathroom today 3 or 4/10 pain    10. PREGNANCY: \"Is there any chance you are pregnant?\" \"When was your last menstrual period?\"        N/A    Protocols used: Rectal Bleeding-A-OH    "

## 2025-04-11 ENCOUNTER — TRANSFERRED RECORDS (OUTPATIENT)
Dept: MULTI SPECIALTY CLINIC | Facility: CLINIC | Age: 60
End: 2025-04-11
Payer: COMMERCIAL

## 2025-04-11 LAB
ALT SERPL-CCNC: 107 IU/L (ref 0–32)
AST SERPL-CCNC: 166 IU/L (ref 0–40)
CREATININE (EXTERNAL): 0.8 MG/DL (ref 0.57–1)
GFR ESTIMATED (EXTERNAL): 85 ML/MIN/1.73
GLUCOSE (EXTERNAL): 156 MG/DL (ref 70–99)
INR (EXTERNAL): 1.1 (ref 0.9–1.2)
POTASSIUM (EXTERNAL): 4.2 MMOL/L (ref 3.5–5.2)

## 2025-04-12 ENCOUNTER — TRANSFERRED RECORDS (OUTPATIENT)
Dept: ADMINISTRATIVE | Facility: CLINIC | Age: 60
End: 2025-04-12
Payer: COMMERCIAL

## 2025-04-13 NOTE — PROCEDURES
2025        Evita Vasquez   [address] [place] [address]      Evita Vasquez,  :  1965    Your liver tests show more significant elevation of your AST and ALT levels but numbers remain consistent with your alcohol use and the rest of your liver tests are unremarkable.  Your glucose level is elevated.  The rest of your results are within acceptable range.  Given the degree of liver test elevation I will order another check in 1 month and someone should call you to schedule this.  Comp. Metabolic Panel (14) 2025 08:11   Description Result Units Flags Range   BUN 17 mg/dL  6-24   BUN/Creatinine Ratio 21   9-23   Carbon Dioxide, Total 23 mmol/L  20-29   Globulin, Total 2.8 g/dL  1.5-4.5   Bilirubin, Total 0.6 mg/dL  0.0-1.2   AST (SGOT) 166 IU/L H 0-40   ALT (SGPT) 107 IU/L H 0-32   Albumin 4.7 g/dL  3.8-4.9   Alkaline Phosphatase 115 IU/L     Calcium 10.0 mg/dL  8.7-10.2   Chloride 98 mmol/L     Creatinine 0.80 mg/dL  0.57-1.00   eGFR 85 mL/min/1.73  >59   Glucose 156 mg/dL H 70-99   Potassium 4.2 mmol/L  3.5-5.2   Protein, Total 7.5 g/dL  6.0-8.5   Sodium 136 mmol/L  134-144   Comments   Performed At: , [facility]  [address] [place] [address], [place], [place], [id number]  Faith Franco MD, Phone: [phone]   CBC With Differential/Platelet 2025 08:11   Description Result Units Flags Range   Hemoglobin 13.5 g/dL  11.1-15.9   Hematocrit 38.4 %  34.0-46.6   MCV 98 fL H 79-97   MCHC 35.2 g/dL  31.5-35.7   MCH 34.3 pg H 26.6-33.0   RDW 12.9 %  11.7-15.4   Platelets 215 x10E3/uL  150-450   Neutrophils 59 %  Not Estab.   Lymphs 28 %  Not Estab.   Monocytes 9 %  Not Estab.   Eos 4 %  Not Estab.   Basos 0 %  Not Estab.   Neutrophils (Absolute) 4.0 x10E3/uL  1.4-7.0   Lymphs (Absolute) 1.9 x10E3/uL  0.7-3.1   Monocytes(Absolute) 0.6 x10E3/uL  0.1-0.9   Eos (Absolute) 0.3 x10E3/uL  0.0-0.4   Baso (Absolute) 0.0 x10E3/uL  0.0-0.2   Immature Grans (Abs) 0.0 x10E3/uL  0.0-0.1   Immature  Granulocytes 0 %  Not Estab.   RBC 3.94 x10E6/uL  3.77-5.28   WBC 6.9 x10E3/uL  3.4-10.8   Comments   Performed At: , [facility]  [address] [place], [place], [id number]  Faith Franco MD, Phone: [phone]     Prothrombin Time (PT) 04/11/2025 08:11   Description Result Units Flags Range   INR 1.1   0.9-1.2   Prothrombin Time 12.7 sec H 9.1-12.0   Comments   Performed At: , [facility]  [facility] [place], [place], [id number]  Faith Franco MD, Phone: [phone]   INR:                 Reference interval is for non-anticoagulated patients.                                                                      .                 Suggested INR therapeutic range for Vitamin K                 antagonist therapy:                    Standard Dose (moderate intensity                                   therapeutic range):       2.0 - 3.0                    Higher intensity therapeutic range       2.5 - 3.5           Thank you.    Electronically signed by:  Sangeeta ROSEN 04/12/2025 10:42 AM  Document generated by:  Sangeeta ROSEN  04/12/2025  If your provider ordered multiple tests; the results may not become available at the same time.  If multiple test results are received within 14 days of one another, you may receive a duplicate.  cc:  Mary Kate Duncan NP

## 2025-05-27 ENCOUNTER — OFFICE VISIT (OUTPATIENT)
Dept: FAMILY MEDICINE | Facility: CLINIC | Age: 60
End: 2025-05-27
Attending: NURSE PRACTITIONER
Payer: COMMERCIAL

## 2025-05-27 VITALS
OXYGEN SATURATION: 96 % | DIASTOLIC BLOOD PRESSURE: 70 MMHG | SYSTOLIC BLOOD PRESSURE: 120 MMHG | HEART RATE: 64 BPM | TEMPERATURE: 97.7 F | WEIGHT: 204 LBS | RESPIRATION RATE: 14 BRPM | BODY MASS INDEX: 32.19 KG/M2

## 2025-05-27 DIAGNOSIS — R80.9 TYPE 2 DIABETES MELLITUS WITH MICROALBUMINURIA, WITHOUT LONG-TERM CURRENT USE OF INSULIN (H): Primary | ICD-10-CM

## 2025-05-27 DIAGNOSIS — E11.29 TYPE 2 DIABETES MELLITUS WITH MICROALBUMINURIA, WITHOUT LONG-TERM CURRENT USE OF INSULIN (H): Primary | ICD-10-CM

## 2025-05-27 DIAGNOSIS — R01.1 HEART MURMUR: ICD-10-CM

## 2025-05-27 DIAGNOSIS — I10 ESSENTIAL HYPERTENSION: ICD-10-CM

## 2025-05-27 LAB
ANION GAP SERPL CALCULATED.3IONS-SCNC: 12 MMOL/L (ref 7–15)
BUN SERPL-MCNC: 14.9 MG/DL (ref 6–20)
CALCIUM SERPL-MCNC: 9.7 MG/DL (ref 8.8–10.4)
CHLORIDE SERPL-SCNC: 97 MMOL/L (ref 98–107)
CREAT SERPL-MCNC: 0.69 MG/DL (ref 0.51–0.95)
EGFRCR SERPLBLD CKD-EPI 2021: >90 ML/MIN/1.73M2
EST. AVERAGE GLUCOSE BLD GHB EST-MCNC: 157 MG/DL
GLUCOSE SERPL-MCNC: 160 MG/DL (ref 70–99)
HBA1C MFR BLD: 7.1 % (ref 0–5.6)
HCO3 SERPL-SCNC: 27 MMOL/L (ref 22–29)
POTASSIUM SERPL-SCNC: 4.2 MMOL/L (ref 3.4–5.3)
SODIUM SERPL-SCNC: 136 MMOL/L (ref 135–145)

## 2025-05-27 PROCEDURE — 83036 HEMOGLOBIN GLYCOSYLATED A1C: CPT | Performed by: NURSE PRACTITIONER

## 2025-05-27 PROCEDURE — 3078F DIAST BP <80 MM HG: CPT | Performed by: NURSE PRACTITIONER

## 2025-05-27 PROCEDURE — 3074F SYST BP LT 130 MM HG: CPT | Performed by: NURSE PRACTITIONER

## 2025-05-27 PROCEDURE — 3051F HG A1C>EQUAL 7.0%<8.0%: CPT | Performed by: NURSE PRACTITIONER

## 2025-05-27 PROCEDURE — 36415 COLL VENOUS BLD VENIPUNCTURE: CPT | Performed by: NURSE PRACTITIONER

## 2025-05-27 PROCEDURE — 80048 BASIC METABOLIC PNL TOTAL CA: CPT | Performed by: NURSE PRACTITIONER

## 2025-05-27 PROCEDURE — G2211 COMPLEX E/M VISIT ADD ON: HCPCS | Performed by: NURSE PRACTITIONER

## 2025-05-27 PROCEDURE — 99214 OFFICE O/P EST MOD 30 MIN: CPT | Performed by: NURSE PRACTITIONER

## 2025-05-27 NOTE — PROGRESS NOTES
"  Assessment & Plan     Type 2 diabetes mellitus with microalbuminuria, without long-term current use of insulin (H)  Hgb A1c 7.1.  I recommend initiating medication.  Did not tolerate Metformin well in the past.  Start Jardiance 10 mg once daily.  Discussed proper use and possible side effects of medication.  Recheck labs in 3 months.   - empagliflozin (JARDIANCE) 10 MG TABS tablet  Dispense: 90 tablet; Refill: 1  - Hemoglobin A1c  - PRIMARY CARE FOLLOW-UP SCHEDULING    Hypertension  Well controlled.  Continue Chlorthalidone, Lisinopril, and Amlodipine.   - Basic metabolic panel    Heart murmur  History of regurgitation per patient.  No echocardiogram on file.  I recommend getting a baseline.   - Echocardiogram Complete    The longitudinal plan of care for the diagnosis(es)/condition(s) as documented were addressed during this visit. Due to the added complexity in care, I will continue to support Evita in the subsequent management and with ongoing continuity of care.          BMI  Estimated body mass index is 32.19 kg/m  as calculated from the following:    Height as of 4/18/25: 1.695 m (5' 6.75\").    Weight as of this encounter: 92.5 kg (204 lb).       Subjective   Evita is a 59 year old, presenting for the following health issues:  Follow Up and Derm Problem (Raised mole on right upper back )    Patient presents for a diabetic and blood pressure recheck.  She continues on chlorthalidone, lisinopril, and amlodipine for hypertension.  Her blood pressure this morning was in the 120s over 70s.  Denies any dizziness or lightheadedness.    Continues to follow with [facility] for elevated liver enzymes and alcohol use disorder.    Patient not currently on any medication for diabetes.  She was on metformin in the past, but states she \"did not feel right in the head\".  It has been many years since she has been on this.    History of Present Illness       Diabetes:   She presents for follow up of diabetes.    She is not checking " blood glucose.         She has no concerns regarding her diabetes at this time.   She is not experiencing numbness or burning in feet, excessive thirst, blurry vision, weight changes or redness, sores or blisters on feet.           Hyperlipidemia:  She presents for follow up of hyperlipidemia.   She is not taking medication to lower cholesterol. She is not having myalgia or other side effects to statin medications.    Hypertension: She presents for follow up of hypertension.  She does check blood pressure  regularly outside of the clinic. Outpatient blood pressures have not been over 140/90. She follows a low salt diet.     She eats 2-3 servings of fruits and vegetables daily.She consumes 0 sweetened beverage(s) daily.She exercises with enough effort to increase her heart rate 10 to 19 minutes per day.  She exercises with enough effort to increase her heart rate 3 or less days per week.   She is taking medications regularly.              Objective    /70 (BP Location: Right arm, Patient Position: Sitting, Cuff Size: Adult Regular)   Pulse 64   Temp 97.7  F (36.5  C) (Temporal)   Resp 14   Wt 92.5 kg (204 lb)   SpO2 96%   BMI 32.19 kg/m    Body mass index is 32.19 kg/m .  Physical Exam   GENERAL: alert and no distress  RESP: lungs clear to auscultation - no rales, rhonchi or wheezes  CV: regular rate and rhythm, normal S1 S2, no S3 or S4, 3/6 systolic murmur, click or rub, no peripheral edema           Signed Electronically by: Mary Kate Duncan NP

## 2025-05-28 ENCOUNTER — RESULTS FOLLOW-UP (OUTPATIENT)
Dept: FAMILY MEDICINE | Facility: CLINIC | Age: 60
End: 2025-05-28

## 2025-06-17 ENCOUNTER — HOSPITAL ENCOUNTER (OUTPATIENT)
Dept: CARDIOLOGY | Facility: CLINIC | Age: 60
Discharge: HOME | End: 2025-06-17
Attending: NURSE PRACTITIONER
Payer: COMMERCIAL

## 2025-06-17 DIAGNOSIS — R01.1 HEART MURMUR: ICD-10-CM

## 2025-06-17 LAB — LVEF ECHO: NORMAL

## 2025-06-17 PROCEDURE — 93306 TTE W/DOPPLER COMPLETE: CPT

## 2025-06-17 PROCEDURE — 93306 TTE W/DOPPLER COMPLETE: CPT | Mod: 26 | Performed by: INTERNAL MEDICINE

## 2025-07-15 DIAGNOSIS — T75.3XXS MOTION SICKNESS, SEQUELA: Primary | ICD-10-CM

## 2025-07-15 RX ORDER — SCOPOLAMINE 1 MG/3D
1 PATCH, EXTENDED RELEASE TRANSDERMAL
Qty: 4 PATCH | Refills: 0 | Status: SHIPPED | OUTPATIENT
Start: 2025-07-15

## 2025-07-15 NOTE — TELEPHONE ENCOUNTER
Order/Referral Request    Who is requesting: patient    Orders being requested: scolpolamine patch    Reason service is needed/diagnosis: motion sickness when flying    When are orders needed by: ASAP. Has flight next week. Her current order .    Has this been discussed with Provider: Yes    Where to send orders: pharmacy.

## 2025-08-05 DIAGNOSIS — I10 ESSENTIAL HYPERTENSION: ICD-10-CM

## 2025-08-07 RX ORDER — CHLORTHALIDONE 25 MG/1
25 TABLET ORAL DAILY
Qty: 90 TABLET | Refills: 2 | Status: SHIPPED | OUTPATIENT
Start: 2025-08-07

## 2025-08-12 ENCOUNTER — TRANSFERRED RECORDS (OUTPATIENT)
Dept: HEALTH INFORMATION MANAGEMENT | Facility: CLINIC | Age: 60
End: 2025-08-12
Payer: COMMERCIAL